# Patient Record
Sex: FEMALE | Race: BLACK OR AFRICAN AMERICAN | NOT HISPANIC OR LATINO | Employment: FULL TIME | URBAN - METROPOLITAN AREA
[De-identification: names, ages, dates, MRNs, and addresses within clinical notes are randomized per-mention and may not be internally consistent; named-entity substitution may affect disease eponyms.]

---

## 2018-01-19 ENCOUNTER — OFFICE VISIT (OUTPATIENT)
Dept: CARDIOLOGY | Facility: CLINIC | Age: 53
End: 2018-01-19
Payer: COMMERCIAL

## 2018-01-19 VITALS
SYSTOLIC BLOOD PRESSURE: 154 MMHG | HEIGHT: 66 IN | BODY MASS INDEX: 23.49 KG/M2 | WEIGHT: 146.19 LBS | DIASTOLIC BLOOD PRESSURE: 80 MMHG

## 2018-01-19 DIAGNOSIS — R00.1 SINUS BRADYCARDIA: ICD-10-CM

## 2018-01-19 DIAGNOSIS — I10 ESSENTIAL HYPERTENSION: Primary | ICD-10-CM

## 2018-01-19 DIAGNOSIS — E03.8 OTHER SPECIFIED HYPOTHYROIDISM: ICD-10-CM

## 2018-01-19 PROCEDURE — 99204 OFFICE O/P NEW MOD 45 MIN: CPT | Mod: S$GLB,,, | Performed by: INTERNAL MEDICINE

## 2018-01-19 PROCEDURE — 93000 ELECTROCARDIOGRAM COMPLETE: CPT | Mod: S$GLB,,, | Performed by: INTERNAL MEDICINE

## 2018-01-19 PROCEDURE — 99999 PR PBB SHADOW E&M-NEW PATIENT-LVL II: CPT | Mod: PBBFAC,,, | Performed by: INTERNAL MEDICINE

## 2018-01-19 RX ORDER — LEVOTHYROXINE SODIUM 100 UG/1
112 TABLET ORAL DAILY
COMMUNITY
End: 2018-07-06

## 2018-01-19 RX ORDER — LIOTHYRONINE SODIUM 5 UG/1
5 TABLET ORAL DAILY
COMMUNITY
End: 2022-01-28

## 2018-01-19 RX ORDER — AMLODIPINE BESYLATE 5 MG/1
5 TABLET ORAL DAILY
Qty: 90 TABLET | Refills: 3 | Status: SHIPPED | OUTPATIENT
Start: 2018-01-19 | End: 2018-01-29 | Stop reason: SDUPTHER

## 2018-01-19 NOTE — PROGRESS NOTES
Subjective:   Patient ID:  Kayleigh Frazier is a 52 y.o. female who presents for cardiac consult of Hypertension and Establish Care      HPI  The patient came in today for cardiac consult of Hypertension and Establish Care    PCP - Dr. Ari Sims      This is a 52 year old female newly diagnosed HTN, thyroidectomy due to thyroid nodules, hypothyroidism presents for initial CV evaluation of HTN.     Pt has been having headaches last week. BP was elevated at [facility] 184/88, 169/93, 148/75. PT went to hospital, slipped on ice head her head on concrete, no syncope, likely concussion but was wearing a hat/cap - went to [facility].. She was given Tylenol, BP was 176/75 initialy then 173/70s, then 152/85. Did not have CT head due to normal neuro exam. 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     Patient feels well, no specific complaints, no chest pain, no sob, no leg swelling, no PND, no palpitation, no dizziness, no syncope, no CNS symptoms.    Patient has fairly good exercise tolerance.    Patient is compliant with medications.    1/19/18 ECG - Sinus bradycadia, V rate 48    FH - Grandmother - Stroke in 60s, mult strokes in family    Past Medical History:   Diagnosis Date    Essential hypertension 1/19/2018    Heart murmur        History reviewed. No pertinent surgical history.    Social History   Substance Use Topics    Smoking status: Never Smoker    Smokeless tobacco: Not on file    Alcohol use Yes      Comment: occassionally       Family History   Problem Relation Age of Onset    Hypertension Mother        Patient's Medications   New Prescriptions    AMLODIPINE (NORVASC) 5 MG TABLET    Take 1 tablet (5 mg total) by mouth once daily.   Previous Medications    ERGOCALCIFEROL, VITAMIN D2, (VITAMIN D ORAL)    Take 10,000 Units by mouth.    LEVOTHYROXINE (SYNTHROID) 100 MCG TABLET    Take 100 mcg by mouth once daily.    LIOTHYRONINE (CYTOMEL) 5 MCG TAB    Take 25 mcg by mouth once daily.   Modified  "Medications    No medications on file   Discontinued Medications    No medications on file       Review of Systems   Constitutional: Negative.    HENT: Negative.    Eyes: Negative.    Respiratory: Negative.    Cardiovascular: Negative.    Gastrointestinal: Negative.    Genitourinary: Negative.    Musculoskeletal: Negative.    Skin: Negative.    Neurological: Negative.    Endo/Heme/Allergies: Negative.    Psychiatric/Behavioral: Negative.    All 12 systems otherwise negative.      Wt Readings from Last 3 Encounters:   01/19/18 66.3 kg (146 lb 2.6 oz)     Temp Readings from Last 3 Encounters:   No data found for Temp     BP Readings from Last 3 Encounters:   01/19/18 (!) 154/80     Pulse Readings from Last 3 Encounters:   No data found for Pulse       BP (!) 154/80   Ht 5' 6" (1.676 m)   Wt 66.3 kg (146 lb 2.6 oz)   BMI 23.59 kg/m²  /80     Objective:   Physical Exam   Constitutional: She is oriented to person, place, and time. She appears well-developed and well-nourished. No distress.   HENT:   Head: Normocephalic and atraumatic.   Nose: Nose normal.   Mouth/Throat: Oropharynx is clear and moist.   Eyes: Conjunctivae and EOM are normal. No scleral icterus.   Neck: Normal range of motion. Neck supple. No JVD present. No thyromegaly present.   Cardiovascular: Normal rate, regular rhythm, S1 normal and S2 normal.  Exam reveals no gallop, no S3, no S4 and no friction rub.    No murmur heard.  Pulmonary/Chest: Effort normal and breath sounds normal. No stridor. No respiratory distress. She has no wheezes. She has no rales. She exhibits no tenderness.   Abdominal: Soft. Bowel sounds are normal. She exhibits no distension and no mass. There is no tenderness. There is no rebound.   Genitourinary:   Genitourinary Comments: Deferred   Musculoskeletal: Normal range of motion. She exhibits no edema, tenderness or deformity.   Lymphadenopathy:     She has no cervical adenopathy.   Neurological: She is alert and oriented " to person, place, and time. She exhibits normal muscle tone. Coordination normal.   Skin: Skin is warm and dry. No rash noted. She is not diaphoretic. No erythema. No pallor.   Psychiatric: She has a normal mood and affect. Her behavior is normal. Judgment and thought content normal.   Nursing note and vitals reviewed.      No results found for: NA, K, CL, CO2, BUN, CREATININE, GLU, HGBA1C, MG, AST, ALT, ALBUMIN, PROT, BILITOT, WBC, HGB, HCT, MCV, PLT, INR, TSH, CHOL, HDL, LDLCALC, TRIG, BNP  Assessment:      1. Essential hypertension    2. Other specified hypothyroidism    3. Sinus bradycardia        Plan:   1. HTN  - will start Norvasc 5 mg, titrate as needed  - discussed low salt diet  - cont diet/exercise  - check 2D echo to evaluate LV function/hypertrophy/valve disease    2. Bradycardia, sinus  - cont to monitor  - asymptomatic     3. Hypothyroidism  - cont meds per PCP  - follow up T4 and T3    Thank you for allowing me to participate in this patient's care. Please do not hesitate to contact me with any questions or concerns. Consult note has been forwarded to the referral physician.

## 2018-01-24 ENCOUNTER — CLINICAL SUPPORT (OUTPATIENT)
Dept: CARDIOLOGY | Facility: CLINIC | Age: 53
End: 2018-01-24
Attending: INTERNAL MEDICINE
Payer: COMMERCIAL

## 2018-01-24 DIAGNOSIS — I10 ESSENTIAL HYPERTENSION: ICD-10-CM

## 2018-01-24 LAB
DIASTOLIC DYSFUNCTION: NO
ESTIMATED PA SYSTOLIC PRESSURE: 19.48
RETIRED EF AND QEF - SEE NOTES: 60 (ref 55–65)

## 2018-01-24 PROCEDURE — 93306 TTE W/DOPPLER COMPLETE: CPT | Mod: S$GLB,,, | Performed by: INTERNAL MEDICINE

## 2018-01-25 ENCOUNTER — PATIENT MESSAGE (OUTPATIENT)
Dept: CARDIOLOGY | Facility: CLINIC | Age: 53
End: 2018-01-25

## 2018-01-27 ENCOUNTER — PATIENT MESSAGE (OUTPATIENT)
Dept: CARDIOLOGY | Facility: CLINIC | Age: 53
End: 2018-01-27

## 2018-01-29 DIAGNOSIS — I10 ESSENTIAL HYPERTENSION: ICD-10-CM

## 2018-01-29 RX ORDER — AMLODIPINE BESYLATE 2.5 MG/1
2.5 TABLET ORAL DAILY
Qty: 90 TABLET | Refills: 3 | Status: SHIPPED | OUTPATIENT
Start: 2018-01-29 | End: 2020-05-06

## 2018-06-22 DIAGNOSIS — M25.552 PAIN OF BOTH HIP JOINTS: Primary | ICD-10-CM

## 2018-06-22 DIAGNOSIS — M25.551 PAIN OF BOTH HIP JOINTS: Primary | ICD-10-CM

## 2018-06-26 ENCOUNTER — HOSPITAL ENCOUNTER (OUTPATIENT)
Dept: RADIOLOGY | Facility: HOSPITAL | Age: 53
Discharge: HOME | End: 2018-06-26
Attending: PHYSICIAN ASSISTANT
Payer: COMMERCIAL

## 2018-06-26 ENCOUNTER — OFFICE VISIT (OUTPATIENT)
Dept: ORTHOPEDICS | Facility: CLINIC | Age: 53
End: 2018-06-26
Payer: COMMERCIAL

## 2018-06-26 VITALS
WEIGHT: 140 LBS | SYSTOLIC BLOOD PRESSURE: 128 MMHG | BODY MASS INDEX: 22.5 KG/M2 | HEART RATE: 48 BPM | DIASTOLIC BLOOD PRESSURE: 77 MMHG | HEIGHT: 66 IN

## 2018-06-26 DIAGNOSIS — M25.552 PAIN OF BOTH HIP JOINTS: ICD-10-CM

## 2018-06-26 DIAGNOSIS — M25.551 PAIN OF BOTH HIP JOINTS: ICD-10-CM

## 2018-06-26 DIAGNOSIS — M54.30 SCIATIC LEG PAIN: Primary | ICD-10-CM

## 2018-06-26 PROCEDURE — 3008F BODY MASS INDEX DOCD: CPT | Mod: CPTII,S$GLB,, | Performed by: PHYSICIAN ASSISTANT

## 2018-06-26 PROCEDURE — 3074F SYST BP LT 130 MM HG: CPT | Mod: CPTII,S$GLB,, | Performed by: PHYSICIAN ASSISTANT

## 2018-06-26 PROCEDURE — 73521 X-RAY EXAM HIPS BI 2 VIEWS: CPT | Mod: TC,FY,PO

## 2018-06-26 PROCEDURE — 99204 OFFICE O/P NEW MOD 45 MIN: CPT | Mod: S$GLB,,, | Performed by: PHYSICIAN ASSISTANT

## 2018-06-26 PROCEDURE — 99999 PR PBB SHADOW E&M-EST. PATIENT-LVL III: CPT | Mod: PBBFAC,,, | Performed by: PHYSICIAN ASSISTANT

## 2018-06-26 PROCEDURE — 73521 X-RAY EXAM HIPS BI 2 VIEWS: CPT | Mod: 26,,, | Performed by: RADIOLOGY

## 2018-06-26 PROCEDURE — 3078F DIAST BP <80 MM HG: CPT | Mod: CPTII,S$GLB,, | Performed by: PHYSICIAN ASSISTANT

## 2018-06-26 RX ORDER — DIAZEPAM 2 MG/1
2 TABLET ORAL
Qty: 2 TABLET | Refills: 0 | Status: SHIPPED | OUTPATIENT
Start: 2018-06-26 | End: 2018-07-06 | Stop reason: ALTCHOICE

## 2018-06-26 RX ORDER — PREDNISONE 20 MG/1
20 TABLET ORAL 2 TIMES DAILY
Qty: 10 TABLET | Refills: 0 | Status: SHIPPED | OUTPATIENT
Start: 2018-06-26 | End: 2018-07-01

## 2018-06-26 NOTE — PATIENT INSTRUCTIONS

## 2018-06-26 NOTE — PROGRESS NOTES
"CC:  53-year-old female  Date of injury:  January 2018 slipped on the ice    HPI:  Under the above the patient suffered a slip and a ice.  Landing on her hip. The pain is waxes and wanes is at time. She has a history of bursitis and also lumbar radiculopathy.  She has attended a exercise class with good results but now the pain has returned.  Today pain level is a 7 on a 10 scale.    PMH:    Past Medical History:   Diagnosis Date    Essential hypertension 1/19/2018    Heart murmur        PSH:  History reviewed. No pertinent surgical history.    Family Hx:    Family History   Problem Relation Age of Onset    Hypertension Mother        Allergy:  Review of patient's allergies indicates:  No Known Allergies    Medication:    Current Outpatient Prescriptions:     amLODIPine (NORVASC) 2.5 MG tablet, Take 1 tablet (2.5 mg total) by mouth once daily., Disp: 90 tablet, Rfl: 3    ERGOCALCIFEROL, VITAMIN D2, (VITAMIN D ORAL), Take 10,000 Units by mouth every 7 days. , Disp: , Rfl:     levothyroxine (SYNTHROID) 100 MCG tablet, Take 112 mcg by mouth once daily. , Disp: , Rfl:     liothyronine (CYTOMEL) 5 MCG Tab, Take 5 mcg by mouth once daily. , Disp: , Rfl:     Social History:    Social History     Social History    Marital status:      Spouse name: N/A    Number of children: N/A    Years of education: N/A     Occupational History    Not on file.     Social History Main Topics    Smoking status: Never Smoker    Smokeless tobacco: Never Used    Alcohol use Yes      Comment: occassionally    Drug use: No    Sexual activity: Not on file     Other Topics Concern    Not on file     Social History Narrative    No narrative on file       Vitals:   /77 (BP Location: Left arm, Patient Position: Sitting, BP Method: Medium (Automatic))   Pulse (!) 48   Ht 5' 6" (1.676 m)   Wt 63.5 kg (140 lb)   BMI 22.60 kg/m²      ROS:  GENERAL: No fever, chills, fatigability or weight loss.  SKIN: No rashes, itching " or changes in color or texture of skin.  HEAD: No headaches or recent head trauma.  EYES: Visual acuity fine. No photophobia, ocular pain or diplopia.  EARS: Denies ear pain, discharge or vertigo.  NOSE: No loss of smell, no epistaxis or postnasal drip.  MOUTH & THROAT: No hoarseness or change in voice. No excessive gum bleeding.  NODES: Denies swollen glands.  CHEST: Denies MCC[place]AND, cyanosis, wheezing, cough and sputum production.  CARDIOVASCULAR: Denies chest pain, PND, orthopnea or reduced exercise tolerance.  ABDOMEN: Appetite fine. No weight loss. Denies diarrhea, abdominal pain, hematemesis or blood in stool.  URINARY: No flank pain, dysuria or hematuria.  PERIPHERAL VASCULAR: No claudication or cyanosis.  NEUROLOGIC: No history of seizures, paralysis, alteration of gait or coordination.  MUSCULOSKELETAL: See HPI    PE:  APPEARANCE: Well nourished, well developed, in no acute distress.   HEAD: Normocephalic, atraumatic.  NEUROLOGIC: Cranial Nerves: II-XII grossly intact, also see MUSCULOSKELETAL  MUSCULOSKELETAL:  Point tenderness of both SI joints.  Point tenderness on the the trochanter. Able to heel and toe without difficulty.  Neurovascular intact.    Assessment:           Diagnosis:              1.  Sciatica               2.  Hip contusion    Diagnostic Studies  MRI-No  X-Ray-Yes agree with the findings of  The bony pelvis is intact. Both femoral heads are well seated within acetabula.  No significant joint space narrowing identified.  No plain film evidence to suggest AVN of either hip.  Multiple phleboliths noted within the pelvis.  There also some phleboliths seen overlying the left psoas margin  EMG/NCV-No    Plan:                                                 1. PT-no   continue Pilates                                              2.OT-no                                          3.NSAID-prednisone taper for 5 days                                  4. Narcotics-no                                     5.  Wound care-N/A                                 6. Rest-no                                           7. Surgery-no                                         8. KATE Hose-no                                    9. Anticoagulation therapy-no               10. Elevation-no                                     11. Crutches-no                                    12. Walker-no             13. Cane no                        14. Referral-no                                     15.Injection-no                            16. Splint   /    Cast   /   Cast Shoe-No              17. RICE            18. Follow up-  as needed

## 2018-06-26 NOTE — LETTER
June 27, 2018      Ari Sims MD  [address]  [address]  [place] [address]           [facility]  [address] [place] [address]  Phone: [phone]  Fax: [phone]          Patient: Kayleigh Frazier   MR Number: [id number]   YOB: 1965   Date of Visit: 6/26/2018       Dear Dr. Ari Sims:    Thank you for referring Kayleigh Frazier to me for evaluation. Attached you will find relevant portions of my assessment and plan of care.    If you have questions, please do not hesitate to call me. I look forward to following Kayleigh Frazier along with you.    Sincerely,    Tommie Rodriguez PA-C    Enclosure  CC:  No Recipients    If you would like to receive this communication electronically, please contact [email or web address] or [phone] to request more information on "[employer] Link access.    For providers and/or their staff who would like to refer a patient to [facility], please contact us through our one-stop-shop provider referral line, [facility] , at [phone].    If you feel you have received this communication in error or would no longer like to receive these types of communications, please e-mail [email or web address]

## 2018-06-28 ENCOUNTER — TELEPHONE (OUTPATIENT)
Dept: RADIOLOGY | Facility: HOSPITAL | Age: 53
End: 2018-06-28

## 2018-06-30 ENCOUNTER — HOSPITAL ENCOUNTER (OUTPATIENT)
Dept: RADIOLOGY | Facility: HOSPITAL | Age: 53
Discharge: HOME | End: 2018-06-30
Attending: PHYSICIAN ASSISTANT
Payer: COMMERCIAL

## 2018-06-30 DIAGNOSIS — M54.30 SCIATIC LEG PAIN: ICD-10-CM

## 2018-06-30 PROCEDURE — 72148 MRI LUMBAR SPINE W/O DYE: CPT | Mod: 26,,, | Performed by: RADIOLOGY

## 2018-06-30 PROCEDURE — 72148 MRI LUMBAR SPINE W/O DYE: CPT | Mod: TC,PO

## 2018-07-02 ENCOUNTER — PATIENT MESSAGE (OUTPATIENT)
Dept: ORTHOPEDICS | Facility: CLINIC | Age: 53
End: 2018-07-02

## 2018-07-05 ENCOUNTER — TELEPHONE (OUTPATIENT)
Dept: ORTHOPEDICS | Facility: CLINIC | Age: 53
End: 2018-07-05

## 2018-07-05 NOTE — TELEPHONE ENCOUNTER
Discuss MRI with patient. Back currently better with exercise. She request copy of MRI be sent to Dr. Mccarty, Urology at [facility].

## 2018-07-06 ENCOUNTER — OFFICE VISIT (OUTPATIENT)
Dept: CARDIOLOGY | Facility: CLINIC | Age: 53
End: 2018-07-06
Payer: COMMERCIAL

## 2018-07-06 VITALS
BODY MASS INDEX: 22.71 KG/M2 | WEIGHT: 141.31 LBS | DIASTOLIC BLOOD PRESSURE: 76 MMHG | SYSTOLIC BLOOD PRESSURE: 126 MMHG | HEART RATE: 56 BPM | HEIGHT: 66 IN

## 2018-07-06 DIAGNOSIS — I10 ESSENTIAL HYPERTENSION: Primary | ICD-10-CM

## 2018-07-06 DIAGNOSIS — R00.1 SINUS BRADYCARDIA: ICD-10-CM

## 2018-07-06 DIAGNOSIS — E03.8 OTHER SPECIFIED HYPOTHYROIDISM: ICD-10-CM

## 2018-07-06 PROCEDURE — 99214 OFFICE O/P EST MOD 30 MIN: CPT | Mod: S$GLB,,, | Performed by: INTERNAL MEDICINE

## 2018-07-06 PROCEDURE — 3078F DIAST BP <80 MM HG: CPT | Mod: CPTII,S$GLB,, | Performed by: INTERNAL MEDICINE

## 2018-07-06 PROCEDURE — 99999 PR PBB SHADOW E&M-EST. PATIENT-LVL III: CPT | Mod: PBBFAC,,, | Performed by: INTERNAL MEDICINE

## 2018-07-06 PROCEDURE — 3074F SYST BP LT 130 MM HG: CPT | Mod: CPTII,S$GLB,, | Performed by: INTERNAL MEDICINE

## 2018-07-06 PROCEDURE — 3008F BODY MASS INDEX DOCD: CPT | Mod: CPTII,S$GLB,, | Performed by: INTERNAL MEDICINE

## 2018-07-06 RX ORDER — LEVOTHYROXINE SODIUM 125 UG/1
100 CAPSULE ORAL DAILY
COMMUNITY
End: 2021-01-27 | Stop reason: SDUPTHER

## 2018-07-06 NOTE — PROGRESS NOTES
Subjective:   Patient ID:  Kayleigh Frazier is a 53 y.o. female who presents for cardiac consult of Hypertension (f/u)      HPI  The patient came in today for cardiac consult of Hypertension (f/u)    PCP - Dr. Ari Sims    This is a 53 year old female newly diagnosed HTN, thyroidectomy due to thyroid nodules, hypothyroidism presents for initial CV evaluation of HTN.     1/19/18  Pt has been having headaches last week. BP was elevated at [facility] 184/88, 169/93, 148/75. PT went to hospital, slipped on ice head her head on concrete, no syncope, likely concussion but was wearing a hat/cap - went to [facility].. She was given Tylenol, BP was 176/75 initialy then 173/70s, then 152/85. Did not have CT head due to normal neuro exam. 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     7/6/18  Pt had 2D ECHO in Jan 2018 which revealed normal BiV function and normal PA pressure 19 mmHg. BP is well controlled. Pt had MRI in Jan, pt has significant allergies and is due to see allergy specialist. PT does have occ headaches which improve with allergy meds.     Patient feels no chest pain, no sob, no leg swelling, no PND, no palpitation, no dizziness, no syncope, no CNS symptoms.    Patient has fairly good exercise tolerance.    Patient is compliant with medications.    1/19/18 ECG - Sinus bradycadia, V rate 48    FH - Grandmother - Stroke in 60s, mult strokes in family    2D ECHO  CONCLUSIONS     1 - No wall motion abnormalities.     2 - Normal left ventricular systolic function (EF 60-65%).     3 - Normal left ventricular diastolic function.     4 - Normal right ventricular systolic function .     5 - The estimated PA systolic pressure is 19 mmHg.     This document has been electronically    SIGNED BY: Beatris Salinas MD On: 01/24/2018 17:35    Past Medical History:   Diagnosis Date    Essential hypertension 1/19/2018    Heart murmur        History reviewed. No pertinent surgical history.    Social History   Substance Use  "Topics    Smoking status: Never Smoker    Smokeless tobacco: Never Used    Alcohol use Yes      Comment: occassionally       Family History   Problem Relation Age of Onset    Hypertension Mother        Patient's Medications   New Prescriptions    No medications on file   Previous Medications    AMLODIPINE (NORVASC) 2.5 MG TABLET    Take 1 tablet (2.5 mg total) by mouth once daily.    ERGOCALCIFEROL, VITAMIN D2, (VITAMIN D ORAL)    Take 10,000 Units by mouth every 7 days.     LEVOTHYROXINE (SYNTHROID) 112 MCG TABLET    Take 112 mcg by mouth once daily.    LIOTHYRONINE (CYTOMEL) 5 MCG TAB    Take 5 mcg by mouth once daily.    Modified Medications    No medications on file   Discontinued Medications    DIAZEPAM (VALIUM) 2 MG TABLET    Take 1 tablet (2 mg total) by mouth as needed for Anxiety.    LEVOTHYROXINE (SYNTHROID) 100 MCG TABLET    Take 112 mcg by mouth once daily.        Review of Systems   Constitutional: Negative.    HENT: Negative.    Eyes: Negative.    Respiratory: Negative.    Cardiovascular: Negative.    Gastrointestinal: Negative.    Genitourinary: Negative.    Musculoskeletal: Negative.    Skin: Negative.    Neurological: Negative.    Endo/Heme/Allergies: Negative.    Psychiatric/Behavioral: Negative.    All 12 systems otherwise negative.      Wt Readings from Last 3 Encounters:   07/06/18 64.1 kg (141 lb 5 oz)   06/26/18 63.5 kg (140 lb)   01/19/18 66.3 kg (146 lb 2.6 oz)     Temp Readings from Last 3 Encounters:   No data found for Temp     BP Readings from Last 3 Encounters:   07/06/18 126/76   06/26/18 128/77   01/19/18 (!) 154/80     Pulse Readings from Last 3 Encounters:   07/06/18 (!) 56   06/26/18 (!) 48       /76 (BP Method: Small (Manual))   Pulse (!) 56   Ht 5' 6" (1.676 m)   Wt 64.1 kg (141 lb 5 oz)   BMI 22.81 kg/m²  /80     Objective:   Physical Exam   Constitutional: She is oriented to person, place, and time. She appears well-developed and well-nourished. No distress. "   HENT:   Head: Normocephalic and atraumatic.   Nose: Nose normal.   Mouth/Throat: Oropharynx is clear and moist.   Eyes: Conjunctivae and EOM are normal. No scleral icterus.   Neck: Normal range of motion. Neck supple. No JVD present. No thyromegaly present.   Cardiovascular: Normal rate, regular rhythm, S1 normal and S2 normal.  Exam reveals no gallop, no S3, no S4 and no friction rub.    No murmur heard.  Pulmonary/Chest: Effort normal and breath sounds normal. No stridor. No respiratory distress. She has no wheezes. She has no rales. She exhibits no tenderness.   Abdominal: Soft. Bowel sounds are normal. She exhibits no distension and no mass. There is no tenderness. There is no rebound.   Genitourinary:   Genitourinary Comments: Deferred   Musculoskeletal: Normal range of motion. She exhibits no edema, tenderness or deformity.   Lymphadenopathy:     She has no cervical adenopathy.   Neurological: She is alert and oriented to person, place, and time. She exhibits normal muscle tone. Coordination normal.   Skin: Skin is warm and dry. No rash noted. She is not diaphoretic. No erythema. No pallor.   Psychiatric: She has a normal mood and affect. Her behavior is normal. Judgment and thought content normal.   Nursing note and vitals reviewed.      No results found for: NA, K, CL, CO2, BUN, CREATININE, GLU, HGBA1C, MG, AST, ALT, ALBUMIN, PROT, BILITOT, WBC, HGB, HCT, MCV, PLT, INR, TSH, CHOL, HDL, LDLCALC, TRIG, BNP  Assessment:      1. Essential hypertension    2. Sinus bradycardia    3. Other specified hypothyroidism        Plan:   1. HTN  - well controlled, cont norvasc 2.5  Mg  - if BP remains controlled off meds can stop  - discussed low salt diet  - cont diet/exercise    2. Bradycardia, sinus  - cont to monitor  - asymptomatic     3. Hypothyroidism  - cont meds per PCP    Thank you for allowing me to participate in this patient's care. Please do not hesitate to contact me with any questions or concerns. Consult  note has been forwarded to the referral physician.

## 2018-10-15 ENCOUNTER — TELEPHONE (OUTPATIENT)
Dept: ORTHOPEDICS | Facility: CLINIC | Age: 53
End: 2018-10-15

## 2018-10-15 NOTE — TELEPHONE ENCOUNTER
Left a message for the patient to give the office a call back.FP      ----- Message from Lilly Morgan sent at 10/15/2018  8:24 AM CDT -----  Contact: self [phone]  States that she would like to speak to nurse regarding referral to physical medicine. Please call back at [phone]//thank you acc

## 2019-03-14 DIAGNOSIS — I10 ESSENTIAL HYPERTENSION: Primary | ICD-10-CM

## 2019-04-25 ENCOUNTER — TELEPHONE (OUTPATIENT)
Dept: PHYSICAL MEDICINE AND REHAB | Facility: CLINIC | Age: 54
End: 2019-04-25

## 2019-04-25 NOTE — TELEPHONE ENCOUNTER
----- Message from Alfredito Guerra sent at 4/25/2019 11:42 AM CDT -----  Contact: Pt  Type:  Same Day Appointment Request    Caller is requesting a same day appointment.  Caller declined first available appointment listed below.    Name of Caller: Pt  When is the first available appointment?04/29/19 @ 12:40p.m.   Symptoms: Eval for lower back pain MRI review  Best Call Back Number: [phone] (home)  Additional Information: n/a

## 2019-04-29 ENCOUNTER — OFFICE VISIT (OUTPATIENT)
Dept: PHYSICAL MEDICINE AND REHAB | Facility: CLINIC | Age: 54
End: 2019-04-29
Payer: COMMERCIAL

## 2019-04-29 VITALS
HEART RATE: 60 BPM | HEIGHT: 66 IN | BODY MASS INDEX: 22.66 KG/M2 | WEIGHT: 141 LBS | SYSTOLIC BLOOD PRESSURE: 163 MMHG | DIASTOLIC BLOOD PRESSURE: 83 MMHG | RESPIRATION RATE: 14 BRPM

## 2019-04-29 DIAGNOSIS — M47.816 LUMBAR FACET ARTHROPATHY: ICD-10-CM

## 2019-04-29 DIAGNOSIS — M70.61 GREATER TROCHANTERIC BURSITIS OF BOTH HIPS: Primary | ICD-10-CM

## 2019-04-29 DIAGNOSIS — M70.62 GREATER TROCHANTERIC BURSITIS OF BOTH HIPS: Primary | ICD-10-CM

## 2019-04-29 DIAGNOSIS — M76.32 IT BAND SYNDROME, LEFT: ICD-10-CM

## 2019-04-29 PROCEDURE — 99999 PR PBB SHADOW E&M-EST. PATIENT-LVL III: CPT | Mod: PBBFAC,,, | Performed by: PHYSICAL MEDICINE & REHABILITATION

## 2019-04-29 PROCEDURE — 3008F BODY MASS INDEX DOCD: CPT | Mod: CPTII,S$GLB,, | Performed by: PHYSICAL MEDICINE & REHABILITATION

## 2019-04-29 PROCEDURE — 99204 PR OFFICE/OUTPT VISIT, NEW, LEVL IV, 45-59 MIN: ICD-10-PCS | Mod: S$GLB,,, | Performed by: PHYSICAL MEDICINE & REHABILITATION

## 2019-04-29 PROCEDURE — 3008F PR BODY MASS INDEX (BMI) DOCUMENTED: ICD-10-PCS | Mod: CPTII,S$GLB,, | Performed by: PHYSICAL MEDICINE & REHABILITATION

## 2019-04-29 PROCEDURE — 3077F PR MOST RECENT SYSTOLIC BLOOD PRESSURE >= 140 MM HG: ICD-10-PCS | Mod: CPTII,S$GLB,, | Performed by: PHYSICAL MEDICINE & REHABILITATION

## 2019-04-29 PROCEDURE — 3079F PR MOST RECENT DIASTOLIC BLOOD PRESSURE 80-89 MM HG: ICD-10-PCS | Mod: CPTII,S$GLB,, | Performed by: PHYSICAL MEDICINE & REHABILITATION

## 2019-04-29 PROCEDURE — 99204 OFFICE O/P NEW MOD 45 MIN: CPT | Mod: S$GLB,,, | Performed by: PHYSICAL MEDICINE & REHABILITATION

## 2019-04-29 PROCEDURE — 99999 PR PBB SHADOW E&M-EST. PATIENT-LVL III: ICD-10-PCS | Mod: PBBFAC,,, | Performed by: PHYSICAL MEDICINE & REHABILITATION

## 2019-04-29 PROCEDURE — 3077F SYST BP >= 140 MM HG: CPT | Mod: CPTII,S$GLB,, | Performed by: PHYSICAL MEDICINE & REHABILITATION

## 2019-04-29 PROCEDURE — 3079F DIAST BP 80-89 MM HG: CPT | Mod: CPTII,S$GLB,, | Performed by: PHYSICAL MEDICINE & REHABILITATION

## 2019-04-29 RX ORDER — KETOROLAC TROMETHAMINE 10 MG/1
10 TABLET, FILM COATED ORAL 2 TIMES DAILY
Qty: 8 TABLET | Refills: 0 | Status: SHIPPED | OUTPATIENT
Start: 2019-04-29 | End: 2019-05-03

## 2019-04-29 NOTE — PROGRESS NOTES
PM&R NEW PATIENT HISTORY & PHYSICAL :    Referring Physician:     Chief Complaint   Patient presents with    Hip Pain     left to the upper leg       HPI: This is a 53 y.o.  female being seen in clinic today for evaluation of chronic left hip achy pain that is worse when lying on her left side or with prolonged standing.  She does pilates for exercise, but is still having discomfort and limited ROM.  She has been using heat for short term relief.     History obtained from patient    Functional History:  Walking: Not limited  Transfers: Independent  Assistive devices: No  Power mobility: No  Falls: None     Needs help with:  Nothing - all ADLS normal    Cooking   Cleaning  Bathing   Dressing   Toileting     Past family, medical, social, and surgical history reviewed in chart    Review of Systems:     General- denies lethargy, weight change, fever, chills  Head/neck- denies swallowing difficulties  ENT- denies hearing changes  Cardiovascular-denies chest pain  Pulmonary- denies shortness of breath  GI- denies constipation or bowel incontinence  - denies bladder incontinence  Skin- denies wounds or rashes  Musculoskeletal- denies weakness, +pain  Neurologic- denies numbness and tingling  Psychiatric- denies depressive or psychotic features, denies anxiety  Lymphatic-denies swelling  Endocrine- denies hypoglycemic symptoms/DM history  All other pertinent systems negative     Physical Examination:  General: Well developed, well nourished female, NAD  HEENT:NCAT EOMI bilaterally   Pulmonary:Normal respirations    Spinal Examination: CERVICAL  Active ROM is within normal limits.  Inspection: No deformity of spinal alignment.    Spinal Examination: LUMBAR or THORACIC  Active ROM is within normal limits.  Inspection: No deformity of spinal alignment.  No palpable olisthesis.  Palpation: No vertebral tenderness to percussion.  Mild ttp at si joints, very ttp at left GT bursa, ttp at right GT bursa, mild ttp and tight along  ITB on left  +facet loading bilaterally  SLR Test (seated ):negative  bilaterally  Able to stand on heels and toes    Bilateral Upper and Lower Extremities:  Pulses are 2+ at radial,  bilaterally.  Shoulder/Elbow/Wrist/Hand ROM   Hip/Knee/Ankle ROM wnl  Bilateral Extremities show normal capillary refill.  No signs of cyanosis, rubor, edema, skin changes, or dysvascular changes of appendages.  Nails appear intact.    Neurological Exam:  Cranial Nerves:  II-XII grossly intact    Manual Muscle Testing: (Motor 5=normal)  RIGHT Lower extremity: Hip flexion 5/5, Hip Abduction 4/5, Hip Adduction 4+/5, Knee extension 5/5, Knee flexion 5/5, Ankle dorsiflexion 5/5, Extensor hallucis longus 5/5, Ankle plantarflexion 5/5  LEFT Lower extremity:  Hip flexion 5/5, Hip Abduction 4/5,Hip Adduction 4+/5, Knee extension 5/5, Knee flexion 5/5, Ankle dorsiflexion 5/5, Extensor hallucis longus 5/5, Ankle plantarflexion 5/5    No focal atrophy is noted of either lower extremity.    Bilateral Reflexes:hypo at patellar  No clonus at knee or ankle.    Sensation: tested to light touch  - intact in legs    Gait: Narrow base and good arm swing.      IMPRESSION/PLAN: This is a 53 y.o.  female with GT bursitis-left worse than right, IT band tightness on left, probable underlying mild lumbar facet arthropathy    1. Handouts on back and hip exercises, etc provided  2. Ice instead of heat  3. Ketorolac x4 days, tylenol prn, topical agents  4. If not improving, will send to formal PT    Rose Marie Bruno M.D.  Physical Medicine and Rehab

## 2019-04-29 NOTE — PATIENT INSTRUCTIONS
Side Lying Hip Abduction (Strength)    1. Lie down on the floor on your side. Rest your head on your arm. Bend your legs at the knees.  2. Keep your feet together and lift your top leg up so that your knees are . Keep your hips steady.     3. Slowly lower your leg back down.  4. Repeat 10 times, or as instructed.  5. Switch sides if instructed.     Challenge yourself  Put an elastic band or tubing around your thighs. Raise and lower your top leg slowly and steadily.      Date Last Reviewed: 3/29/2016  © 8017-1614 [email or web address]. [address] [place] [address]. All rights reserved. This information is not intended as a substitute for professional medical care. Always follow your healthcare professional's instructions.        Understanding Trochanteric Bursitis    A bursa is a thin, slippery, sac-like film. It contains a small amount of fluid. This structure is found between bones and soft tissues in and around joints. A bursa cushions and protects a joint. It keeps parts of a joint from rubbing against each other. If a bursa becomes inflamed and irritated, it is known as bursitis.  The trochanteric bursa is found on the hip joint. It lies on top of the bump at the top of the thighbone called the greater trochanter. Inflammation of this bursa is called trochanteric bursitis.     How to say it  [id number]   Causes of trochanteric bursitis  Causes may include:  · Overuse of the hip during running or other sports, dance, or work  · Falling on or irritation to the side of the hip  This condition may occur along with other problems, such as osteoarthritis of the hip or knee, or low back problems. In rare cases, it may occur after hip surgery.  Symptoms of trochanteric bursitis  · Pain or aching on the side of the hip. The pain may travel down the leg.  · Swelling, tenderness, or warmth on the side of the hip at the bony bump at the top of the thigh  Treatment for trochanteric  bursitis  These may include:  · Resting the hip. This allows the bursa to heal.  · Prescription or over-the-counter pain medicines. These help reduce inflammation, swelling, and pain.  · Cold packs and heat packs. These help reduce pain and swelling.  · Stretching and strengthening exercises. These improve flexibility and strength around the hip.  · Physical therapy. This includes exercises or other treatments.  · Injections of medicine into the bursa. This may help reduce inflammation and relieve symptoms.  Possible complications  If you dont give your hip time to heal, the problem may not go away, may return, or may get worse. Rest and treat your hip as directed.     When to call your healthcare provider  Call your healthcare provider right away if you have any of these:  · Fever of 100.4°F (38°C) or higher, or as directed  · Redness, swelling, or warmth that gets worse  · Symptoms that dont get better with prescribed medicines, or get worse  · New symptoms   Date Last Reviewed: 3/29/2016  © 9216-2562 [employer] [address] [place] [address]. All rights reserved. This information is not intended as a substitute for professional medical care. Always follow your healthcare professional's instructions.        Back Exercises: Hip Lift    To start, lie on your back with your knees bent and feet flat on the floor. Dont press your neck or lower back to the floor. Breathe deeply. You should feel comfortable and relaxed in this position:  · Tighten your abdomen and buttocks.  · Slowly raise your hips upward. Be careful not to arch your back.  · Hold for 5 seconds. Lower your hips to the floor.  · Repeat 10 times.  For your safety, check with your healthcare provider before starting an exercise program.   Date Last Reviewed: 8/16/2015  © 3506-1218 [employer] [address] [place] [address]. All rights reserved. This information is not intended as a substitute for  professional medical care. Always follow your healthcare professional's instructions.        Back Exercises: Hip Rotator Stretch    To start, lie on your back with your knees bent and feet flat on the floor. Dont press your neck or lower back to the floor. Breathe deeply. You should feel comfortable and relaxed in this position.  · Rest your right ankle on your left knee.  · Place a towel behind your left thigh, and use it to pull the knee toward your chest. Feel the stretch in your buttocks.  · Hold for 30 to 60 seconds. Release.  · Repeat 2 times.  · Switch legs.   · If there is any pain other than stretch in the knee or buttock, stop and contact your healthcare provider.  For your safety, check with your healthcare provider before starting an exercise program.   Date Last Reviewed: 8/16/2015  © 9357-3881 [employer]. [address] [place] [address]. All rights reserved. This information is not intended as a substitute for professional medical care. Always follow your healthcare professional's instructions.        Hip Abduction (Strength)    6. Lie on your right side on the floor with your legs straight.  7. Raise your left leg about 6 to 8 inches. Keep your legs and hips straight. Dont roll back onto your hip. Hold for 5 seconds, then lower your leg.  8. Repeat 10 times, or as instructed.  9. Switch legs and repeat.     Challenge yourself  Put an elastic band or tubing around both ankles. Hold the band to the floor with your bottom ankle. Raise and lower your top leg slowly and steadily.   Date Last Reviewed: 3/10/2016  © 7435-5596 [employer]. [address] [place] [address]. All rights reserved. This information is not intended as a substitute for professional medical care. Always follow your healthcare professional's instructions.        Hip Abduction with External Rotation (Strength)    These instructions are for your right knee. Switch sides for your left   knee.  10. Get down on the floor on your hands and knees.  11. Lift your right leg up and out to the side. Keep the knee bent. Raise the leg as high as is comfortable. Hold for 3 seconds.  12. Slowly lower your leg back to the floor.  13. Repeat 5 times, or as instructed.  Date Last Reviewed: 3/10/2016  © 9123-9226 [employer]. [address] [place] [address]. All rights reserved. This information is not intended as a substitute for professional medical care. Always follow your healthcare professional's instructions.        Hip Adduction (Strength)    These instructions are for your right foot. Switch sides for your left foot.  14. Lie on your right side on the floor. Keep your right leg straight. Bend your left leg and put your left foot flat on the floor behind your right knee.  15. Raise your right leg as high as you comfortably can. Hold for 5 seconds, then lower it back down.  16. Repeat 10 times, or as instructed.  17. Switch legs and repeat.  Date Last Reviewed: 3/10/2016  © 2283-2496 [employer]. [address] [place] [address]. All rights reserved. This information is not intended as a substitute for professional medical care. Always follow your healthcare professional's instructions.        Hip Adductor Stretch (Flexibility)    18. Sit on the floor. Put the soles of your feet together so your knees are pointed outward.  19. Pull your heels in toward your groin, as close as is comfortable.  20. Put your hands on your knees, and gently push them closer to the floor.  21. Hold for 30 to 60 seconds.  22. Relax and repeat 2 times, or as instructed.  23. Repeat this exercise 3 times a day, or as instructed.  Date Last Reviewed: 3/10/2016  © 2566-0514 [employer]. [address] [place] [address]. All rights reserved. This information is not intended as a substitute for professional medical care. Always follow your healthcare professional's  instructions.        Hip Extension (Strength)    24. Lie on your back on the floor with your knees bent and feet flat on the floor. Put your arms at your side, palms flat on the floor.  25. Tighten your core muscles and keep them tight through the whole exercise.  26. Push down on your feet and raise your hips and lift your buttocks off the floor.  27. Dont arch your back.  28. Hold for 5 seconds.  29. Lower your buttocks back down to the floor.  30. Repeat 5 to 10 times, or as instructed.  Date Last Reviewed: 3/10/2016  © 1034-3269 [employer]. [address] [place] [address], [place] [address]. All rights reserved. This information is not intended as a substitute for professional medical care. Always follow your healthcare professional's instructions.        Exercises to Strengthen Your Lower Back  Strong lower back and abdominal muscles work together to support your spine. The exercises below will help strengthen the lower back. It is important that you begin exercising slowly and increase levels gradually.  Always begin any exercise program with stretching. If you feel pain while doing any of these exercises, stop and talk to your doctor about a more specific exercise program that better suits your condition.   Low back stretch  The point of stretching is to make you more flexible and increase your range of motion. Stretch only as much as you are able. Stretch slowly. Do not push your stretch to the limit. If at any point you feel pain while stretching, this is your (temporary) limit.  · Lie on your back with your knees bent and both feet on the ground.  · Slowly raise your left knee to your chest as you flatten your lower back against the floor. Hold for 5 seconds.  · Relax and repeat the exercise with your right knee.  · Do 10 of these exercises for each leg.  · Repeat hugging both knees to your chest at the same time.  Building lower back strength  Start your exercise routine with 10 to 30 minutes a  day, 1 to 3 times a day.  Initial exercises  Lying on your back:  1. Ankle pumps: Move your foot up and down, towards your head, and then away. Repeat 10 times with each foot.  2. Heel slides: Slowly bend your knee, drawing the heel of your foot towards you. Then slide your heel/foot from you, straightening your knee. Do not lift your foot off the floor (this is not a leg lift).  3. Abdominal contraction: Bend your knees and put your hands on your stomach. Tighten your stomach muscles. Hold for 5 seconds, then relax. Repeat 10 times.  4. Straight leg raise: Bend one leg at the knee and keep the other leg straight. Tighten your stomach muscles. Slowly lift your straight leg 6 to 12 inches off the floor and hold for up to 5 seconds. Repeat 10 times on each side.  Standin. Wall squats: Stand with your back against the wall. Move your feet about 12 inches away from the wall. Tighten your stomach muscles, and slowly bend your knees until they are at about a 45 degree angle. Do not go down too far. Hold about 5 seconds. Then slowly return to your starting position. Repeat 10 times.  2. Heel raises: Stand facing the wall. Slowly raise the heels of your feet up and down, while keeping your toes on the floor. If you have trouble balancing, you can touch the wall with your hands. Repeat 10 times.  More advanced exercises  When you feel comfortable enough, try these exercises.  1. Kneeling lumbar extension: Begin on your hands and knees. At the same time, raise and straighten your right arm and left leg until they are parallel to the ground. Hold for 2 seconds and come back slowly to a starting position. Repeat with left arm and right leg, alternating 10 times.  2. Prone lumbar extension: Lie face down, arms extended overhead, palms on the floor. At the same time, raise your right arm and left leg as high as comfortably possible. Hold for 10 seconds and slowly return to start. Repeat with left arm and right leg,  alternating 10 times. Gradually build up to 20 times. (Advanced: Repeat this exercise raising both arms and both legs a few inches off the floor at the same time. Hold for 5 seconds and release.)  3. Pelvic tilt: Lie on the floor on your back with your knees bent at 90 degrees. Your feet should be flat on the floor. Inhale, exhale, then slowly contract your abdominal muscles bringing your navel toward your spine. Let your pelvis rock back until your lower back is flat on the floor. Hold for 10 seconds while breathing smoothly.  4. Abdominal crunch: Perform a pelvic tilt (above) flattening your lower back against the floor. Holding the tension in your abdominal muscles, take another breath and raise your shoulder blades off the ground (this is not a full sit-up). Keep your head in line with your body (dont bend your neck forward). Hold for 2 seconds, then slowly lower.  Date Last Reviewed: 6/1/2016  © 6585-8251 [employer]. [address] [place] [address], [place] [address]. All rights reserved. This information is not intended as a substitute for professional medical care. Always follow your healthcare professional's instructions.        Understanding Iliotibial Band Syndrome  Iliotibial band syndrome, or IT band syndrome, is a condition that causes pain on the outside of the knee. It most often occurs in athletes, especially long-distance runners. It can also happen if you cycle, ski, row, or play soccer. It can also occur in people who are just starting to exercise.  What is the IT band?  The iliotibial (IT) band is a strong, thick band of tissue that runs down the outside of your thigh. It goes all the way from your hipbones to the top of your shinbone (tibia), just below your knee joint. The bones of your knee joint include your tibia, your thighbone (femur), and your kneecap (patella).  When you bend and straighten your leg, the IT band moves over the outer lower edge of your femur. Over time, bending and  straightening your leg can cause the IT band to irritate nearby tissues and cause pain.  What causes IT band syndrome?  Researchers are still learning the exact cause of IT band syndrome. The pain may be caused by the IT band rubbing over the lower outer edge of the femur. This may cause inflammation in the bone, tendons, and small fluid-filled sacs (bursa) in the area. The IT band may also compress the tissue under it and cause pain.  If you are a runner, you might be more likely to develop IT band syndrome if:  · You run on uneven or downhill terrain  · You run on worn-out shoes  · You run many miles per day  · Your legs slope a little inward from your knee to your ankle (bowlegs)  What are the symptoms of IT band syndrome?  IT band syndrome causes pain on the outside of the knee or side of the thigh. It might affect one or both of your knees. The pain is an aching, burning feeling that can spread up the thigh to the hip. You may feel this pain only when you exercise, such as while running. The pain may be worst right after you step on your foot. It may only happen near the end of your exercise. As the condition gets worse, pain may start earlier and continue after you have stopped exercising. Going up and down the stairs may make the pain worse.  How is IT band syndrome diagnosed?  Your doctor will ask about your health history and your symptoms. He or she will give you a physical exam. This will include an exam of your knee. The range of motion and strength of your knee will be tested. The doctor will look for areas of pain around your knee, thigh, and hip. The symptoms of IT band syndrome can be like those of osteoarthritis or a meniscal tear. Your doctor will need to make sure IT band syndrome is the cause of your symptoms. If the diagnosis is not clear, you may need imaging tests. These can include an X-ray or MRI scan.  Date Last Reviewed: 4/1/2017  © 6042-5478 [employer]. [address] [place] [address]  Road, [place] [address]. All rights reserved. This information is not intended as a substitute for professional medical care. Always follow your healthcare professional's instructions.        Treatment for Iliotibial Band Syndrome  Iliotibial band syndrome, or IT band syndrome, is a condition that causes pain on the outside of the knee. It most often occurs in athletes, especially long-distance runners. It can happen if you cycle, ski, row, or play soccer. It can also occur in people who are starting to exercise.  Types of treatment  Treatment may include:  · Avoiding any activity that makes your knee pain worse for a while (like running), and returning to this activity slowly over time  · Icing the outside of your knee when it causes you pain  · Taking over-the-counter pain medicines  · Having corticosteroid injections, to reduce inflammation  · Making changes to your activity, like lowering your bicycle seat for cycling or improving your running form  · Practicing special exercises to stretch and strengthen the muscles around your hip and your knee  You may find it helpful to work with a physical therapist.  These treatments help most people with IT band syndrome. Your doctor may advise surgery if you still have severe symptoms after 6 months of other treatment. Your doctor will talk with you about the types of surgery.  Preventing IT band syndrome  You may be able to prevent IT band syndrome if you:  · Run on even surfaces  · Replace your running shoes often  · Ease up on your training  · On a track, make sure you run in both directions  · Have an expert check your stance for running and other sporting activities  · Stretch your outer thigh and hamstrings often  If you are new to exercise, start slowly. Increase your activity over time.  Talk with your doctor or  for more advice.  When to call the healthcare provider  Call your healthcare provider right away if you have any of these:  · Symptoms that get  worse, or dont get better with treatment  · New symptoms   Date Last Reviewed: 8/10/2015  © 7709-7929 [employer]. [address] [place] [address], [place] [address]. All rights reserved. This information is not intended as a substitute for professional medical care. Always follow your healthcare professional's instructions.

## 2020-05-06 ENCOUNTER — OFFICE VISIT (OUTPATIENT)
Dept: CARDIOLOGY | Facility: CLINIC | Age: 55
End: 2020-05-06
Payer: COMMERCIAL

## 2020-05-06 ENCOUNTER — PATIENT MESSAGE (OUTPATIENT)
Dept: CARDIOLOGY | Facility: CLINIC | Age: 55
End: 2020-05-06

## 2020-05-06 DIAGNOSIS — I10 ESSENTIAL HYPERTENSION: Primary | ICD-10-CM

## 2020-05-06 DIAGNOSIS — E03.8 OTHER SPECIFIED HYPOTHYROIDISM: ICD-10-CM

## 2020-05-06 DIAGNOSIS — R00.2 PALPITATIONS: ICD-10-CM

## 2020-05-06 PROBLEM — R00.1 SINUS BRADYCARDIA: Status: RESOLVED | Noted: 2018-01-19 | Resolved: 2020-05-06

## 2020-05-06 PROCEDURE — 99214 OFFICE O/P EST MOD 30 MIN: CPT | Mod: 95,,, | Performed by: INTERNAL MEDICINE

## 2020-05-06 PROCEDURE — 99214 PR OFFICE/OUTPT VISIT, EST, LEVL IV, 30-39 MIN: ICD-10-PCS | Mod: 95,,, | Performed by: INTERNAL MEDICINE

## 2020-05-06 RX ORDER — CARVEDILOL 3.12 MG/1
3.12 TABLET ORAL 2 TIMES DAILY WITH MEALS
Qty: 60 TABLET | Refills: 2 | Status: SHIPPED | OUTPATIENT
Start: 2020-05-06 | End: 2020-06-22

## 2020-05-06 RX ORDER — CARVEDILOL 6.25 MG/1
6.25 TABLET ORAL 2 TIMES DAILY WITH MEALS
Qty: 60 TABLET | Refills: 2 | Status: SHIPPED | OUTPATIENT
Start: 2020-05-06 | End: 2020-05-06 | Stop reason: SDUPTHER

## 2020-05-06 NOTE — PROGRESS NOTES
Subjective:   Patient ID:  Kayleigh Frazier is a 54 y.o. female who presents for cardiac consult of Follow-up    The patient location is: home  The chief complaint leading to consultation is: CV follow up  Visit type: audiovisual  Total time spent with patient: 21 min  Each patient to whom he or she provides medical services by telemedicine is:  (1) informed of the relationship between the physician and patient and the respective role of any other health care provider with respect to management of the patient; and (2) notified that he or she may decline to receive medical services by telemedicine and may withdraw from such care at any time.    Notes: see below      HPI  The patient came in today for cardiac consult of Follow-up    PCP - Dr. Ari Sims    Kayleigh Frazier is a 54 y.o. female  HTN, thyroidectomy due to thyroid nodules, hypothyroidism presents for follow up CV eval.     1/19/18  Pt has been having headaches last week. BP was elevated at [facility] 184/88, 169/93, 148/75. PT went to hospital, slipped on ice head her head on concrete, no syncope, likely concussion but was wearing a hat/cap - went to [facility].. She was given Tylenol, BP was 176/75 initialy then 173/70s, then 152/85. Did not have CT head due to normal neuro exam. 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     7/6/18  Pt had 2D ECHO in Jan 2018 which revealed normal BiV function and normal PA pressure 19 mmHg. BP is well controlled. Pt had MRI in Jan, pt has significant allergies and is due to see allergy specialist. PT does have occ headaches which improve with allergy meds.     5/6/20  She switched jobs working at [employer]. She helps with special projects. For the past two weeks has been having headcahes, she has been caring for her mom as well. She is unsure if anxiety causing issues. Last night 162/91 - 132/86, she just took a hot bath and came over after talking with her mother.     Patient feels no  chest pain, no sob, no leg swelling, no PND,  no dizziness, no syncope, no CNS symptoms.    Patient has fairly good exercise tolerance.    Patient is compliant with medications.    1/19/18 ECG - Sinus bradycadia, V rate 48    FH - Grandmother - Stroke in 60s, mult strokes in family    2D ECHO  CONCLUSIONS     1 - No wall motion abnormalities.     2 - Normal left ventricular systolic function (EF 60-65%).     3 - Normal left ventricular diastolic function.     4 - Normal right ventricular systolic function .     5 - The estimated PA systolic pressure is 19 mmHg.     This document has been electronically    SIGNED BY: Beatris Salinas MD On: 01/24/2018 17:35    Past Medical History:   Diagnosis Date    Essential hypertension 1/19/2018    Heart murmur        History reviewed. No pertinent surgical history.    Social History     Tobacco Use    Smoking status: Never Smoker    Smokeless tobacco: Never Used   Substance Use Topics    Alcohol use: Yes     Comment: occassionally    Drug use: No       Family History   Problem Relation Age of Onset    Hypertension Mother        Patient's Medications   New Prescriptions    CARVEDILOL (COREG) 3.125 MG TABLET    Take 1 tablet (3.125 mg total) by mouth 2 (two) times daily with meals.   Previous Medications    ERGOCALCIFEROL, VITAMIN D2, (VITAMIN D ORAL)    Take 10,000 Units by mouth every 7 days.     LEVOTHYROXINE (SYNTHROID) 112 MCG TABLET    Take 112 mcg by mouth once daily.    LIOTHYRONINE (CYTOMEL) 5 MCG TAB    Take 5 mcg by mouth once daily.    Modified Medications    No medications on file   Discontinued Medications    AMLODIPINE (NORVASC) 2.5 MG TABLET    Take 1 tablet (2.5 mg total) by mouth once daily.       Review of Systems   Constitutional: Negative.    HENT: Negative.    Eyes: Negative.    Respiratory: Negative.    Cardiovascular: Positive for palpitations.   Gastrointestinal: Negative.    Genitourinary: Negative.    Musculoskeletal: Negative.    Skin: Negative.     Neurological: Negative.    Endo/Heme/Allergies: Negative.    Psychiatric/Behavioral: The patient is nervous/anxious.    All 12 systems otherwise negative.      Wt Readings from Last 3 Encounters:   04/29/19 64 kg (141 lb)   07/06/18 64.1 kg (141 lb 5 oz)   06/26/18 63.5 kg (140 lb)     Temp Readings from Last 3 Encounters:   No data found for Temp     BP Readings from Last 3 Encounters:   04/29/19 (!) 163/83   07/06/18 126/76   06/26/18 128/77     Pulse Readings from Last 3 Encounters:   04/29/19 60   07/06/18 (!) 56   06/26/18 (!) 48       There were no vitals taken for this visit.     Objective:   Physical Exam   Constitutional: She is oriented to person, place, and time. She appears well-developed and well-nourished. No distress.   HENT:   Head: Normocephalic.   Mouth/Throat: No oropharyngeal exudate.   Eyes: Conjunctivae are normal. Right eye exhibits no discharge. Left eye exhibits no discharge. No scleral icterus.   Pulmonary/Chest: Effort normal. No respiratory distress.   Neurological: She is alert and oriented to person, place, and time.   Skin: No rash noted. She is not diaphoretic. No erythema. No pallor.   Psychiatric: She has a normal mood and affect. Her behavior is normal. Judgment and thought content normal.       No results found for: NA, K, CL, CO2, BUN, CREATININE, GLU, HGBA1C, MG, AST, ALT, ALBUMIN, PROT, BILITOT, WBC, HGB, HCT, MCV, PLT, INR, TSH, CHOL, HDL, LDLCALC, TRIG, BNP  Assessment:      1. Essential hypertension    2. Palpitations    3. Other specified hypothyroidism        Plan:   1. HTN with palpitations  - start Coreg  - discussed low salt diet  - cont diet/exercise    2. Bradycardia, sinus - resolved  - cont to monitor  - asymptomatic     3. Hypothyroidism  - cont meds per PCP    Thank you for allowing me to participate in this patient's care. Please do not hesitate to contact me with any questions or concerns. Consult note has been forwarded to the referral physician.

## 2020-05-08 ENCOUNTER — PATIENT MESSAGE (OUTPATIENT)
Dept: CARDIOLOGY | Facility: CLINIC | Age: 55
End: 2020-05-08

## 2020-05-08 NOTE — TELEPHONE ENCOUNTER
I called the patient feels tired. She denies any swelling or chest pain, but she is slightly short of breath. She recorded her blood pressure at 129/72 61 and 124/71 60, but these were not taken when she felt tired. I asked her to take her blood pressure again and it was 129/67 63.  Please advise.

## 2020-06-19 DIAGNOSIS — I10 ESSENTIAL HYPERTENSION: Primary | ICD-10-CM

## 2020-06-22 ENCOUNTER — HOSPITAL ENCOUNTER (OUTPATIENT)
Dept: CARDIOLOGY | Facility: HOSPITAL | Age: 55
Discharge: HOME | End: 2020-06-22
Attending: INTERNAL MEDICINE
Payer: COMMERCIAL

## 2020-06-22 ENCOUNTER — OFFICE VISIT (OUTPATIENT)
Dept: CARDIOLOGY | Facility: CLINIC | Age: 55
End: 2020-06-22
Payer: COMMERCIAL

## 2020-06-22 VITALS
BODY MASS INDEX: 24.75 KG/M2 | BODY MASS INDEX: 24.75 KG/M2 | SYSTOLIC BLOOD PRESSURE: 149 MMHG | DIASTOLIC BLOOD PRESSURE: 70 MMHG | WEIGHT: 154 LBS | HEIGHT: 66 IN | SYSTOLIC BLOOD PRESSURE: 149 MMHG | HEIGHT: 66 IN | WEIGHT: 154 LBS | DIASTOLIC BLOOD PRESSURE: 80 MMHG

## 2020-06-22 VITALS
OXYGEN SATURATION: 98 % | BODY MASS INDEX: 24.75 KG/M2 | DIASTOLIC BLOOD PRESSURE: 68 MMHG | HEART RATE: 53 BPM | WEIGHT: 154 LBS | SYSTOLIC BLOOD PRESSURE: 114 MMHG | HEIGHT: 66 IN

## 2020-06-22 DIAGNOSIS — M79.603 PAIN AND SWELLING OF UPPER EXTREMITY, UNSPECIFIED LATERALITY: ICD-10-CM

## 2020-06-22 DIAGNOSIS — I10 ESSENTIAL HYPERTENSION: ICD-10-CM

## 2020-06-22 DIAGNOSIS — I10 ESSENTIAL HYPERTENSION: Primary | ICD-10-CM

## 2020-06-22 DIAGNOSIS — I49.3 PVC'S (PREMATURE VENTRICULAR CONTRACTIONS): ICD-10-CM

## 2020-06-22 DIAGNOSIS — E03.8 OTHER SPECIFIED HYPOTHYROIDISM: ICD-10-CM

## 2020-06-22 DIAGNOSIS — M25.50 ARTHRALGIA, UNSPECIFIED JOINT: ICD-10-CM

## 2020-06-22 DIAGNOSIS — M79.89 PAIN AND SWELLING OF UPPER EXTREMITY, UNSPECIFIED LATERALITY: ICD-10-CM

## 2020-06-22 DIAGNOSIS — G47.39 OTHER SLEEP APNEA: ICD-10-CM

## 2020-06-22 LAB
LEFT WRIST BRACHIAL INDEX: 1.07 WBI
RIGHT WRIST BRACHIAL INDEX: 1.06 WBI
UPPER ARTERIAL LEFT ARM AXILLARY SYS MAX: 68 CM/S
UPPER ARTERIAL LEFT ARM BRACHIAL SYS MAX: 67 CM/S
UPPER ARTERIAL LEFT ARM RADIAL SYS MAX: 51 CM/S
UPPER ARTERIAL LEFT ARM SUBCLAVIAN SYS MAX: 90 CM/S
UPPER ARTERIAL LEFT ARM ULNAR SYS MAX: 36 CM/S
UPPER ARTERIAL RIGHT ARM AXILLARY SYS MAX: 75 CM/S
UPPER ARTERIAL RIGHT ARM BRACHIAL SYS MAX: 85 CM/S
UPPER ARTERIAL RIGHT ARM RADIAL SYS MAX: 60 CM/S
UPPER ARTERIAL RIGHT ARM SUBCLAVIAN SYS MAX: 84 CM/S
UPPER ARTERIAL RIGHT ARM ULNAR SYS MAX: 57 CM/S

## 2020-06-22 PROCEDURE — 93930 UPPER EXTREMITY STUDY: CPT | Mod: 50

## 2020-06-22 PROCEDURE — 3074F PR MOST RECENT SYSTOLIC BLOOD PRESSURE < 130 MM HG: ICD-10-PCS | Mod: CPTII,S$GLB,, | Performed by: INTERNAL MEDICINE

## 2020-06-22 PROCEDURE — 93970 EXTREMITY STUDY: CPT | Mod: 26,,, | Performed by: INTERNAL MEDICINE

## 2020-06-22 PROCEDURE — 99999 PR PBB SHADOW E&M-EST. PATIENT-LVL IV: ICD-10-PCS | Mod: PBBFAC,,, | Performed by: INTERNAL MEDICINE

## 2020-06-22 PROCEDURE — 3008F BODY MASS INDEX DOCD: CPT | Mod: CPTII,S$GLB,, | Performed by: INTERNAL MEDICINE

## 2020-06-22 PROCEDURE — 93005 ELECTROCARDIOGRAM TRACING: CPT

## 2020-06-22 PROCEDURE — 3008F PR BODY MASS INDEX (BMI) DOCUMENTED: ICD-10-PCS | Mod: CPTII,S$GLB,, | Performed by: INTERNAL MEDICINE

## 2020-06-22 PROCEDURE — 3078F DIAST BP <80 MM HG: CPT | Mod: CPTII,S$GLB,, | Performed by: INTERNAL MEDICINE

## 2020-06-22 PROCEDURE — 93930 UPPER EXTREMITY STUDY: CPT | Mod: 26,,, | Performed by: INTERNAL MEDICINE

## 2020-06-22 PROCEDURE — 3078F PR MOST RECENT DIASTOLIC BLOOD PRESSURE < 80 MM HG: ICD-10-PCS | Mod: CPTII,S$GLB,, | Performed by: INTERNAL MEDICINE

## 2020-06-22 PROCEDURE — 99214 OFFICE O/P EST MOD 30 MIN: CPT | Mod: 25,S$GLB,, | Performed by: INTERNAL MEDICINE

## 2020-06-22 PROCEDURE — 93970 EXTREMITY STUDY: CPT | Mod: 50

## 2020-06-22 PROCEDURE — 93010 ELECTROCARDIOGRAM REPORT: CPT | Mod: ,,, | Performed by: INTERNAL MEDICINE

## 2020-06-22 PROCEDURE — 99999 PR PBB SHADOW E&M-EST. PATIENT-LVL IV: CPT | Mod: PBBFAC,,, | Performed by: INTERNAL MEDICINE

## 2020-06-22 PROCEDURE — 93010 EKG 12-LEAD: ICD-10-PCS | Mod: ,,, | Performed by: INTERNAL MEDICINE

## 2020-06-22 PROCEDURE — 99214 PR OFFICE/OUTPT VISIT, EST, LEVL IV, 30-39 MIN: ICD-10-PCS | Mod: 25,S$GLB,, | Performed by: INTERNAL MEDICINE

## 2020-06-22 PROCEDURE — 3074F SYST BP LT 130 MM HG: CPT | Mod: CPTII,S$GLB,, | Performed by: INTERNAL MEDICINE

## 2020-06-22 PROCEDURE — 93970 CV US DOPPLER VENOUS ARMS BILATERAL (CUPID ONLY): ICD-10-PCS | Mod: 26,,, | Performed by: INTERNAL MEDICINE

## 2020-06-22 PROCEDURE — 93930 CV US DOPPLER ARTERIAL ARMS BILATERAL (CUPID ONLY): ICD-10-PCS | Mod: 26,,, | Performed by: INTERNAL MEDICINE

## 2020-06-22 RX ORDER — NEBIVOLOL 5 MG/1
5 TABLET ORAL DAILY
Qty: 60 TABLET | Refills: 3
Start: 2020-06-22 | End: 2020-06-22 | Stop reason: SDUPTHER

## 2020-06-22 RX ORDER — NEBIVOLOL 5 MG/1
5 TABLET ORAL NIGHTLY
Qty: 60 TABLET | Refills: 3 | Status: SHIPPED | OUTPATIENT
Start: 2020-06-22 | End: 2020-07-02

## 2020-06-22 NOTE — PROGRESS NOTES
Subjective:   Patient ID:  Kayleigh Frazier is a 55 y.o. female who presents for cardiac consult of Follow-up      HPI  The patient came in today for cardiac consult of Follow-up    PCP - Dr. Ari Sims    Kayleigh Frazier is a 55 y.o. female  HTN, thyroidectomy due to thyroid nodules, hypothyroidism presents for follow up CV eval.     1/19/18  Pt has been having headaches last week. BP was elevated at [facility] 184/88, 169/93, 148/75. PT went to hospital, slipped on ice head her head on concrete, no syncope, likely concussion but was wearing a hat/cap - went to [facility].. She was given Tylenol, BP was 176/75 initialy then 173/70s, then 152/85. Did not have CT head due to normal neuro exam. 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     7/6/18  Pt had 2D ECHO in Jan 2018 which revealed normal BiV function and normal PA pressure 19 mmHg. BP is well controlled. Pt had MRI in Jan, pt has significant allergies and is due to see allergy specialist. PT does have occ headaches which improve with allergy meds.     5/6/20  She switched jobs working at [employer]. She helps with special projects. For the past two weeks has been having headcahes, she has been caring for her mom as well. She is unsure if anxiety causing issues. Last night 162/91 - 132/86, she just took a hot bath and came over after talking with her mother.     6/22/20  Started Coreg after last visit but could not function with it, HR was low. She followed up with PCP and held BB, thyroid meds changed. SHe was started on Bystolic but has knuckle pain, and arm pain. She had workup for inflammation, was abnormal but no RA or further workup.   ECG - NSR, V rate 66 with PVCs    Patient feels no chest pain, no sob, no leg swelling, no PND,  no dizziness, no syncope, no CNS symptoms.    Patient has fairly good exercise tolerance.    Patient is compliant with medications.    1/19/18 ECG - Sinus bradycadia, V rate 48    FH -  Grandmother - Stroke in 60s, mult strokes in family    2D ECHO  CONCLUSIONS     1 - No wall motion abnormalities.     2 - Normal left ventricular systolic function (EF 60-65%).     3 - Normal left ventricular diastolic function.     4 - Normal right ventricular systolic function .     5 - The estimated PA systolic pressure is 19 mmHg.     This document has been electronically    SIGNED BY: Beatris Salinas MD On: 01/24/2018 17:35    Past Medical History:   Diagnosis Date    Essential hypertension 1/19/2018    Heart murmur        History reviewed. No pertinent surgical history.    Social History     Tobacco Use    Smoking status: Never Smoker    Smokeless tobacco: Never Used   Substance Use Topics    Alcohol use: Yes     Comment: occassionally    Drug use: No       Family History   Problem Relation Age of Onset    Hypertension Mother        Patient's Medications   New Prescriptions    NEBIVOLOL (BYSTOLIC) 5 MG TAB    Take 1 tablet (5 mg total) by mouth once daily.   Previous Medications    ERGOCALCIFEROL, VITAMIN D2, (VITAMIN D ORAL)    Take 10,000 Units by mouth every 7 days.     LEVOTHYROXINE (SYNTHROID) 112 MCG TABLET    Take 125 mcg by mouth once daily.    LIOTHYRONINE (CYTOMEL) 5 MCG TAB    Take 5 mcg by mouth once daily.    Modified Medications    No medications on file   Discontinued Medications    CARVEDILOL (COREG) 3.125 MG TABLET    Take 1 tablet (3.125 mg total) by mouth 2 (two) times daily with meals.       Review of Systems   Constitutional: Negative.    HENT: Negative.    Eyes: Negative.    Respiratory: Negative.    Cardiovascular: Positive for palpitations.   Gastrointestinal: Negative.    Genitourinary: Negative.    Musculoskeletal: Positive for joint pain.   Skin: Negative.    Neurological: Negative.    Endo/Heme/Allergies: Negative.    Psychiatric/Behavioral: The patient is nervous/anxious.    All 12 systems otherwise negative.      Wt Readings from Last 3 Encounters:   06/22/20 69.9 kg (154 lb)  "  04/29/19 64 kg (141 lb)   07/06/18 64.1 kg (141 lb 5 oz)     Temp Readings from Last 3 Encounters:   No data found for Temp     BP Readings from Last 3 Encounters:   06/22/20 114/68   04/29/19 (!) 163/83   07/06/18 126/76     Pulse Readings from Last 3 Encounters:   06/22/20 (!) 53   04/29/19 60   07/06/18 (!) 56       /68 (BP Location: Right arm, Patient Position: Sitting, BP Method: Medium (Manual))   Pulse (!) 53   Ht 5' 6" (1.676 m)   Wt 69.9 kg (154 lb)   SpO2 98%   BMI 24.86 kg/m²      Objective:   Physical Exam   Constitutional: She is oriented to person, place, and time. She appears well-developed and well-nourished. No distress.   HENT:   Head: Normocephalic and atraumatic.   Nose: Nose normal.   Mouth/Throat: Oropharynx is clear and moist. No oropharyngeal exudate.   Eyes: Conjunctivae and EOM are normal. Right eye exhibits no discharge. Left eye exhibits no discharge. No scleral icterus.   Neck: Normal range of motion. Neck supple. No JVD present. No thyromegaly present.   Cardiovascular: Normal rate, regular rhythm, S1 normal and S2 normal. Exam reveals no gallop, no S3, no S4 and no friction rub.   No murmur heard.  Pulmonary/Chest: Effort normal and breath sounds normal. No stridor. No respiratory distress. She has no wheezes. She has no rales. She exhibits no tenderness.   Abdominal: Soft. Bowel sounds are normal. She exhibits no distension and no mass. There is no abdominal tenderness. There is no rebound.   Genitourinary:    Genitourinary Comments: Deferred     Musculoskeletal: Normal range of motion.         General: No tenderness, deformity or edema.   Lymphadenopathy:     She has no cervical adenopathy.   Neurological: She is alert and oriented to person, place, and time. She exhibits normal muscle tone. Coordination normal.   Skin: Skin is warm and dry. No rash noted. She is not diaphoretic. No erythema. No pallor.   Psychiatric: She has a normal mood and affect. Her behavior is " normal. Judgment and thought content normal.   Nursing note and vitals reviewed.      No results found for: NA, K, CL, CO2, BUN, CREATININE, GLU, HGBA1C, MG, AST, ALT, ALBUMIN, PROT, BILITOT, WBC, HGB, HCT, MCV, PLT, INR, TSH, CHOL, HDL, LDLCALC, TRIG, BNP  Assessment:      1. Essential hypertension    2. Other specified hypothyroidism    3. PVC's (premature ventricular contractions)    4. Other sleep apnea    5. Arthralgia, unspecified joint    6. Pain and swelling of upper extremity, unspecified laterality        Plan:   1. HTN with palpitations, PVCS noted  - cont BB, order Holter  - discussed low salt diet  - cont diet/exercise  - r/o JORGE  - on coreg and Norvasc in past    2. Bradycardia, sinus - resolved  - cont to monitor  - asymptomatic     3. Hypothyroidism  - cont meds per PCP - recently increased dose to 125  - will have further labs soon    4. Joint pain with arm swelling  - refer to rheum  - order arm ultrasounds - arterial and venous    Thank you for allowing me to participate in this patient's care. Please do not hesitate to contact me with any questions or concerns. Consult note has been forwarded to the referral physician.

## 2020-06-22 NOTE — PATIENT INSTRUCTIONS
Understanding Premature Ventricular Contractions (PVCs)  Premature ventricular contractions (PVCs) are a type of abnormal heartbeat (arrhythmia). They are very common. They can occur in people of all ages from time to time. They usually cause only mild symptoms.  How PVCs happen    Your heart has 4 chambers: 2 upper atria and 2 lower ventricles. Normally, a special group of cells begins the signal to start your heartbeat. These cells are in the sinoatrial (SA) node in the right atrium. The signal quickly travels down your hearts conducting system. It travels to the left and right ventricle. As it travels, the signal triggers nearby parts of your heart to contract. This allows your heart to squeeze in a coordinated way.  During a premature ventricular contraction, the signal to start your heartbeat instead comes from one of the ventricles. This signal is premature, meaning it happens before the SA node has had a chance to fire. The signal spreads through the rest of your heart, causing a heartbeat. If this happens very soon after the previous heartbeat, your heart will push out very little blood. This causes a feeling of a pause between beats. If it happens a little later, your heart pushes out an almost normal amount of blood. This leads to a feeling of an extra heartbeat. So, the heart has a premature heartbeat in between normal heartbeats.  What causes PVCs?  Certain things can help set off a premature signal in the ventricles. These include:  · Reduced blood flow to your heart  · Scarring after a heart attack (myocardial infarction)  · Electrolyte problems, such as low sodium or potassium levels  · Increased adrenaline, such as with anxiety  · Certain medicines, like digoxin  Many heart conditions raise the risk for PVCs. These include:  · Mitral valve prolapse  · High blood pressure  · Heart attack  · Coronary heart disease  · cardiomyopathy  · Hypertrophic cardiomyopathy  · Congenital heart disease  They  often happen in people without any heart disease. However, PVCs are somewhat more common in people with some kind of heart disease.  Symptoms of PVCs  Most people with occasional PVCs dont have symptoms. You are also more likely to have symptoms if you have PVCs often. When symptoms do happen, they are usually minor. Symptoms may include:  · An awareness of the heart beating  · A fluttering or flip-flop feeling in your chest  · Feeling of a skipped or extra heartbeat  · Dizziness and near-fainting  · A pulsing sensation in the neck  PVCs may cause more severe symptoms if you have another heart problem, such as heart failure.  Diagnosing PVCs  Your healthcare provider will ask about your health history and give you a physical exam. An electrocardiogram (ECG) is the main test for diagnosis. This test allows your provider to look at the signal of your heartbeat for a brief time. Any PVCs that occur during this time will show up on the ECG. In some cases, your healthcare provider might advise ECG monitoring over a day or more, up to 30 days. This can help to catch PVCs that dont happen often. This is done with a monitor you wear night and day for the test period.  These may be the only tests your healthcare provider will need. You may need more testing if you have PVCs often, or many in a row. Your provider may look at other causes, including possible heart problems. These tests might include:  · Echocardiography, to look at your hearts structure and function  · Cardiac stress testing, to see how your heart responds to exercise and to evaluate blood flow through your heart  · Blood tests, to check potassium and thyroid levels  Date Last Reviewed: 2/17/2015  © 7109-4672 [employer]. [address] [place] [address], [place] [address]. All rights reserved. This information is not intended as a substitute for professional medical care. Always follow your healthcare professional's instructions.

## 2020-06-23 ENCOUNTER — PATIENT MESSAGE (OUTPATIENT)
Dept: RHEUMATOLOGY | Facility: CLINIC | Age: 55
End: 2020-06-23

## 2020-06-23 ENCOUNTER — OFFICE VISIT (OUTPATIENT)
Dept: RHEUMATOLOGY | Facility: CLINIC | Age: 55
End: 2020-06-23
Payer: COMMERCIAL

## 2020-06-23 ENCOUNTER — HOSPITAL ENCOUNTER (OUTPATIENT)
Dept: RADIOLOGY | Facility: HOSPITAL | Age: 55
Discharge: HOME | End: 2020-06-23
Attending: INTERNAL MEDICINE
Payer: COMMERCIAL

## 2020-06-23 VITALS — BODY MASS INDEX: 25.12 KG/M2 | WEIGHT: 155.63 LBS

## 2020-06-23 DIAGNOSIS — M25.50 ARTHRALGIA, UNSPECIFIED JOINT: ICD-10-CM

## 2020-06-23 DIAGNOSIS — M25.50 ARTHRALGIA, UNSPECIFIED JOINT: Primary | ICD-10-CM

## 2020-06-23 DIAGNOSIS — Z71.89 COUNSELING ON HEALTH PROMOTION AND DISEASE PREVENTION: ICD-10-CM

## 2020-06-23 PROCEDURE — 99244 PR OFFICE CONSULTATION,LEVEL IV: ICD-10-PCS | Mod: S$GLB,,, | Performed by: INTERNAL MEDICINE

## 2020-06-23 PROCEDURE — 99244 OFF/OP CNSLTJ NEW/EST MOD 40: CPT | Mod: S$GLB,,, | Performed by: INTERNAL MEDICINE

## 2020-06-23 PROCEDURE — 73130 X-RAY EXAM OF HAND: CPT | Mod: 26,,, | Performed by: RADIOLOGY

## 2020-06-23 PROCEDURE — 73130 X-RAY EXAM OF HAND: CPT | Mod: TC,50

## 2020-06-23 PROCEDURE — 73130 XR HAND COMPLETE 3 VIEWS BILATERAL: ICD-10-PCS | Mod: 26,,, | Performed by: RADIOLOGY

## 2020-06-23 PROCEDURE — 99999 PR PBB SHADOW E&M-EST. PATIENT-LVL III: CPT | Mod: PBBFAC,,, | Performed by: INTERNAL MEDICINE

## 2020-06-23 PROCEDURE — 99999 PR PBB SHADOW E&M-EST. PATIENT-LVL III: ICD-10-PCS | Mod: PBBFAC,,, | Performed by: INTERNAL MEDICINE

## 2020-06-23 RX ORDER — GABAPENTIN 100 MG/1
100 CAPSULE ORAL NIGHTLY
Qty: 30 CAPSULE | Refills: 0 | Status: SHIPPED | OUTPATIENT
Start: 2020-06-23 | End: 2020-06-23

## 2020-06-23 RX ORDER — MONTELUKAST SODIUM 10 MG/1
TABLET ORAL
COMMUNITY
Start: 2020-05-18 | End: 2021-01-27

## 2020-06-23 RX ORDER — NABUMETONE 500 MG/1
500 TABLET, FILM COATED ORAL 2 TIMES DAILY
Qty: 60 TABLET | Refills: 0 | Status: SHIPPED | OUTPATIENT
Start: 2020-06-23 | End: 2020-07-07

## 2020-06-23 RX ORDER — DEXBROMPHENIRAMINE MALEATE, DEXTROMETHORPHAN HBR, PHENYLEPHRINE HCL 2; 20; 10 G/1; G/1; G/1
TABLET ORAL
COMMUNITY
Start: 2020-05-18 | End: 2021-07-30

## 2020-06-23 NOTE — PATIENT INSTRUCTIONS
Understanding Lateral Epicondylitis    Tendons are strong bands of tissue that connect muscles to bones. Lateral epicondylitis affects the tendons that connect muscles in the forearm to the lateral epicondyle. This is the bony knob on the outer side of the elbow. The condition occurs if the extensor tendons of the wrist become red and swollen (irritated). This can cause pain in the elbow, forearm, and wrist. Because the condition is sometimes caused by playing tennis, it is also known as tennis elbow.  How to say it  LA-tuhr-cash [id number]   What causes lateral epicondylitis?  The condition most often occurs because of overuse. This can be from any activity that repeatedly puts stress on the forearm extensor muscles or tendons and wrist. For instance, playing tennis, lifting weights, cutting meat, painting, and typing can all cause the condition. Wear and tear of the tendons from aging or an injury to the tendons can also cause the condition.  Symptoms of lateral epicondylitis  The most common symptom is pain. You may feel it on the outer side of the elbow and down the back of the forearm. It may be worse when moving or using the elbow, forearm, or wrist. You may also feel pain when gripping or lifting things.  Treatment for lateral epicondylitis  Treatments may include:  · Resting the elbow, forearm, and wrist. Youll need to avoid movements that can make your symptoms worse. You also may need to avoid certain sports and types of work for a time. This helps relieve symptoms and prevent further damage to the tendons.  · Changing the action that caused the problem. For instance, if the tendons were damaged from playing tennis, it may help to change your playing technique or use different equipment. This helps prevent further damage to the tendons.  · Using cold packs. Putting an ice pack on the injured area can help reduce pain and swelling.  · Taking pain medicines. Taking prescription or  over-the-counter pain medicines may help reduce pain and swelling.    · Wearing a brace. This helps reduce strain on the muscles and tendons in the forearm, which may relieve symptoms. It is very important to wear the brace properly.  · Doing exercises and physical therapy. These help improve strength and range of motion in the elbow, forearm, and wrist.  · Getting shots of medicine into the injured area. These may help relieve symptoms for a time.  · Having surgery. This may be an option if other treatments fail to relieve symptoms. In many cases, the surgeon removes the damaged tissue.  Possible complications of lateral epicondylitis  If the tendons involved dont heal properly, symptoms may return or get worse. To help prevent this, follow your treatment plan as directed.  When to call your healthcare provider  Call your healthcare provider right away if you have any of these:  · Fever of 100.4°F (38°C) or higher, or as directed  · Redness, swelling, or warmth in the elbow or forearm that gets worse  · Symptoms that dont get better with treatment, or get worse  · New symptoms   Date Last Reviewed: 3/10/2016  © 6764-8397 [employer]. [address] [place] [address]. All rights reserved. This information is not intended as a substitute for professional medical care. Always follow your healthcare professional's instructions.        Understanding Medial Epicondylitis    Several muscles attach to the arm at the elbow joint. The tough bands of tissue that attach muscle to bones are called tendons. The bone in the upper arm has knobs on the farthest end called epicondyles. Tendons attach some arm muscles to these knobs. The tissues in this area can become irritated.  Epicondylitis is the medical term for a painful elbow over the epicondyle. Medial refers to the inner side of the elbow. Medial epicondylitis is sometimes called golfers elbow.     How to say it  [place]-falguni-cash  [id number]   Causes of medial epicondylitis  A painful inner elbow may be caused by:  · Using an elbow or hand the same way over and over  · Using poor form or too much force in a sport such as golf, tennis, or baseball  · Lifting too heavy a weight  · Other injuries to the arm or elbow  Symptoms of medial epicondylitis  · Pain or tenderness on the inside of the elbow that may travel down the forearm  · Pain when moving the wrist  · Pain or weakness when gripping something  · A crackling sound or grating feeling when moving the elbow  Treatment for medial epicondylitis  Treatments may include:  · Avoiding or changing the action that caused the problem. This helps prevent irritating the tissues more.  · Prescription or over-the-counter pain medicines. These help reduce inflammation, swelling, and pain.  · Cold or heat packs. These help reduce pain and swelling.  · Stretching and other exercises. These improve flexibility and strength.  · Physical therapy. This may include exercises or other treatments.  · Injections of medicine. This may relieve symptoms.  If other treatments do not relieve symptoms, you may need surgery.  Possible complications  If you dont give your elbow time to heal, symptoms may return or get worse. Follow your healthcare providers instructions on resting and treating your elbow.     When to call your healthcare provider  Call your healthcare provider right away if you have any of these:  · Fever of 100.4°F (38°C) or higher, or as directed  · Redness, swelling, or warmth that gets worse  · Symptoms that dont get better with prescribed medicines, or get worse  · New symptoms   Date Last Reviewed: 3/10/2016  © 4359-2314 [employer]. [address] [place] [id number]. All rights reserved. This information is not intended as a substitute for professional medical care. Always follow your healthcare professional's instructions.        Toe Extension  (Flexibility)    These instructions are for your right foot. Switch sides for your left foot.  1. Sit in a chair. Rest your right ankle on your left knee.  2. Hold your toes with your right hand. Gently bend the toes backward. Feel a stretch in the undersides of the toes and ball of the foot. Hold for 30 to 60 seconds.  3. Then gently bend the toes in the other direction. Gently press on them until your foot is pointed. Hold for 30 to 60 seconds.  4. Repeat 5 times, or as instructed.  Date Last Reviewed: 5/1/2016  © 3107-0313 [employer]. [address] [place], [place] [id number]. All rights reserved. This information is not intended as a substitute for professional medical care. Always follow your healthcare professional's instructions.        Plantar Fasciitis  Plantar fasciitis is a painful swelling of the plantar fascia. The plantar fascia is a thick, fibrous layer of tissue. It covers the bones on the bottom of your foot. And it supports the foot bones in an arched position.  This can happen gradually or suddenly. It usually affects one foot at a time. Heel pain can be sharp, like a knife sticking into the bottom of your foot. You may feel pain after exercising, long-distance jogging, stair climbing, long periods of standing, or after standing up.  Risk factors include: non-active lifestyle, arthritis, diabetes, obesity or recent weight gain, flat foot, high arch. Wearing high heels, loose shoes, or shoes with poor arch support for long periods of time adds to the risk. This problem is commonly found in runners and dancers. It also found in people who stand on hard surfaces for long periods of time.  Foot pain from this condition is usually worse in the morning. But it improves with walking. By the end of the day there may be a dull aching. Treatment requires short-term rest and controlling swelling. It may take up to 9 months before all symptoms go away. Rarely, a steroid injection into the foot,  or surgery, may be needed.  Home care  · If you are overweight, lose weight to help healing.  · Choose supportive shoes with good arch support and shock absorbency. Replace athletic shoes when they become worn out. Dont walk or run barefoot.  · Premade or custom-fitted shoe inserts may be helpful. Inserts made of silicone seem to be the most effective. Custom-made inserts can be provided by a podiatrist or foot specialist, physical therapist, or orthopedist.  · Premade or custom-made night splints keep the heel stretched out while you sleep. They may prevent morning pain.  · Avoid activities that stress the feet: jogging, prolonged standing or walking, contact sports, etc.  · First thing in the morning and before sports, stretch the bottom of your feet. Gently flex your ankle so the toes move toward your knee.  · Icing may help control heel pain. Apply an ice pack to the heel for 10-20 minutes as a preventive. Or ice your heel after a severe flare-up of symptoms. You may repeat this every 1-2 hours as needed.  · You may use over-the-counter pain medicine to control pain, unless another medicine was prescribed. Anti-inflammatory pain medicines, such as ibuprofen or naproxen, may work better than acetaminophen. If you have chronic liver or kidney disease or ever had a stomach ulcer or GI bleeding, talk with your healthcare provider before using these medicines.  Follow-up care  Follow up with your healthcare provider, physical therapist, or podiatrist or foot specialist as advised.  Call for an appointment if pain worsens or there is no relief after a few weeks of home treatment. Shoe inserts, a night splint, or a special boot may be required.  If X-rays were taken, you will be told of any new findings that may affect your care.  When to seek medical advice  Call your healthcare provider right away if any of these occur:  · Foot swelling  · Redness with increasing pain  Date Last Reviewed: 11/21/2015  © 2311-4156 The  [employer]. [address] [place] [address], [place] [address]. All rights reserved. This information is not intended as a substitute for professional medical care. Always follow your healthcare professional's instructions.        Treating Plantar Fasciitis  First, your healthcare provider tries to determine the cause of your problem in order to suggest ways to relieve pain. If your pain is due to poor foot mechanics, custom-made shoe inserts (orthoses) may help.    Reduce symptoms  · To relieve mild symptoms, try aspirin, ibuprofen, or other medicines as directed. Rubbing ice on the affected area may also help.  · To reduce severe pain and swelling, your healthcare provider may prescribe pills or injections or a walking cast in some instances. Physical therapy, such as ultrasound or a daily stretching program, may also be recommended. Surgery is rarely required.  · To reduce symptoms caused by poor foot mechanics, your foot may be taped. This supports the arch and temporarily controls movement. Night splints may also help by stretching the fascia.  Control movement  If taping helps, your healthcare provider may prescribe orthoses. Built from plaster casts of your feet, these inserts control the way your foot moves. As a result, your symptoms should go away.  Reduce overuse  Every time your foot strikes the ground, the plantar fascia is stretched. You can reduce the strain on the plantar fascia and the possibility of overuse by following these suggestions:  · Lose any excess weight.  · Avoid running on hard or uneven ground.  · Use orthoses at all times in your shoes and house slippers.  If surgery is needed  Your healthcare provider may consider surgery if other types of treatment don't control your pain. During surgery, the plantar fascia is partially cut to release tension. As you heal, fibrous tissue fills the space between the heel bone and the plantar fascia.   Date Last Reviewed: 10/14/2015  © 2599-4855 The  [employer]. [address] [place] [address], [place] [address]. All rights reserved. This information is not intended as a substitute for professional medical care. Always follow your healthcare professional's instructions.        Nabumetone tablets  What is this medicine?  NABUMETONE (na BYOO me tone) is a non-steroidal anti-inflammatory drug (NSAID). It is used to reduce swelling and to treat pain. It is used for osteoarthritis or rheumatoid arthritis.  How should I use this medicine?  Take this medicine by mouth with a full glass of water. Follow the directions on the prescription label. You can take it with or without food. If it upsets your stomach, take it with food. Try to not lie down for at least 10 minutes after you take this medicine. Take your medicine at regular intervals. Do not take your medicine more often than directed. Long term, continuous use may increase the risk of heart attack or stroke.  A special MedGuide will be given to you by the pharmacist with each prescription and refill. Be sure to read this information carefully each time.  Talk to your pediatrician regarding the use of this medicine in children. Special care may be needed.  What side effects may I notice from receiving this medicine?  Side effects that you should report to your doctor or health care professional as soon as possible:  · black or bloody stools, blood in the urine or vomit  · blurred vision  · chest pain  · difficulty breathing or wheezing  · nausea or vomiting  · skin rash, skin redness, blistering or peeling skin, hives, or itching  · slurred speech or weakness on one side of the body  · severe stomach pain  · swelling of eyelids, throat, lips  · unexplained weight gain or swelling  · unusually weak or tired  · yellowing of eyes or skin  Side effects that usually do not require medical attention (report to your doctor or health care professional if they continue or are bothersome):  · constipation or  diarrhea  · gas or heartburn  What may interact with this medicine?  · alcohol  · aspirin  · cidofovir  · diuretics  · lithium  · medicines for high blood pressure  · methotrexate  · other drugs for inflammation like ketorolac, ibuprofen, and prednisone  · pemetrexed  · warfarin  What if I miss a dose?  If you miss a dose, take it as soon as you can. If it is almost time for your next dose, take only that dose. Do not take double or extra doses.  Where should I keep my medicine?  Keep out of the reach of children.  Store at room temperature between 15 and 30 degrees C (59 and 86 degrees F). Keep container tightly closed. Throw away any unused medicine after the expiration date.  What should I tell my health care provider before I take this medicine?  They need to know if you have any of these conditions:  · asthma  · cigarette smoker  · drink more than 3 alcohol containing drinks a day  · heart disease or circulation problems such as heart failure or leg edema (fluid retention)  · hemophilia or bleeding problems  · high blood pressure  · kidney disease  · liver disease  · stomach bleeding or ulcers  · an unusual or allergic reaction to nabumetone, aspirin, other NSAIDs, other medicines, foods, dyes, or preservatives  · pregnant or trying to get pregnant  · breast-feeding  What should I watch for while using this medicine?  Tell your doctor or health care professional if your pain does not get better. Talk to your doctor before taking another medicine for pain. Do not treat yourself.  This medicine does not prevent heart attack or stroke. In fact, this medicine may increase the chance of a heart attack or stroke. The chance may increase with longer use of this medicine and in people who have heart disease. If you take aspirin to prevent heart attack or stroke, talk with your doctor or health care professional.  Do not take medicines such as ibuprofen and naproxen with this medicine. Side effects such as stomach  upset, nausea, or ulcers may be more likely to occur. Many medicines available without a prescription should not be taken with this medicine.  This medicine can cause ulcers and bleeding in the stomach and intestines at any time during treatment. Do not smoke cigarettes or drink alcohol. These increase irritation to your stomach and can make it more susceptible to damage from this medicine. Ulcers and bleeding can happen without warning symptoms and can cause death.  You may get drowsy or dizzy. Do not drive, use machinery, or do anything that needs mental alertness until you know how this medicine affects you. Do not stand or sit up quickly, especially if you are an older patient. This reduces the risk of dizzy or fainting spells.  This medicine can cause you to bleed more easily. Try to avoid damage to your teeth and gums when you brush or floss your teeth.  NOTE:This sheet is a summary. It may not cover all possible information. If you have questions about this medicine, talk to your doctor, pharmacist, or health care provider. Copyright© 2017 [employer]        Gabapentin capsules or tablets  What is this medicine?  GABAPENTIN (GA ba pen tin) is used to control partial seizures in adults with epilepsy. It is also used to treat certain types of nerve pain.  How should I use this medicine?  Take this medicine by mouth with a glass of water. Follow the directions on the prescription label. You can take it with or without food. If it upsets your stomach, take it with food.Take your medicine at regular intervals. Do not take it more often than directed. Do not stop taking except on your doctor's advice.  If you are directed to break the 600 or 800 mg tablets in half as part of your dose, the extra half tablet should be used for the next dose. If you have not used the extra half tablet within 28 days, it should be thrown away.  A special MedGuide will be given to you by the pharmacist with each prescription and  refill. Be sure to read this information carefully each time.  Talk to your pediatrician regarding the use of this medicine in children. Special care may be needed.  What side effects may I notice from receiving this medicine?  Side effects that you should report to your doctor or health care professional as soon as possible:  · allergic reactions like skin rash, itching or hives, swelling of the face, lips, or tongue  · worsening of mood, thoughts or actions of suicide or dying  Side effects that usually do not require medical attention (report to your doctor or health care professional if they continue or are bothersome):  · constipation  · difficulty walking or controlling muscle movements  · dizziness  · nausea  · slurred speech  · tiredness  · tremors  · weight gain  What may interact with this medicine?  Do not take this medicine with any of the following medications:  · other gabapentin products  This medicine may also interact with the following medications:  · alcohol  · antacids  · antihistamines for allergy, cough and cold  · certain medicines for anxiety or sleep  · certain medicines for depression or psychotic disturbances  · homatropine; hydrocodone  · naproxen  · narcotic medicines (opiates) for pain  · phenothiazines like chlorpromazine, mesoridazine, prochlorperazine, thioridazine  What if I miss a dose?  If you miss a dose, take it as soon as you can. If it is almost time for your next dose, take only that dose. Do not take double or extra doses.  Where should I keep my medicine?  Keep out of reach of children.  This medicine may cause accidental overdose and death if it taken by other adults, children, or pets. Mix any unused medicine with a substance like cat litter or coffee grounds. Then throw the medicine away in a sealed container like a sealed bag or a coffee can with a lid. Do not use the medicine after the expiration date.  Store at room temperature between 15 and 30 degrees C (59 and 86  degrees F).  What should I tell my health care provider before I take this medicine?  They need to know if you have any of these conditions:  · kidney disease  · suicidal thoughts, plans, or attempt; a previous suicide attempt by you or a family member  · an unusual or allergic reaction to gabapentin, other medicines, foods, dyes, or preservatives  · pregnant or trying to get pregnant  · breast-feeding  What should I watch for while using this medicine?  Visit your doctor or health care professional for regular checks on your progress. You may want to keep a record at home of how you feel your condition is responding to treatment. You may want to share this information with your doctor or health care professional at each visit. You should contact your doctor or health care professional if your seizures get worse or if you have any new types of seizures. Do not stop taking this medicine or any of your seizure medicines unless instructed by your doctor or health care professional. Stopping your medicine suddenly can increase your seizures or their severity.  Wear a medical identification bracelet or chain if you are taking this medicine for seizures, and carry a card that lists all your medications.  You may get drowsy, dizzy, or have blurred vision. Do not drive, use machinery, or do anything that needs mental alertness until you know how this medicine affects you. To reduce dizzy or fainting spells, do not sit or stand up quickly, especially if you are an older patient. Alcohol can increase drowsiness and dizziness. Avoid alcoholic drinks.  Your mouth may get dry. Chewing sugarless gum or sucking hard candy, and drinking plenty of water will help.  The use of this medicine may increase the chance of suicidal thoughts or actions. Pay special attention to how you are responding while on this medicine. Any worsening of mood, or thoughts of suicide or dying should be reported to your health care professional right  away.  Women who become pregnant while using this medicine may enroll in the [place] Antiepileptic Drug Pregnancy Registry by calling 1-701.918.2872. This registry collects information about the safety of antiepileptic drug use during pregnancy.  NOTE:This sheet is a summary. It may not cover all possible information. If you have questions about this medicine, talk to your doctor, pharmacist, or health care provider. Copyright© 2017 [employer]

## 2020-06-23 NOTE — PROGRESS NOTES
RHEUMATOLOGY OUTPATIENT CLINIC NOTE    6/23/2020    Attending Rheumatologist: Leighton Junior  Primary Care Provider: Ari Sims MD   Physician Requesting Consultation: Jose Antonio Mcdowell MD  [address] [facility]  [place] [address]  Chief Complaint/Reason For Consultation:  Joint Pain (nostly elbows & hands)    Subjective:       HPI  Kayleigh Frazier is a 55 y.o. Black or  female with generalized arthralgias referred for rheumatic evaluation.  Main complaint is bilateral hand joint pain.  Onset several years ago, worsening over the past 2 months without any particular precipitating event.  Worst in the evening, aggravated by range of motion/activity.  Relieved somewhat by rest and OTC pain medication.  Association with daytime fatigue and non restorative sleep.  Refers subjective joint swelling, not present today.  Denies any active rash or photosensitivity.  Does not have tri-color discoloration of fingertips upon cold exposure or ulcers.  Denies persisent paresthesias, fever, or hematuria.    Review of Systems   Constitutional: Positive for malaise/fatigue. Negative for chills and fever.   Eyes: Negative for pain and redness.   Respiratory: Negative for cough, hemoptysis and shortness of breath.         + snoring   Cardiovascular: Negative for chest pain and leg swelling.   Gastrointestinal: Negative for abdominal pain, blood in stool and melena.        No history IBD.   Genitourinary: Negative for dysuria and hematuria.        History of nephrolithiasis.   Musculoskeletal: Positive for joint pain (Generalized, worse on hands and elbows. Mixed pattern with prominent DJD and neuropathic features.) and neck pain (Chronic, mechanical features.  No alarm signs or symptoms.). Negative for falls.   Skin: Negative for rash.        No personal or family history of psoriasis.  No Raynaud phenomena or ulcers.   Neurological: Negative for tingling and focal weakness.   Psychiatric/Behavioral:  "Negative for memory loss (Mental fogginess). The patient does not have insomnia.      Chronic comorbid conditions affecting medical decision making today:  Past Medical History:   Diagnosis Date    Essential hypertension 1/19/2018    Heart murmur    · Thyroid disease  · Allergic rhinitis  · JORGE  · Fibromyalgia    No past surgical history on file.  Family History   Problem Relation Age of Onset    Hypertension Mother      Social History     Substance and Sexual Activity   Alcohol Use Yes    Comment: occassionally     Social History     Tobacco Use   Smoking Status Never Smoker   Smokeless Tobacco Never Used     Social History     Substance and Sexual Activity   Drug Use No       Current Outpatient Medications:     ALAHIST CF 2-10-20 mg Tab, TK 1 T PO BID PRN, Disp: , Rfl:     ERGOCALCIFEROL, VITAMIN D2, (VITAMIN D ORAL), Take 10,000 Units by mouth every 7 days. , Disp: , Rfl:     levothyroxine (SYNTHROID) 112 MCG tablet, Take 125 mcg by mouth once daily., Disp: , Rfl:     liothyronine (CYTOMEL) 5 MCG Tab, Take 5 mcg by mouth once daily. , Disp: , Rfl:     montelukast (SINGULAIR) 10 mg tablet, TK 1 T PO QD, Disp: , Rfl:     nebivoloL (BYSTOLIC) 5 MG Tab, Take 1 tablet (5 mg total) by mouth every evening., Disp: 60 tablet, Rfl: 3    nabumetone (RELAFEN) 500 MG tablet, Take 1 tablet (500 mg total) by mouth 2 (two) times daily. Every 12 hours for 2 weeks, then as needed for another 2 weeks. for 14 days, Disp: 60 tablet, Rfl: 0     Objective:         There were no vitals filed for this visit.  Physical Exam   Constitutional: She is oriented to person, place, and time. No distress.   Estimated body mass index is 25.12 kg/m² as calculated from the following:    Height as of 6/22/20: 5' 6" (1.676 m).    Weight as of this encounter: 70.6 kg (155 lb 10.3 oz).    Wt Readings from Last 1 Encounters:  06/23/20 0721 : 70.6 kg (155 lb 10.3 oz)     HENT:   Head: Normocephalic and atraumatic.   Eyes: Conjunctivae are " normal. Pupils are equal, round, and reactive to light.   Neck: Normal range of motion.   Cardiovascular: Normal rate and intact distal pulses.    Pulmonary/Chest: Effort normal. No respiratory distress.   Abdominal: Soft. She exhibits no distension.   Neurological: She is alert and oriented to person, place, and time.   Skin: No rash noted. No erythema.     Musculoskeletal: Normal range of motion. Tenderness (Allodynia, PIPs, lateral and medial epicondyle areas, anserine bursa, plantar aphasia, posterior tibialis tendons) present.      Comments: : strong  No synovitis or significant squeeze tenderness    AROM: intact  PROM: intact    Devices used by patient: none       Reviewed old and all outside pertinent medical records available.    All lab results personally reviewed and interpreted by me.  No results found for: WBC, HGB, HCT, MCV, MCH, MCHC, RDW, PLT, MPV, NEUTROABS, LYMPHOABS, MONOABS, EOSINOABS, BASOSABS, NEUTROPCT, LYMPHOPCT, MONOPCT, EOSINOPCT, BASOPCT, DIFFTYPE, RBCMORPHOLOG, PLTEST    No results found for: NA, K, CL, CO2, GLU, BUN, LABCREA, CALCIUM, PROT, ALBUMIN, BILITOT, AST, ALKPHOS, ALT, GFRAA, GFRNONAA    No results found for: COLORU, APPEARANCEUA, SPECGRAV, PHUR, PROTEINUA, GLUCOSEU, KETONESU, BLOODU, LEUKOCYTESUR, NITRITE, UROBILINOGEN    No results found for: CRP    No results found for: SEDRATE, ERYTHROCYTES    No results found for: BRINA, RF, SEDRATE    No components found for: 25OHVITDTOT, 66XMXFDR9, 08AWXNRN9, METHODNOTE    No results found for: URICACID    No components found for: TSPOTTB    Rheum Labs:   N/A    Infectious Labs:   n/a     Imaging:  All imaging reviewed and independently  interpreted by me.    X-ray hips June 2018   Both femoral heads are well seated within acetabula.  No significant joint space narrowing identified.  No plain film evidence to suggest AVN of either hip.  Multiple phleboliths noted within the pelvis.  There also some phleboliths seen overlying the left  "psoas margin.    MRI lumbar spine June 2018  L4-L5 and L5-S1 bilateral neural foraminal encroachment.  Findings are most pronounced at L5-S1 on the left.  Degenerative changes, left renal cysts.  The sacroiliac joints are intact and symmetric in appearance.     ASSESSMENT / PLAN:     Kayleigh Frazier is a 55 y.o. Black or  female with:    1. Arthralgia, unspecified joint  - generalized arthralgias with mixed pattern of pain.  Historical diagnosis of fibromyalgia.  - refers recent rheumatic workup unrevealing with the exception of "increased inflammation"  - features of epicondylitis, answering bursitis, posterior tibialis tendinosis  - replace current NSAIDs with Relafen.  Patient deferred gabapentin at this time  - topical therapy, activity modification.  - workup as below.  Recommend faxing recent workup to clinic for review  - Ambulatory referral/consult to Rheumatology  - CBC auto differential; Standing  - Comprehensive metabolic panel; Standing  - C-Reactive Protein; Standing  - Sedimentation rate; Standing  - Rheumatoid factor; Future  - Cyclic Citrullinated Peptide Antibody, IgG; Future  - BRINA Screen w/Reflex; Future  - Hepatitis Panel, Acute; Future  - TSH; Future  - Parvovirus B19 Antibody, IgG and IgM; Future  - Protein electrophoresis, serum; Standing  - Immunofixation electrophoresis; Standing  - X-Ray Hand Complete Bilateral; Future  - Urinalysis; Standing  - Protein / creatinine ratio, urine; Standing  - nabumetone (RELAFEN) 500 MG tablet; Take 1 tablet (500 mg total) by mouth 2 (two) times daily. Every 12 hours for 2 weeks, then as needed for another 2 weeks. for 14 days  Dispense: 60 tablet; Refill: 0    2. Other specified counseling  - over 10 minutes spent regarding below topics:  - Immunization counseling done.  - Nutrition and exercise counseling.  - Limitation of alcohol consumption.  - Regular exercise:  Aerobic and resistance.  - Medication counseling provided.    Follow up " in about 1 month (around 7/23/2020).    Method of contact with patient concerns: [facility]    Disclaimer:  This note is prepared using voice recognition software and as such is likely to have errors and has not been proof read. Please contact me for questions.     Time spent: 60 minutes in face to face discussion concerning diagnosis, prognosis, review of lab and test results, benefits of treatment as well as management of disease, counseling of patient and coordination of care between various health care providers.  Greater than half the time spent was used for coordination of care and counseling of patient.    Leighton Junior M.D.  Rheumatology Department   [facility]

## 2020-06-23 NOTE — LETTER
June 23, 2020      Jose Antonio Mcdowell MD  [address]  [place] [address]           [facility]  [address]  [place] [address]  Phone: [phone]  Fax: [phone]          Patient: Kayleigh Frazier   MR Number: [id number]   YOB: 1965   Date of Visit: 6/23/2020       Dear Dr. Jose Antonio Mcdowell:    Thank you for referring Kayleigh Frazier to me for evaluation. Attached you will find relevant portions of my assessment and plan of care.    If you have questions, please do not hesitate to call me. I look forward to following Kayleigh Frazier along with you.    Sincerely,    Leighton Junior MD    Enclosure  CC:  No Recipients    If you would like to receive this communication electronically, please contact [email or web address] or [phone] to request more information on [facility] Link access.    For providers and/or their staff who would like to refer a patient to [facility], please contact us through our one-stop-shop provider referral line, [facility], at [phone].    If you feel you have received this communication in error or would no longer like to receive these types of communications, please e-mail [email or web address]

## 2020-06-24 NOTE — TELEPHONE ENCOUNTER
Good Morning,       I've reviewed my test results.  I am not certain what each test are for.    My questions is will you are Dr. Junior explain.       Thanks!

## 2020-06-29 ENCOUNTER — HOSPITAL ENCOUNTER (OUTPATIENT)
Dept: CARDIOLOGY | Facility: HOSPITAL | Age: 55
Discharge: HOME | End: 2020-06-29
Attending: INTERNAL MEDICINE
Payer: COMMERCIAL

## 2020-06-29 DIAGNOSIS — I10 ESSENTIAL HYPERTENSION: ICD-10-CM

## 2020-06-29 DIAGNOSIS — I49.3 PVC'S (PREMATURE VENTRICULAR CONTRACTIONS): ICD-10-CM

## 2020-06-29 PROCEDURE — 93227 XTRNL ECG REC<48 HR R&I: CPT | Mod: ,,, | Performed by: INTERNAL MEDICINE

## 2020-06-29 PROCEDURE — 93227 HOLTER MONITOR - 48 HOUR (CUPID ONLY): ICD-10-PCS | Mod: ,,, | Performed by: INTERNAL MEDICINE

## 2020-06-29 PROCEDURE — 93226 XTRNL ECG REC<48 HR SCAN A/R: CPT

## 2020-07-01 ENCOUNTER — TELEPHONE (OUTPATIENT)
Dept: RHEUMATOLOGY | Facility: CLINIC | Age: 55
End: 2020-07-01

## 2020-07-01 LAB
OHS CV EVENT MONITOR DAY: 0
OHS CV HOLTER LENGTH DECIMAL HOURS: 48
OHS CV HOLTER LENGTH HOURS: 48
OHS CV HOLTER LENGTH MINUTES: 0

## 2020-07-01 NOTE — TELEPHONE ENCOUNTER
----- Message from Rosy Sanchez sent at 7/1/2020 12:37 PM CDT -----  Contact: Juana from [facility]  Is needing to have medical records sent to them / an authorization was received and is no good due to it reading as if the records need to go back to [facility]. Dr Sims's office needs the records and cam be reached at [phone] fax [phone]//thanks/dbw

## 2020-07-02 ENCOUNTER — TELEPHONE (OUTPATIENT)
Dept: CARDIOLOGY | Facility: CLINIC | Age: 55
End: 2020-07-02

## 2020-07-02 RX ORDER — NEBIVOLOL 10 MG/1
10 TABLET ORAL NIGHTLY
Qty: 60 TABLET | Refills: 3 | Status: SHIPPED | OUTPATIENT
Start: 2020-07-02 | End: 2020-08-12 | Stop reason: SDUPTHER

## 2020-07-02 NOTE — TELEPHONE ENCOUNTER
Spoke with patient gave results of holter patient states understanding.     ----- Message from Jose Antonio Mcdowell MD sent at 7/2/2020  2:29 PM CDT -----  Please call the patient regarding her result. Frequent extra beats - PVCS noted, double Bystolic to 10mg and monitor BP and heart rate and follow up with me soon.

## 2020-08-05 ENCOUNTER — TELEPHONE (OUTPATIENT)
Dept: PULMONOLOGY | Facility: CLINIC | Age: 55
End: 2020-08-05

## 2020-08-05 ENCOUNTER — OFFICE VISIT (OUTPATIENT)
Dept: PULMONOLOGY | Facility: CLINIC | Age: 55
End: 2020-08-05
Payer: COMMERCIAL

## 2020-08-05 VITALS
OXYGEN SATURATION: 98 % | WEIGHT: 154.63 LBS | HEIGHT: 66 IN | BODY MASS INDEX: 24.85 KG/M2 | RESPIRATION RATE: 16 BRPM | DIASTOLIC BLOOD PRESSURE: 64 MMHG | HEART RATE: 51 BPM | SYSTOLIC BLOOD PRESSURE: 110 MMHG

## 2020-08-05 DIAGNOSIS — G47.33 OBSTRUCTIVE SLEEP APNEA SYNDROME: Primary | ICD-10-CM

## 2020-08-05 PROCEDURE — 99999 PR PBB SHADOW E&M-EST. PATIENT-LVL IV: ICD-10-PCS | Mod: PBBFAC,,, | Performed by: INTERNAL MEDICINE

## 2020-08-05 PROCEDURE — 3074F SYST BP LT 130 MM HG: CPT | Mod: CPTII,S$GLB,, | Performed by: INTERNAL MEDICINE

## 2020-08-05 PROCEDURE — 3008F BODY MASS INDEX DOCD: CPT | Mod: CPTII,S$GLB,, | Performed by: INTERNAL MEDICINE

## 2020-08-05 PROCEDURE — 3078F PR MOST RECENT DIASTOLIC BLOOD PRESSURE < 80 MM HG: ICD-10-PCS | Mod: CPTII,S$GLB,, | Performed by: INTERNAL MEDICINE

## 2020-08-05 PROCEDURE — 3008F PR BODY MASS INDEX (BMI) DOCUMENTED: ICD-10-PCS | Mod: CPTII,S$GLB,, | Performed by: INTERNAL MEDICINE

## 2020-08-05 PROCEDURE — 99203 OFFICE O/P NEW LOW 30 MIN: CPT | Mod: S$GLB,,, | Performed by: INTERNAL MEDICINE

## 2020-08-05 PROCEDURE — 99999 PR PBB SHADOW E&M-EST. PATIENT-LVL IV: CPT | Mod: PBBFAC,,, | Performed by: INTERNAL MEDICINE

## 2020-08-05 PROCEDURE — 3078F DIAST BP <80 MM HG: CPT | Mod: CPTII,S$GLB,, | Performed by: INTERNAL MEDICINE

## 2020-08-05 PROCEDURE — 3074F PR MOST RECENT SYSTOLIC BLOOD PRESSURE < 130 MM HG: ICD-10-PCS | Mod: CPTII,S$GLB,, | Performed by: INTERNAL MEDICINE

## 2020-08-05 PROCEDURE — 99203 PR OFFICE/OUTPT VISIT, NEW, LEVL III, 30-44 MIN: ICD-10-PCS | Mod: S$GLB,,, | Performed by: INTERNAL MEDICINE

## 2020-08-05 RX ORDER — NABUMETONE 500 MG/1
500 TABLET, FILM COATED ORAL DAILY PRN
COMMUNITY
Start: 2020-07-09 | End: 2021-01-27

## 2020-08-05 NOTE — PROGRESS NOTES
Subjective:      Patient ID: Kayleigh Frazier is a 55 y.o. female.    Patient Active Problem List   Diagnosis    Essential hypertension    Other specified hypothyroidism    PVC's (premature ventricular contractions)    Obstructive sleep apnea syndrome       Problem list has been reviewed.    Chief Complaint: Sleep Apnea        she has been referred by Jose Antonio Mcdowell MD for evaluation for possible obstructive sleep apnea    HPI:   Sleep Apnea:    She presents for a sleep evaluation.  Patient has observed  frequent awakening, snoring.   Patient reports snoring, decreased memory, decreased concentration, feels sleepy during the day and non restful' sleep. she denies falling asleep while driving    Cardiovascular risk factors: hypertension.     Bed time is 2200. Wake time is 0600;Sleep onset is within  1 Hour;Sleep maintenance difficulties related to frequent night time awakening;  Wake after sleep onset occurs three times a night;Nocturia occurs one time a night; Uses sleep aids : No  Wakes up with a dry mouth : No.    Sleep walking: No;Sleep talking : No;Sleep eating:No;Vivid Dreams : No;Cataplexy : No,   Hypnogogic hallucinations:  No      COMORBIDITIES:  BP Readings from Last 3 Encounters:   08/05/20 110/64   06/22/20 (!) 149/80   06/22/20 (!) 149/70       CARDIAC RISK FACTORS:  hypertension      [place] Questionnaire (validated JORGE screening questionnaire)  Positive -- Snoring/apnea   Positive -- Fatigue    Body mass index is 24.96 kg/m².  (>25 is overweight, >30 is obese)  Blood Pressure = Hypertension    (PreHTN 120-139/80-89, Stg1 140-159/90-99, Stg2 >160/>100)    [place] = three of three JORGE categories are positive (high risk is 2-3 positive categories)     [place] Sleepiness Scale   EPWORTH SLEEPINESS SCALE 8/5/2020   Sitting and reading 1   Watching TV 1   Sitting, inactive in a public place (e.g. a theatre or a meeting) 0   As a passenger in a car for an hour without a break 0   Lying down to rest in  the afternoon when circumstances permit 1   Sitting and talking to someone 0   Sitting quietly after a lunch without alcohol 1   In a car, while stopped for a few minutes in traffic 0   Total score 4       Reference: Rashad TREVINO. A new method for measuring daytime sleepiness: The [place]  Sleepiness Scale. Sleep 1991; 14(6):540-5.    STOP-Bang Questionnaire (validated JORGE screening questionnaire)  Negative unless checked off.  [x] Snoring    [x]  Tired/Fatigued/Sleepy  [] Obstruction (apneas/choking)  [x] Pressure (HTN)  [] BMI >35  [x] Age >50  [] Neck >40 cm  [] Gender male   STOP-Bang = 4 (low risk 0-2,high risk 3-8)    References:   STOP Questionnaire   A Tool to Screen Patients for Obstructive Sleep Apnea: CHANDNI Galvin.C.P.C., JIMENEZ French.B.B.S., Shoshana Calzada M.D.,Chary Kuhn, Ph.D., JIMENEZ Mcwilliams.B.B.S.,_ JIMENEZ Soni.Sc.,_ Joel Ball M.D., Gustavo Ross F.R.C.P.C.; Anesthesiology 2008; 108:812-21 Copyright © 2008, the American Society of Anesthesiologists, Inc. [employer]      Neck circumference 31 cm [?JORGE risk if >43cm (17in) male or >41cm (15.5 in) female]    Sleep position Supine = occasional    Non-supine = occasional  Mouth breathing during sleep - possibly       Occupational History:   for [employer]  Denies occupational exposure to asbestos, silica and petrochemicals.    Avocational Exposures:  none    Pet Exposures:  none    Previous Report Reviewed: lab reports, office notes and radiology reports     The following portions of the patient's history were reviewed and updated as appropriate: She  has a past medical history of Essential hypertension (1/19/2018), Heart murmur, and Obstructive sleep apnea syndrome (8/5/2020).  She  has no past surgical history on file.  Her family history includes Hypertension in her mother.  She  reports that she has never smoked. She has never used smokeless tobacco. She reports current alcohol use.  "She reports that she does not use drugs.  She has a current medication list which includes the following prescription(s): ergocalciferol (vitamin d2), levothyroxine, liothyronine, nabumetone, nebivolol, alahist cf, and montelukast.  She has No Known Allergies..    Review of Systems   Constitutional: Positive for fatigue. Negative for fever, chills and night sweats.   HENT: Positive for rhinorrhea and congestion.    Eyes: Positive for itching.   Respiratory: Positive for snoring. Negative for cough, wheezing and dyspnea on extertion.    Cardiovascular: Negative for chest pain, palpitations and leg swelling.   Genitourinary: Negative for difficulty urinating.   Endocrine: Negative for polydipsia, cold intolerance and heat intolerance.    Musculoskeletal: Positive for arthralgias and back pain.   Gastrointestinal: Positive for acid reflux. Negative for nausea, vomiting and abdominal pain.   Neurological: Negative for dizziness, light-headedness and headaches.   Hematological: Excessive bruising.   Psychiatric/Behavioral: The patient is nervous/anxious.    All other systems reviewed and are negative.     Objective:     Vitals:    08/05/20 1039   BP: 110/64   Pulse: (!) 51   Resp: 16   SpO2: 98%   Weight: 70.1 kg (154 lb 10.4 oz)   Height: 5' 6" (1.676 m)     Body mass index is 24.96 kg/m².    Physical Exam  Vitals signs and nursing note reviewed.   HENT:      Head: Normocephalic and atraumatic.      Nose: Nose normal.      Mouth/Throat:      Mouth: Mucous membranes are dry.   Eyes:      Extraocular Movements: Extraocular movements intact.      Pupils: Pupils are equal, round, and reactive to light.   Neck:      Musculoskeletal: Normal range of motion and neck supple.   Cardiovascular:      Rate and Rhythm: Normal rate and regular rhythm.   Pulmonary:      Effort: Pulmonary effort is normal.      Breath sounds: Normal breath sounds.   Abdominal:      General: Abdomen is flat.      Palpations: Abdomen is soft.   Skin:     " General: Skin is warm and dry.      Capillary Refill: Capillary refill takes less than 2 seconds.   Neurological:      General: No focal deficit present.      Mental Status: She is alert and oriented to person, place, and time.   Psychiatric:         Mood and Affect: Mood normal.         Behavior: Behavior normal.         Personal Diagnostic Review  none pertinent          Chemistry        Component Value Date/Time     06/23/2020 0836    K 3.7 06/23/2020 0836     06/23/2020 0836    CO2 31 (H) 06/23/2020 0836    BUN 20 06/23/2020 0836    CREATININE 0.8 06/23/2020 0836     06/23/2020 0836        Component Value Date/Time    CALCIUM 10.0 06/23/2020 0836    ALKPHOS 105 06/23/2020 0836    AST 21 06/23/2020 0836    ALT 17 06/23/2020 0836    BILITOT 0.3 06/23/2020 0836    ESTGFRAFRICA >60 06/23/2020 0836    EGFRNONAA >60 06/23/2020 0836          Lab Results   Component Value Date    TSH 0.119 (L) 06/23/2020       Lab Results   Component Value Date    WBC 5.58 06/23/2020    HGB 13.0 06/23/2020    HCT 41.0 06/23/2020    MCV 93 06/23/2020     06/23/2020     Assessment /plan       Discussed diagnosis, its evaluation, treatment and usual course. All questions answered.    Problem List Items Addressed This Visit        Other    Obstructive sleep apnea syndrome - Primary    Current Assessment & Plan     My recommendation at this point would be to set up a [facility].  We have discussed weight loss and how this may improve her situation.  We have discussed behavioral modifications, as well.  After her study, she  could certainly try a CPAP.          Relevant Orders    Home Sleep Studies          TIME SPENT WITH PATIENT: Time spent: 30 minutes in face to face  discussion concerning diagnosis, prognosis, review of lab and test results, benefits of treatment as well as management of disease, counseling of patient and coordination of care between various health  care  providers . Greater than half the time spent was used for coordination of care and counseling of patient.     Follow up in about 6 weeks (around 9/16/2020) for JORGE.

## 2020-08-05 NOTE — PATIENT INSTRUCTIONS
Obstructive Sleep Apnea  Obstructive sleep apnea is a condition that causes your air passages to become narrowed or blocked during sleep. As a result, breathing stops for short periods. Your body wakes up enough for breathing to begin again, though you don't remember it. The cycle of stopped breathing and brief awakenings can repeat dozens of times a night. This prevents the body from getting to the deeper stages of sleep that are needed for good rest and may cause your body's oxygen level to fall.  Signs of sleep apnea include loud snoring, noisy breathing, and gasping sounds during sleep. Daytime symptoms include waking up tired after a full night's sleep, waking up with headaches, feeling very sleepy or falling asleep during the day, and having problems with memory or concentration.  Risk factors for sleep apnea include:  · Being overweight  · Being a man, or a woman in menopause  · Smoking  · Using alcohol or sedating medicines  · Having enlarged structures in the nose or throat  Home care  Lifestyle changes that can help treat snoring and sleep apnea include the following:  · If you are overweight, lose weight. Talk to your healthcare provider about a weight-loss plan for you.  · Avoid alcohol for 3 to 4 hours before bedtime. Avoid sedating medications. Ask your healthcare provider about the medicines you take.  · If you smoke, talk to your healthcare provider about ways to quit.  · Sleep on your side. This can help prevent gravity from pulling relaxed throat tissues into your breathing passages.  · If you have allergies or sinus problems that block your nose, ask your healthcare provider for help.  Follow-up care  Follow up with your healthcare provider, or as advised. A diagnosis of sleep apnea is made with a sleep study. Your healthcare provider can tell you more about this test.  When to seek medical advice  Sleep apnea can make you more likely to have certain health problems. These include high blood  pressure, heart attack, stroke, and sexual dysfunction. If you have sleep apnea, talk to your healthcare provider about the best treatments for you.  Date Last Reviewed: 4/1/2017  © 6723-9868 [employer]. [address] [place] [address], [place] [address]. All rights reserved. This information is not intended as a substitute for professional medical care. Always follow your healthcare professional's instructions.

## 2020-08-05 NOTE — LETTER
August 5, 2020      Jose Antonio Mcdowell MD  [address]  [place] [address]           [facility]  [address] [place] [address]  Phone: [phone]  Fax: [phone]          Patient: Kayleigh Frazier   MR Number: [id number]   YOB: 1965   Date of Visit: 8/5/2020       Dear Dr. Jose Antonio Mcdowell:    Thank you for referring Kayleigh Frazier to me for evaluation. Attached you will find relevant portions of my assessment and plan of care.    If you have questions, please do not hesitate to call me. I look forward to following Kayleigh Frazier along with you.    Sincerely,    Joseph Bright MD    Enclosure  CC:  No Recipients    If you would like to receive this communication electronically, please contact [email or web address] or [phone] to request more information on [facility] Link access.    For providers and/or their staff who would like to refer a patient to [facility], please contact us through our one-stop-shop provider referral line, [facility], at [phone].    If you feel you have received this communication in error or would no longer like to receive these types of communications, please e-mail [email or web address]

## 2020-08-06 ENCOUNTER — TELEPHONE (OUTPATIENT)
Dept: ENDOCRINOLOGY | Facility: CLINIC | Age: 55
End: 2020-08-06

## 2020-08-06 NOTE — TELEPHONE ENCOUNTER
Returned phone call to patient notified patient that  no longer has New Patient slot available because she will be leaving [facility] in October. I suggested the pt see  instead, pt declined and stated she will consult with her PCP again.

## 2020-08-12 ENCOUNTER — OFFICE VISIT (OUTPATIENT)
Dept: CARDIOLOGY | Facility: CLINIC | Age: 55
End: 2020-08-12
Payer: COMMERCIAL

## 2020-08-12 VITALS
BODY MASS INDEX: 24.84 KG/M2 | HEIGHT: 66 IN | OXYGEN SATURATION: 99 % | HEART RATE: 57 BPM | WEIGHT: 154.56 LBS | DIASTOLIC BLOOD PRESSURE: 78 MMHG | SYSTOLIC BLOOD PRESSURE: 120 MMHG

## 2020-08-12 DIAGNOSIS — I10 ESSENTIAL HYPERTENSION: ICD-10-CM

## 2020-08-12 DIAGNOSIS — E03.8 OTHER SPECIFIED HYPOTHYROIDISM: ICD-10-CM

## 2020-08-12 DIAGNOSIS — G47.33 OBSTRUCTIVE SLEEP APNEA SYNDROME: ICD-10-CM

## 2020-08-12 DIAGNOSIS — I49.3 PVC'S (PREMATURE VENTRICULAR CONTRACTIONS): Primary | ICD-10-CM

## 2020-08-12 PROCEDURE — 99214 OFFICE O/P EST MOD 30 MIN: CPT | Mod: S$GLB,,, | Performed by: INTERNAL MEDICINE

## 2020-08-12 PROCEDURE — 99999 PR PBB SHADOW E&M-EST. PATIENT-LVL III: CPT | Mod: PBBFAC,,, | Performed by: INTERNAL MEDICINE

## 2020-08-12 PROCEDURE — 3074F SYST BP LT 130 MM HG: CPT | Mod: CPTII,S$GLB,, | Performed by: INTERNAL MEDICINE

## 2020-08-12 PROCEDURE — 3074F PR MOST RECENT SYSTOLIC BLOOD PRESSURE < 130 MM HG: ICD-10-PCS | Mod: CPTII,S$GLB,, | Performed by: INTERNAL MEDICINE

## 2020-08-12 PROCEDURE — 3008F BODY MASS INDEX DOCD: CPT | Mod: CPTII,S$GLB,, | Performed by: INTERNAL MEDICINE

## 2020-08-12 PROCEDURE — 99214 PR OFFICE/OUTPT VISIT, EST, LEVL IV, 30-39 MIN: ICD-10-PCS | Mod: S$GLB,,, | Performed by: INTERNAL MEDICINE

## 2020-08-12 PROCEDURE — 3008F PR BODY MASS INDEX (BMI) DOCUMENTED: ICD-10-PCS | Mod: CPTII,S$GLB,, | Performed by: INTERNAL MEDICINE

## 2020-08-12 PROCEDURE — 3078F DIAST BP <80 MM HG: CPT | Mod: CPTII,S$GLB,, | Performed by: INTERNAL MEDICINE

## 2020-08-12 PROCEDURE — 99999 PR PBB SHADOW E&M-EST. PATIENT-LVL III: ICD-10-PCS | Mod: PBBFAC,,, | Performed by: INTERNAL MEDICINE

## 2020-08-12 PROCEDURE — 3078F PR MOST RECENT DIASTOLIC BLOOD PRESSURE < 80 MM HG: ICD-10-PCS | Mod: CPTII,S$GLB,, | Performed by: INTERNAL MEDICINE

## 2020-08-12 RX ORDER — NEBIVOLOL 5 MG/1
5 TABLET ORAL NIGHTLY
Qty: 30 TABLET | Refills: 3 | Status: SHIPPED | OUTPATIENT
Start: 2020-08-12 | End: 2020-09-04

## 2020-08-12 NOTE — PROGRESS NOTES
Subjective:   Patient ID:  Kayleigh Frazier is a 55 y.o. female who presents for cardiac consult of Essential hypertension      HPI  The patient came in today for cardiac consult of Essential hypertension    PCP - Dr. Ari Sims    Kayleigh Frazier is a 55 y.o. female  HTN, thyroidectomy due to thyroid nodules, hypothyroidism presents for follow up CV eval.     1/19/18  Pt has been having headaches last week. BP was elevated at [facility] 184/88, 169/93, 148/75. PT went to hospital, slipped on ice head her head on concrete, no syncope, likely concussion but was wearing a hat/cap - went to [facility].. She was given Tylenol, BP was 176/75 initialy then 173/70s, then 152/85. Did not have CT head due to normal neuro exam. Pt did cook Yakaz recently with different seasoning.     7/6/18  Pt had 2D ECHO in Jan 2018 which revealed normal BiV function and normal PA pressure 19 mmHg. BP is well controlled. Pt had MRI in Jan, pt has significant allergies and is due to see allergy specialist. PT does have occ headaches which improve with allergy meds.     5/6/20  She switched jobs working at [employer]. She helps with special projects. For the past two weeks has been having headcahes, she has been caring for her mom as well. She is unsure if anxiety causing issues. Last night 162/91 - 132/86, she just took a hot bath and came over after talking with her mother.     6/22/20  Started Coreg after last visit but could not function with it, HR was low. She followed up with PCP and held BB, thyroid meds changed. SHe was started on Bystolic but has knuckle pain, and arm pain. She had workup for inflammation, was abnormal but no RA or further workup.   ECG - NSR, V rate 66 with PVCs    8/12/20  Holter with frequent PVCs - Frequent extra beats - PVCS noted, double Bystolic to 10mg and monitor BP and heart rate and follow up with me soon. Her son had a child. She feels well overall, could not double bystolic due to  low BP. She did not take meds for a few days and felt palpitations. She will have thyroid meds adjusted.     Patient feels no chest pain, no sob, no leg swelling, no PND,  no dizziness, no syncope, no CNS symptoms.    Patient has fairly good exercise tolerance.    Patient is compliant with medications.    1/19/18 ECG - Sinus bradycadia, V rate 48    FH - Grandmother - Stroke in 60s, mult strokes in family    2D ECHO  CONCLUSIONS     1 - No wall motion abnormalities.     2 - Normal left ventricular systolic function (EF 60-65%).     3 - Normal left ventricular diastolic function.     4 - Normal right ventricular systolic function .     5 - The estimated PA systolic pressure is 19 mmHg.     This document has been electronically    SIGNED BY: Beatris Salinas MD On: 01/24/2018 17:35    Past Medical History:   Diagnosis Date    Essential hypertension 1/19/2018    Heart murmur     Obstructive sleep apnea syndrome 8/5/2020       History reviewed. No pertinent surgical history.    Social History     Tobacco Use    Smoking status: Never Smoker    Smokeless tobacco: Never Used   Substance Use Topics    Alcohol use: Yes     Comment: occassionally    Drug use: No       Family History   Problem Relation Age of Onset    Hypertension Mother        Patient's Medications   New Prescriptions    No medications on file   Previous Medications    ALAHIST CF 2-10-20 MG TAB    TK 1 T PO BID PRN    ERGOCALCIFEROL, VITAMIN D2, (VITAMIN D ORAL)    Take 10,000 Units by mouth every 7 days.     LEVOTHYROXINE 125 MCG CAP    Take 125 mcg by mouth once daily.     LIOTHYRONINE (CYTOMEL) 5 MCG TAB    Take 5 mcg by mouth once daily.     MONTELUKAST (SINGULAIR) 10 MG TABLET    TK 1 T PO QD    NABUMETONE (RELAFEN) 500 MG TABLET    TK 1 T PO Q 12 H FOR 2 WEEKS THEN PRF ANOTHER 2 WEEKS   Modified Medications    Modified Medication Previous Medication    NEBIVOLOL (BYSTOLIC) 5 MG TAB nebivoloL (BYSTOLIC) 10 MG Tab       Take 1 tablet (5 mg total) by  "mouth every evening.    Take 1 tablet (10 mg total) by mouth every evening.   Discontinued Medications    No medications on file       Review of Systems   Constitutional: Negative.    HENT: Negative.    Eyes: Negative.    Respiratory: Negative.    Cardiovascular: Positive for palpitations.   Gastrointestinal: Negative.    Genitourinary: Negative.    Musculoskeletal: Positive for joint pain.   Skin: Negative.    Neurological: Negative.    Endo/Heme/Allergies: Negative.    Psychiatric/Behavioral: The patient is nervous/anxious.    All 12 systems otherwise negative.      Wt Readings from Last 3 Encounters:   08/12/20 70.1 kg (154 lb 8.7 oz)   08/05/20 70.1 kg (154 lb 10.4 oz)   06/23/20 70.6 kg (155 lb 10.3 oz)     Temp Readings from Last 3 Encounters:   No data found for Temp     BP Readings from Last 3 Encounters:   08/12/20 120/78   08/05/20 110/64   06/22/20 (!) 149/80     Pulse Readings from Last 3 Encounters:   08/12/20 (!) 57   08/05/20 (!) 51   06/22/20 (!) 53       /78 (BP Location: Right arm, Patient Position: Sitting, BP Method: Medium (Manual))   Pulse (!) 57   Ht 5' 6" (1.676 m)   Wt 70.1 kg (154 lb 8.7 oz)   SpO2 99%   BMI 24.94 kg/m²      Objective:   Physical Exam   Constitutional: She is oriented to person, place, and time. She appears well-developed and well-nourished. No distress.   HENT:   Head: Normocephalic and atraumatic.   Nose: Nose normal.   Mouth/Throat: Oropharynx is clear and moist. No oropharyngeal exudate.   Eyes: Conjunctivae and EOM are normal. Right eye exhibits no discharge. Left eye exhibits no discharge. No scleral icterus.   Neck: Normal range of motion. Neck supple. No JVD present. No thyromegaly present.   Cardiovascular: Normal rate, regular rhythm, S1 normal and S2 normal. Exam reveals no gallop, no S3, no S4 and no friction rub.   No murmur heard.  Pulmonary/Chest: Effort normal and breath sounds normal. No stridor. No respiratory distress. She has no wheezes. She " has no rales. She exhibits no tenderness.   Abdominal: Soft. Bowel sounds are normal. She exhibits no distension and no mass. There is no abdominal tenderness. There is no rebound.   Genitourinary:    Genitourinary Comments: Deferred     Musculoskeletal: Normal range of motion.         General: No tenderness, deformity or edema.   Lymphadenopathy:     She has no cervical adenopathy.   Neurological: She is alert and oriented to person, place, and time. She exhibits normal muscle tone. Coordination normal.   Skin: Skin is warm and dry. No rash noted. She is not diaphoretic. No erythema. No pallor.   Psychiatric: She has a normal mood and affect. Her behavior is normal. Judgment and thought content normal.   Nursing note and vitals reviewed.      Lab Results   Component Value Date     06/23/2020    K 3.7 06/23/2020     06/23/2020    CO2 31 (H) 06/23/2020    BUN 20 06/23/2020    CREATININE 0.8 06/23/2020     06/23/2020    AST 21 06/23/2020    ALT 17 06/23/2020    ALBUMIN 4.4 06/23/2020    PROT 8.7 (H) 06/23/2020    BILITOT 0.3 06/23/2020    WBC 5.58 06/23/2020    HGB 13.0 06/23/2020    HCT 41.0 06/23/2020    MCV 93 06/23/2020     06/23/2020    TSH 0.119 (L) 06/23/2020     Assessment:      1. PVC's (premature ventricular contractions)    2. Essential hypertension    3. Other specified hypothyroidism    4. Obstructive sleep apnea syndrome        Plan:   1. HTN with palpitations, PVCS noted  - cont BB  - discussed low salt diet  - cont diet/exercise  - r/o JORGE  - on coreg and Norvasc in past    2. Bradycardia, sinus   - cont to monitor  - asymptomatic     3. Hypothyroidism  - cont meds per PCP - recently increased dose to 125  - will have further labs soon - will f/u Endo    4. Joint pain with arm swelling  - refer to rheum  - order arm ultrasounds - arterial and venous    Thank you for allowing me to participate in this patient's care. Please do not hesitate to contact me with any questions or  concerns. Consult note has been forwarded to the referral physician.

## 2020-08-20 PROCEDURE — 95800 PR SLEEP STUDY, UNATTENDED, RECORD HEART RATE/O2 SAT/RESP ANAL/SLEEP TIME: ICD-10-PCS | Mod: 26,,, | Performed by: INTERNAL MEDICINE

## 2020-08-20 PROCEDURE — 95800 SLP STDY UNATTENDED: CPT | Mod: 26,,, | Performed by: INTERNAL MEDICINE

## 2020-08-24 ENCOUNTER — TELEPHONE (OUTPATIENT)
Dept: RHEUMATOLOGY | Facility: CLINIC | Age: 55
End: 2020-08-24

## 2020-08-26 ENCOUNTER — PROCEDURE VISIT (OUTPATIENT)
Dept: SLEEP MEDICINE | Facility: CLINIC | Age: 55
End: 2020-08-26
Payer: COMMERCIAL

## 2020-08-26 DIAGNOSIS — G47.33 OBSTRUCTIVE SLEEP APNEA SYNDROME: ICD-10-CM

## 2020-08-26 NOTE — Clinical Note
PHYSICIAN INTERPRETATION AND COMMENTS: Findings are consistent with VERY MILD, positional obstructive sleep  apnea (JORGE) only by RDI criteria. Overal AHI was 3.0/hr . There is insufficient non-supine study time to assess the severity of  non-positional obstructive sleep apnea (JORGE). Consider repeat testing if suspicion high for obstructive sleep apnea.  CLINICAL HISTORY: 55 year old female presented with: 13.5 inch neck, BMI of 24, an [place] sleepiness score of 2, history  of hypertension, depression and symptoms of nocturnal snoring and waking up choking. Based on the clinical history, the patient has  a high pre-test probability of having mild JORGE. STOP BANG score 4.

## 2020-08-28 ENCOUNTER — TELEPHONE (OUTPATIENT)
Dept: PULMONOLOGY | Facility: CLINIC | Age: 55
End: 2020-08-28

## 2020-08-28 DIAGNOSIS — G47.33 OBSTRUCTIVE SLEEP APNEA SYNDROME: Primary | ICD-10-CM

## 2020-08-28 NOTE — PROCEDURES
Home Sleep Studies    Date/Time: 8/26/2020 8:00 AM  Performed by: Rosalio Garcia MD  Authorized by: Joseph Bright MD       PHYSICIAN INTERPRETATION AND COMMENTS: Findings are consistent with VERY MILD, positional obstructive sleep  apnea (JORGE) only by RDI criteria. Overal AHI was 3.0/hr . There is insufficient non-supine study time to assess the severity of  non-positional obstructive sleep apnea (JORGE). Consider repeat testing if suspicion high for obstructive sleep apnea.  CLINICAL HISTORY: 55 year old female presented with: 13.5 inch neck, BMI of 24, an [place] sleepiness score of 2, history  of hypertension, depression and symptoms of nocturnal snoring and waking up choking. Based on the clinical history, the patient has  a high pre-test probability of having mild JORGE. STOP BANG score 4.

## 2020-08-28 NOTE — TELEPHONE ENCOUNTER
Home sleep study Results reviewed:       Findings are consistent with VERY MILD, positional obstructive sleep  apnea (JORGE) only by RDI criteria. Overal AHI was 3.0/hr . There is insufficient non-supine study time to assess the severity of non-positional obstructive sleep apnea (JORGE). Consider repeat testing if suspicion high for obstructive sleep apnea    Plan: Repeat sleep study using 3 night protocol.    Ordered Please inform pateint

## 2020-08-31 NOTE — TELEPHONE ENCOUNTER
Patient informed of home sleep study results and the need for 3 night protocol   All questions answered and understanding verbalized

## 2020-09-04 ENCOUNTER — OFFICE VISIT (OUTPATIENT)
Dept: RHEUMATOLOGY | Facility: CLINIC | Age: 55
End: 2020-09-04
Payer: COMMERCIAL

## 2020-09-04 VITALS
WEIGHT: 157.63 LBS | SYSTOLIC BLOOD PRESSURE: 157 MMHG | HEIGHT: 66 IN | BODY MASS INDEX: 25.33 KG/M2 | HEART RATE: 59 BPM | DIASTOLIC BLOOD PRESSURE: 88 MMHG

## 2020-09-04 DIAGNOSIS — R76.8 POSITIVE ANA (ANTINUCLEAR ANTIBODY): Primary | ICD-10-CM

## 2020-09-04 DIAGNOSIS — Z71.89 COUNSELING ON HEALTH PROMOTION AND DISEASE PREVENTION: ICD-10-CM

## 2020-09-04 DIAGNOSIS — M67.90 TENDINOPATHY: ICD-10-CM

## 2020-09-04 PROCEDURE — 3077F SYST BP >= 140 MM HG: CPT | Mod: CPTII,S$GLB,, | Performed by: INTERNAL MEDICINE

## 2020-09-04 PROCEDURE — 99999 PR PBB SHADOW E&M-EST. PATIENT-LVL III: CPT | Mod: PBBFAC,,, | Performed by: INTERNAL MEDICINE

## 2020-09-04 PROCEDURE — 99214 OFFICE O/P EST MOD 30 MIN: CPT | Mod: S$GLB,,, | Performed by: INTERNAL MEDICINE

## 2020-09-04 PROCEDURE — 3077F PR MOST RECENT SYSTOLIC BLOOD PRESSURE >= 140 MM HG: ICD-10-PCS | Mod: CPTII,S$GLB,, | Performed by: INTERNAL MEDICINE

## 2020-09-04 PROCEDURE — 99214 PR OFFICE/OUTPT VISIT, EST, LEVL IV, 30-39 MIN: ICD-10-PCS | Mod: S$GLB,,, | Performed by: INTERNAL MEDICINE

## 2020-09-04 PROCEDURE — 99999 PR PBB SHADOW E&M-EST. PATIENT-LVL III: ICD-10-PCS | Mod: PBBFAC,,, | Performed by: INTERNAL MEDICINE

## 2020-09-04 PROCEDURE — 3008F PR BODY MASS INDEX (BMI) DOCUMENTED: ICD-10-PCS | Mod: CPTII,S$GLB,, | Performed by: INTERNAL MEDICINE

## 2020-09-04 PROCEDURE — 3008F BODY MASS INDEX DOCD: CPT | Mod: CPTII,S$GLB,, | Performed by: INTERNAL MEDICINE

## 2020-09-04 PROCEDURE — 3079F DIAST BP 80-89 MM HG: CPT | Mod: CPTII,S$GLB,, | Performed by: INTERNAL MEDICINE

## 2020-09-04 PROCEDURE — 3079F PR MOST RECENT DIASTOLIC BLOOD PRESSURE 80-89 MM HG: ICD-10-PCS | Mod: CPTII,S$GLB,, | Performed by: INTERNAL MEDICINE

## 2020-09-04 NOTE — PATIENT INSTRUCTIONS
Nabumetone tablets  What is this medicine?  NABUMETONE (na BYOO me tone) is a non-steroidal anti-inflammatory drug (NSAID). It is used to reduce swelling and to treat pain. It is used for osteoarthritis or rheumatoid arthritis.  How should I use this medicine?  Take this medicine by mouth with a full glass of water. Follow the directions on the prescription label. You can take it with or without food. If it upsets your stomach, take it with food. Try to not lie down for at least 10 minutes after you take this medicine. Take your medicine at regular intervals. Do not take your medicine more often than directed. Long term, continuous use may increase the risk of heart attack or stroke.  A special MedGuide will be given to you by the pharmacist with each prescription and refill. Be sure to read this information carefully each time.  Talk to your pediatrician regarding the use of this medicine in children. Special care may be needed.  What side effects may I notice from receiving this medicine?  Side effects that you should report to your doctor or health care professional as soon as possible:  · black or bloody stools, blood in the urine or vomit  · blurred vision  · chest pain  · difficulty breathing or wheezing  · nausea or vomiting  · skin rash, skin redness, blistering or peeling skin, hives, or itching  · slurred speech or weakness on one side of the body  · severe stomach pain  · swelling of eyelids, throat, lips  · unexplained weight gain or swelling  · unusually weak or tired  · yellowing of eyes or skin  Side effects that usually do not require medical attention (report to your doctor or health care professional if they continue or are bothersome):  · constipation or diarrhea  · gas or heartburn  What may interact with this medicine?  · alcohol  · aspirin  · cidofovir  · diuretics  · lithium  · medicines for high blood pressure  · methotrexate  · other drugs for inflammation like ketorolac, ibuprofen, and  prednisone  · pemetrexed  · warfarin  What if I miss a dose?  If you miss a dose, take it as soon as you can. If it is almost time for your next dose, take only that dose. Do not take double or extra doses.  Where should I keep my medicine?  Keep out of the reach of children.  Store at room temperature between 15 and 30 degrees C (59 and 86 degrees F). Keep container tightly closed. Throw away any unused medicine after the expiration date.  What should I tell my health care provider before I take this medicine?  They need to know if you have any of these conditions:  · asthma  · cigarette smoker  · drink more than 3 alcohol containing drinks a day  · heart disease or circulation problems such as heart failure or leg edema (fluid retention)  · hemophilia or bleeding problems  · high blood pressure  · kidney disease  · liver disease  · stomach bleeding or ulcers  · an unusual or allergic reaction to nabumetone, aspirin, other NSAIDs, other medicines, foods, dyes, or preservatives  · pregnant or trying to get pregnant  · breast-feeding  What should I watch for while using this medicine?  Tell your doctor or health care professional if your pain does not get better. Talk to your doctor before taking another medicine for pain. Do not treat yourself.  This medicine does not prevent heart attack or stroke. In fact, this medicine may increase the chance of a heart attack or stroke. The chance may increase with longer use of this medicine and in people who have heart disease. If you take aspirin to prevent heart attack or stroke, talk with your doctor or health care professional.  Do not take medicines such as ibuprofen and naproxen with this medicine. Side effects such as stomach upset, nausea, or ulcers may be more likely to occur. Many medicines available without a prescription should not be taken with this medicine.  This medicine can cause ulcers and bleeding in the stomach and intestines at any time during treatment.  Do not smoke cigarettes or drink alcohol. These increase irritation to your stomach and can make it more susceptible to damage from this medicine. Ulcers and bleeding can happen without warning symptoms and can cause death.  You may get drowsy or dizzy. Do not drive, use machinery, or do anything that needs mental alertness until you know how this medicine affects you. Do not stand or sit up quickly, especially if you are an older patient. This reduces the risk of dizzy or fainting spells.  This medicine can cause you to bleed more easily. Try to avoid damage to your teeth and gums when you brush or floss your teeth.  NOTE:This sheet is a summary. It may not cover all possible information. If you have questions about this medicine, talk to your doctor, pharmacist, or health care provider. Copyright© 2017 [employer]        Diclofenac skin gel  What is this medicine?  DICLOFENAC (dye KLOE fen ak) is a non-steroidal anti-inflammatory drug (NSAID). The 1% skin gel is used to treat osteoarthritis of the hands or knees. The 3% skin gel is used to treat actinic keratosis.  How should I use this medicine?  This medicine is for external use only. Follow the directions on the prescription label. Wash hands before and after use. Do not get this medicine in your eyes. If you do, rinse out with plenty of cool tap water. Use your doses at regular intervals. Do not use your medicine more often than directed.  A special MedGuide will be given to you by the pharmacist with each prescription and refill of the 1% gel. Be sure to read this information carefully each time.  Talk to your pediatrician regarding the use of this medicine in children. Special care may be needed. The 3% gel is not approved for use in children.  What side effects may I notice from receiving this medicine?  Side effects that you should report to your doctor or health care professional as soon as possible:  · allergic reactions like skin rash, itching or  hives, swelling of the face, lips, or tongue  · black or bloody stools, blood in the urine or vomit  · blurred vision  · chest pain  · difficulty breathing or wheezing  · nausea or vomiting  · redness, blistering, peeling or loosening of the skin, including inside the mouth  · slurred speech or weakness on one side of the body  · trouble passing urine or change in the amount of urine  · unexplained weight gain or swelling  · unusually weak or tired  · yellowing of eyes or skin  Side effects that usually do not require medical attention (report to your doctor or health care professional if they continue or are bothersome):  · dizziness  · dry skin  · headache  · heartburn  · increased sensitivity to the sun  · stomach pain  · tingling at the application site  What may interact with this medicine?  · aspirin  · NSAIDs, medicines for pain and inflammation, like ibuprofen or naproxen  Do not use any other skin products without telling your doctor or health care professional.  What if I miss a dose?  If you miss a dose, use it as soon as you can. If it is almost time for your next dose, use only that dose. Do not use double or extra doses.  Where should I keep my medicine?  Keep out of the reach of children.  Store the 1% gel at room temperature between 15 and 30 degrees C (59 and 86 degrees F). Store the 3% gel at room temperature between 20 and 25 degrees C (68 and 77 degrees F). Protect from light. Throw away any unused medicine after the expiration date.  What should I tell my health care provider before I take this medicine?  They need to know if you have any of these conditions:  · asthma  · bleeding problems  · coronary artery bypass graft (CABG) surgery within the past 2 weeks  · heart disease  · high blood pressure  · if you frequently drink alcohol containing drinks  · kidney disease  · liver disease  · open or infected skin  · stomach problems  · an unusual or allergic reaction to diclofenac, aspirin, other  NSAIDs, other medicines, benzyl alcohol (3% gel only), foods, dyes, or preservatives  · pregnant or trying to get pregnant  · breast-feeding  What should I watch for while using this medicine?  Tell your doctor or healthcare professional if your symptoms do not start to get better or if they get worse. You will need to follow up with your health care provider to monitor your progress. You may need to be treated for up to 3 months if you are using the 3% gel, but the full effect may not occur until 1 month after stopping treatment. If you develop a severe skin reaction, contact your doctor or health care professional immediately.  This medicine can make you more sensitive to the sun. Keep out of the sun. If you cannot avoid being in the sun, wear protective clothing and use sunscreen. Do not use sun lamps or tanning beds/booths.  Do not take medicines such as ibuprofen and naproxen with this medicine. Side effects such as stomach upset, nausea, or ulcers may be more likely to occur. Many medicines available without a prescription should not be taken with this medicine.  This medicine does not prevent heart attack or stroke. In fact, this medicine may increase the chance of a heart attack or stroke. The chance may increase with longer use of this medicine and in people who have heart disease. If you take aspirin to prevent heart attack or stroke, talk with your doctor or health care professional.  This medicine can cause ulcers and bleeding in the stomach and intestines at any time during treatment. Do not smoke cigarettes or drink alcohol. These increase irritation to your stomach and can make it more susceptible to damage from this medicine. Ulcers and bleeding can happen without warning symptoms and can cause death.  You may get drowsy or dizzy. Do not drive, use machinery, or do anything that needs mental alertness until you know how this medicine affects you. Do not stand or sit up quickly, especially if you are  an older patient. This reduces the risk of dizzy or fainting spells.  This medicine can cause you to bleed more easily. Try to avoid damage to your teeth and gums when you brush or floss your teeth.  NOTE:This sheet is a summary. It may not cover all possible information. If you have questions about this medicine, talk to your doctor, pharmacist, or health care provider. Copyright© 2017 [employer]

## 2020-09-04 NOTE — PROGRESS NOTES
RHEUMATOLOGY OUTPATIENT CLINIC NOTE    9/4/2020    Attending Rheumatologist: Leighton Junior  Primary Care Provider: Ari Sims MD   Physician Requesting Consultation: No referring provider defined for this encounter.  Chief Complaint/Reason For Consultation:  Arthalgia    Subjective:       HPI  Kayleigh Frazier is a 55 y.o. Black or  female with generalized arthralgias and elevated BRINA comes for follow-up.    Today  Last seen on June.  Recommended rheumatic workup and provided recommendations for mechanical pain.  No acute complaints.  Refers improvement of hand arthralgias since last visit.  Denies any active rash or photosensitivity.  Does not have tri-color discoloration of fingertips upon cold exposure or ulcers.  Denies persisent paresthesias, fever, or hematuria.    Review of Systems   Constitutional: Negative for chills, fever and malaise/fatigue.   Eyes: Negative for pain and redness.   Respiratory: Negative for cough, hemoptysis and shortness of breath.    Cardiovascular: Negative for chest pain and leg swelling.   Gastrointestinal: Negative for abdominal pain, blood in stool and melena.   Genitourinary: Negative for dysuria and hematuria.   Musculoskeletal: Negative for falls and joint pain (Episodic hand arthralgias with mixed pattern and neuropathic features.).   Skin: Negative for rash.   Neurological: Negative for tingling and focal weakness.   Psychiatric/Behavioral: Negative for memory loss. The patient does not have insomnia.      Chronic comorbid conditions affecting medical decision making today:  Past Medical History:   Diagnosis Date    Essential hypertension 1/19/2018    Heart murmur     Obstructive sleep apnea syndrome 8/5/2020   · Thyroid disease  · Allergic rhinitis  · JORGE  · Fibromyalgia    History reviewed. No pertinent surgical history.  Family History   Problem Relation Age of Onset    Hypertension Mother      Social History     Substance and Sexual  "Activity   Alcohol Use Yes    Comment: occassionally     Social History     Tobacco Use   Smoking Status Never Smoker   Smokeless Tobacco Never Used     Social History     Substance and Sexual Activity   Drug Use No       Current Outpatient Medications:     ERGOCALCIFEROL, VITAMIN D2, (VITAMIN D ORAL), Take 10,000 Units by mouth every 7 days. , Disp: , Rfl:     levothyroxine 125 mcg Cap, Take 100 mcg by mouth once daily. , Disp: , Rfl:     liothyronine (CYTOMEL) 5 MCG Tab, Take 5 mcg by mouth once daily. , Disp: , Rfl:     montelukast (SINGULAIR) 10 mg tablet, TK 1 T PO QD, Disp: , Rfl:     nabumetone (RELAFEN) 500 MG tablet, Take 500 mg by mouth daily as needed. , Disp: , Rfl:     ALAHIST CF 2-10-20 mg Tab, TK 1 T PO BID PRN, Disp: , Rfl:     nebivoloL (BYSTOLIC) 5 MG Tab, Take 1 tablet (5 mg total) by mouth every evening., Disp: 30 tablet, Rfl: 3     Objective:         Vitals:    09/04/20 1348   BP: (!) 157/88   Pulse: (!) 59     Physical Exam   Constitutional: No distress.   Estimated body mass index is 25.44 kg/m² as calculated from the following:    Height as of this encounter: 5' 6" (1.676 m).    Weight as of this encounter: 71.5 kg (157 lb 10.1 oz).    Wt Readings from Last 1 Encounters:  09/04/20 1348 : 71.5 kg (157 lb 10.1 oz)     HENT:   Head: Normocephalic and atraumatic.   Eyes: Conjunctivae are normal. Pupils are equal, round, and reactive to light.   Neck: Normal range of motion.   Cardiovascular: Normal rate and intact distal pulses.    Pulmonary/Chest: Effort normal. No respiratory distress.   Abdominal: Soft. She exhibits no distension.   Neurological: She is alert. Gait normal.   Skin: No rash noted. No erythema.     Musculoskeletal: Normal range of motion.      Comments: : strong  No synovitis or significant squeeze tenderness    AROM: intact  PROM: intact    Devices used by patient: none       Reviewed old and all outside pertinent medical records available.    All lab results " personally reviewed and interpreted by me.  Lab Results   Component Value Date    WBC 5.58 06/23/2020    HGB 13.0 06/23/2020    HCT 41.0 06/23/2020    MCV 93 06/23/2020    MCH 29.6 06/23/2020    MCHC 31.7 (L) 06/23/2020    RDW 13.4 06/23/2020     06/23/2020    MPV 10.4 06/23/2020       Lab Results   Component Value Date     06/23/2020    K 3.7 06/23/2020     06/23/2020    CO2 31 (H) 06/23/2020     06/23/2020    BUN 20 06/23/2020    CALCIUM 10.0 06/23/2020    PROT 8.7 (H) 06/23/2020    ALBUMIN 4.4 06/23/2020    BILITOT 0.3 06/23/2020    AST 21 06/23/2020    ALKPHOS 105 06/23/2020    ALT 17 06/23/2020       Lab Results   Component Value Date    COLORU Yellow 06/23/2020    APPEARANCEUA Clear 06/23/2020    SPECGRAV 1.020 06/23/2020    PHUR 6.0 06/23/2020    PROTEINUA Negative 06/23/2020    KETONESU Negative 06/23/2020    LEUKOCYTESUR Negative 06/23/2020    NITRITE Negative 06/23/2020       Lab Results   Component Value Date    CRP 2.9 06/23/2020       Lab Results   Component Value Date    SEDRATE 24 06/23/2020       Lab Results   Component Value Date    RF <10.0 06/23/2020    SEDRATE 24 06/23/2020       No components found for: 25OHVITDTOT, 16DCEMES5, 89PCFRGX1, METHODNOTE    No results found for: URICACID    No components found for: TSPOTTB    Rheum Labs:   BRINA 1 in 80 homogeneous   GINNA negative   Rheumatoid factor negative.  CCP negative.    Infectious Labs:   Hepatitis profile nonreactive June 2000     Imaging:  All imaging reviewed and independently  interpreted by me.    X-ray hips June 2018   Both femoral heads are well seated within acetabula.  No significant joint space narrowing identified.  No plain film evidence to suggest AVN of either hip.  Multiple phleboliths noted within the pelvis.  There also some phleboliths seen overlying the left psoas margin.    MRI lumbar spine June 2018  L4-L5 and L5-S1 bilateral neural foraminal encroachment.  Findings are most pronounced at L5-S1  on the left.  Degenerative changes, left renal cysts.  The sacroiliac joints are intact and symmetric in appearance.    X-ray hand June 2020  No acute or healing fracture.  Bone mineralization within normal limits.  No significant soft tissue swelling, calcification or foreign body.  No discrete erosion or periosteal reaction.     ASSESSMENT / PLAN:     Kayleigh Frazier is a 55 y.o. Black or  female with:    1. Positive BRINA  - chronic episodic hand arthralgias with mechanical pattern and neuropathic features.    - Rest of rheumatic workup unrevealing.  History of thyroid disorder, supplementation recently adjusted  -recommended to discontinue NSAID use for DJD and limit use to only p.r.n. no more than 2 weeks of the month.  - recommended to reconsider gabapentin trial for recurrent/refractory arthralgias.    2. Other specified counseling  - Immunization counseling done.  - Nutrition and exercise counseling.  - Limitation of alcohol consumption.  - Regular exercise:  Aerobic and resistance.  - Medication counseling provided.    Follow up in about 6 months (around 3/4/2021).    Method of contact with patient concerns: [facility]    Disclaimer:  This note is prepared using voice recognition software and as such is likely to have errors and has not been proof read. Please contact me for questions.     Time spent: 25 minutes in face to face discussion concerning diagnosis, prognosis, review of lab and test results, benefits of treatment as well as management of disease, counseling of patient and coordination of care between various health care providers.  Greater than half the time spent was used for coordination of care and counseling of patient.    Leighton Junior M.D.  Rheumatology Department   [facility] [place]

## 2020-09-08 ENCOUNTER — PROCEDURE VISIT (OUTPATIENT)
Dept: SLEEP MEDICINE | Facility: CLINIC | Age: 55
End: 2020-09-08
Payer: COMMERCIAL

## 2020-09-08 DIAGNOSIS — G47.33 OBSTRUCTIVE SLEEP APNEA SYNDROME: Primary | ICD-10-CM

## 2020-09-08 PROCEDURE — 95800 SLP STDY UNATTENDED: CPT

## 2020-09-08 PROCEDURE — 95800 PR SLEEP STUDY, UNATTENDED, RECORD HEART RATE/O2 SAT/RESP ANAL/SLEEP TIME: ICD-10-PCS | Mod: 26,,, | Performed by: INTERNAL MEDICINE

## 2020-09-08 PROCEDURE — 95800 SLP STDY UNATTENDED: CPT | Mod: 26,,, | Performed by: INTERNAL MEDICINE

## 2020-09-08 NOTE — Clinical Note
PHYSICIAN INTERPRETATION AND COMMENTS: Findings are consistent with mild, non-positional obstructive sleep  apnea (JORGE) only by RDI criteria. Best study was night #3: AHI 5.0/hr with 4.2 hours sleep. Mild snoring. SpO2 ellis was 87.2%.  Interventions for snoring may be considered along ENT evaluation.  CLINICAL HISTORY: 55 year old female presented with: 14 inch neck, BMI of 25, an [place] sleepiness score of 16, history of  hypertension, depression and symptoms of nocturnal snoring. Based on the clinical history, the patient has a high pre-test probability  of having mild JORGE. STOP BANG score 4.

## 2020-09-08 NOTE — PROCEDURES
Home Sleep Studies    Date/Time: 9/8/2020 8:00 AM  Performed by: Rosalio Garcia MD  Authorized by: Joseph Bright MD       PHYSICIAN INTERPRETATION AND COMMENTS: Findings are consistent with mild, non-positional obstructive sleep  apnea (JORGE) only by RDI criteria. Best study was night #3: AHI 5.0/hr with 4.2 hours sleep. Mild snoring. SpO2 ellis was 87.2%.  Interventions for snoring may be considered along ENT evaluation.  CLINICAL HISTORY: 55 year old female presented with: 14 inch neck, BMI of 25, an [place] sleepiness score of 16, history of  hypertension, depression and symptoms of nocturnal snoring. Based on the clinical history, the patient has a high pre-test probability  of having mild JORGE. STOP BANG score 4.

## 2020-09-09 ENCOUNTER — TELEPHONE (OUTPATIENT)
Dept: PULMONOLOGY | Facility: CLINIC | Age: 55
End: 2020-09-09

## 2020-09-09 DIAGNOSIS — G47.33 OBSTRUCTIVE SLEEP APNEA SYNDROME: Primary | ICD-10-CM

## 2020-09-09 NOTE — TELEPHONE ENCOUNTER
Conveyed results of 3 night home sleep study appointment scheduled for 1st download on 11/5/2020. All questions answered. Patient verbalized understanding

## 2020-09-09 NOTE — TELEPHONE ENCOUNTER
Home sleep study Results reviewed:     Findings are consistent with mild, non-positional obstructive sleep  apnea (JORGE) only by RDI criteria. Best study was night #3: AHI 5.0/hr with 4.2 hours sleep. Mild snoring. SpO2 ellis was 87.2%.    Assessment:   MILD JORGE    Plan:  Start  AUTOPAP of  4 - 20  CMWP.  Schedule  6 weeks follow up for complaince evaluation.     Ordered Please inform pateint

## 2020-10-19 ENCOUNTER — TELEPHONE (OUTPATIENT)
Dept: PULMONOLOGY | Facility: CLINIC | Age: 55
End: 2020-10-19

## 2020-10-19 DIAGNOSIS — G47.33 OBSTRUCTIVE SLEEP APNEA SYNDROME: Primary | ICD-10-CM

## 2020-10-19 NOTE — TELEPHONE ENCOUNTER
----- Message from Dariela Fernando sent at 10/19/2020 10:18 AM CDT -----  Contact: pt  Pt requesting a call back regarding her cpap. She states that she is needing a prescription/order for just the nose portion of the mask. The nose/mouth one she has is not working for her. Please call pt back at [phone]

## 2020-11-13 ENCOUNTER — OFFICE VISIT (OUTPATIENT)
Dept: CARDIOLOGY | Facility: CLINIC | Age: 55
End: 2020-11-13
Payer: COMMERCIAL

## 2020-11-13 VITALS
WEIGHT: 161.81 LBS | DIASTOLIC BLOOD PRESSURE: 78 MMHG | OXYGEN SATURATION: 99 % | RESPIRATION RATE: 16 BRPM | BODY MASS INDEX: 26.01 KG/M2 | HEART RATE: 57 BPM | SYSTOLIC BLOOD PRESSURE: 124 MMHG | HEIGHT: 66 IN

## 2020-11-13 DIAGNOSIS — E03.8 OTHER SPECIFIED HYPOTHYROIDISM: ICD-10-CM

## 2020-11-13 DIAGNOSIS — G47.33 OBSTRUCTIVE SLEEP APNEA SYNDROME: ICD-10-CM

## 2020-11-13 DIAGNOSIS — I10 ESSENTIAL HYPERTENSION: ICD-10-CM

## 2020-11-13 DIAGNOSIS — I49.3 PVC'S (PREMATURE VENTRICULAR CONTRACTIONS): Primary | ICD-10-CM

## 2020-11-13 PROCEDURE — 99214 OFFICE O/P EST MOD 30 MIN: CPT | Mod: S$GLB,,, | Performed by: INTERNAL MEDICINE

## 2020-11-13 PROCEDURE — 3008F PR BODY MASS INDEX (BMI) DOCUMENTED: ICD-10-PCS | Mod: CPTII,S$GLB,, | Performed by: INTERNAL MEDICINE

## 2020-11-13 PROCEDURE — 99999 PR PBB SHADOW E&M-EST. PATIENT-LVL III: ICD-10-PCS | Mod: PBBFAC,,, | Performed by: INTERNAL MEDICINE

## 2020-11-13 PROCEDURE — 3074F PR MOST RECENT SYSTOLIC BLOOD PRESSURE < 130 MM HG: ICD-10-PCS | Mod: CPTII,S$GLB,, | Performed by: INTERNAL MEDICINE

## 2020-11-13 PROCEDURE — 3078F DIAST BP <80 MM HG: CPT | Mod: CPTII,S$GLB,, | Performed by: INTERNAL MEDICINE

## 2020-11-13 PROCEDURE — 99999 PR PBB SHADOW E&M-EST. PATIENT-LVL III: CPT | Mod: PBBFAC,,, | Performed by: INTERNAL MEDICINE

## 2020-11-13 PROCEDURE — 99214 PR OFFICE/OUTPT VISIT, EST, LEVL IV, 30-39 MIN: ICD-10-PCS | Mod: S$GLB,,, | Performed by: INTERNAL MEDICINE

## 2020-11-13 PROCEDURE — 3008F BODY MASS INDEX DOCD: CPT | Mod: CPTII,S$GLB,, | Performed by: INTERNAL MEDICINE

## 2020-11-13 PROCEDURE — 3074F SYST BP LT 130 MM HG: CPT | Mod: CPTII,S$GLB,, | Performed by: INTERNAL MEDICINE

## 2020-11-13 PROCEDURE — 3078F PR MOST RECENT DIASTOLIC BLOOD PRESSURE < 80 MM HG: ICD-10-PCS | Mod: CPTII,S$GLB,, | Performed by: INTERNAL MEDICINE

## 2020-11-13 RX ORDER — HYDROCHLOROTHIAZIDE 12.5 MG/1
12.5 TABLET ORAL DAILY PRN
Qty: 30 TABLET | Refills: 3 | Status: SHIPPED | OUTPATIENT
Start: 2020-11-13 | End: 2021-12-01 | Stop reason: SDUPTHER

## 2020-11-13 RX ORDER — LINACLOTIDE 145 UG/1
CAPSULE, GELATIN COATED ORAL
COMMUNITY
Start: 2020-10-03

## 2020-11-13 RX ORDER — CHOLECALCIFEROL (VITAMIN D3) 25 MCG
1000 TABLET ORAL DAILY
COMMUNITY
End: 2021-01-27 | Stop reason: SDUPTHER

## 2020-11-13 NOTE — PROGRESS NOTES
Subjective:   Patient ID:  Kayleigh Frazier is a 55 y.o. female who presents for cardiac consult of Follow-up      HPI  The patient came in today for cardiac consult of Follow-up    PCP - Dr. Ari Sims    Kayleigh Frazier is a 55 y.o. female  HTN, JORGE, thyroidectomy due to thyroid nodules, hypothyroidism presents for follow up CV eval.     1/19/18  Pt has been having headaches last week. BP was elevated at [facility] 184/88, 169/93, 148/75. PT went to hospital, slipped on ice head her head on concrete, no syncope, likely concussion but was wearing a hat/cap - went to [facility].. She was given Tylenol, BP was 176/75 initialy then 173/70s, then 152/85. Did not have CT head due to normal neuro exam. 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     7/6/18  Pt had 2D ECHO in Jan 2018 which revealed normal BiV function and normal PA pressure 19 mmHg. BP is well controlled. Pt had MRI in Jan, pt has significant allergies and is due to see allergy specialist. PT does have occ headaches which improve with allergy meds.     5/6/20  She switched jobs working at [employer]. She helps with special projects. For the past two weeks has been having headcahes, she has been caring for her mom as well. She is unsure if anxiety causing issues. Last night 162/91 - 132/86, she just took a hot bath and came over after talking with her mother.     6/22/20  Started Coreg after last visit but could not function with it, HR was low. She followed up with PCP and held BB, thyroid meds changed. SHe was started on Bystolic but has knuckle pain, and arm pain. She had workup for inflammation, was abnormal but no RA or further workup.   ECG - NSR, V rate 66 with PVCs    8/12/20  Holter with frequent PVCs - Frequent extra beats - PVCS noted, double Bystolic to 10mg and monitor BP and heart rate and follow up with me soon. Her son had a child. She feels well overall, could not double bystolic due to low BP. She did not  take meds for a few days and felt palpitations. She will have thyroid meds adjusted.     11/13/20  She had sleep study - mild, non-positional obstructive sleep apnea (JORGE) only by RDI criteria. Had visit with Dr. Junior for joint pain, told to take Ralefen (NSAID) prn. She cannot tolerate CPAP mask has nose mask but improper tube. She does feel better though when she uses it. She can think more clearly. She changed Synthroid to 100 mcg.     Patient feels no chest pain, no sob, no PND,  no dizziness, no syncope, no CNS symptoms.    Patient has fairly good exercise tolerance.    Patient is compliant with medications.    1/19/18 ECG - Sinus bradycadia, V rate 48    FH - Grandmother - Stroke in 60s, mult strokes in family    2D ECHO  CONCLUSIONS     1 - No wall motion abnormalities.     2 - Normal left ventricular systolic function (EF 60-65%).     3 - Normal left ventricular diastolic function.     4 - Normal right ventricular systolic function .     5 - The estimated PA systolic pressure is 19 mmHg.     This document has been electronically    SIGNED BY: Beatris Salinas MD On: 01/24/2018 17:35    Past Medical History:   Diagnosis Date    Essential hypertension 1/19/2018    Heart murmur     Obstructive sleep apnea syndrome 8/5/2020       History reviewed. No pertinent surgical history.    Social History     Tobacco Use    Smoking status: Never Smoker    Smokeless tobacco: Never Used   Substance Use Topics    Alcohol use: Yes     Comment: occassionally    Drug use: No       Family History   Problem Relation Age of Onset    Hypertension Mother        Patient's Medications   New Prescriptions    No medications on file   Previous Medications    ALAHIST CF 2-10-20 MG TAB    TK 1 T PO BID PRN    ERGOCALCIFEROL, VITAMIN D2, (VITAMIN D ORAL)    Take 10,000 Units by mouth every 7 days.     LEVOTHYROXINE 125 MCG CAP    Take 100 mcg by mouth once daily.     LINZESS 145 MCG CAP CAPSULE    TK 1 C PO D    LIOTHYRONINE (CYTOMEL)  "5 MCG TAB    Take 5 mcg by mouth once daily.     MONTELUKAST (SINGULAIR) 10 MG TABLET    TK 1 T PO QD    NABUMETONE (RELAFEN) 500 MG TABLET    Take 500 mg by mouth daily as needed.     VITAMIN D (VITAMIN D3) 1000 UNITS TAB    Take 1,000 Units by mouth once daily.   Modified Medications    No medications on file   Discontinued Medications    No medications on file       Review of Systems   Constitutional: Negative.    HENT: Negative.    Eyes: Negative.    Respiratory: Negative.    Cardiovascular: Positive for palpitations.   Gastrointestinal: Negative.    Genitourinary: Negative.    Musculoskeletal: Positive for joint pain.   Skin: Negative.    Neurological: Negative.    Endo/Heme/Allergies: Negative.    Psychiatric/Behavioral: The patient is nervous/anxious.    All 12 systems otherwise negative.      Wt Readings from Last 3 Encounters:   11/13/20 73.4 kg (161 lb 13.1 oz)   09/04/20 71.5 kg (157 lb 10.1 oz)   08/12/20 70.1 kg (154 lb 8.7 oz)     Temp Readings from Last 3 Encounters:   No data found for Temp     BP Readings from Last 3 Encounters:   11/13/20 124/78   09/04/20 (!) 157/88   08/12/20 120/78     Pulse Readings from Last 3 Encounters:   11/13/20 (!) 57   09/04/20 (!) 59   08/12/20 (!) 57       /78 (BP Location: Left arm, Patient Position: Sitting, BP Method: Medium (Manual))   Pulse (!) 57   Resp 16   Ht 5' 6" (1.676 m)   Wt 73.4 kg (161 lb 13.1 oz)   SpO2 99%   BMI 26.12 kg/m²      Objective:   Physical Exam   Constitutional: She is oriented to person, place, and time. She appears well-developed and well-nourished. No distress.   HENT:   Head: Normocephalic and atraumatic.   Nose: Nose normal.   Mouth/Throat: Oropharynx is clear and moist. No oropharyngeal exudate.   Eyes: Conjunctivae and EOM are normal. Right eye exhibits no discharge. Left eye exhibits no discharge. No scleral icterus.   Neck: Normal range of motion. Neck supple. No JVD present. No thyromegaly present.   Cardiovascular: " Normal rate, regular rhythm, S1 normal and S2 normal. Exam reveals no gallop, no S3, no S4 and no friction rub.   No murmur heard.  Pulmonary/Chest: Effort normal and breath sounds normal. No stridor. No respiratory distress. She has no wheezes. She has no rales. She exhibits no tenderness.   Abdominal: Soft. Bowel sounds are normal. She exhibits no distension and no mass. There is no abdominal tenderness. There is no rebound.   Genitourinary:    Genitourinary Comments: Deferred     Musculoskeletal: Normal range of motion.         General: No tenderness, deformity or edema.   Lymphadenopathy:     She has no cervical adenopathy.   Neurological: She is alert and oriented to person, place, and time. She exhibits normal muscle tone. Coordination normal.   Skin: Skin is warm and dry. No rash noted. She is not diaphoretic. No erythema. No pallor.   Psychiatric: She has a normal mood and affect. Her behavior is normal. Judgment and thought content normal.   Nursing note and vitals reviewed.      Lab Results   Component Value Date     06/23/2020    K 3.7 06/23/2020     06/23/2020    CO2 31 (H) 06/23/2020    BUN 20 06/23/2020    CREATININE 0.8 06/23/2020     06/23/2020    AST 21 06/23/2020    ALT 17 06/23/2020    ALBUMIN 4.4 06/23/2020    PROT 8.7 (H) 06/23/2020    BILITOT 0.3 06/23/2020    WBC 5.58 06/23/2020    HGB 13.0 06/23/2020    HCT 41.0 06/23/2020    MCV 93 06/23/2020     06/23/2020    TSH 0.119 (L) 06/23/2020     Assessment:      1. PVC's (premature ventricular contractions)    2. Essential hypertension    3. Other specified hypothyroidism    4. Obstructive sleep apnea syndrome        Plan:   1. HTN with palpitations, PVCS noted  - stopped BB  - discussed low salt diet  - cont diet/exercise  - on coreg and Norvasc in past  - HCTZ PRN for bloating     2. Bradycardia, sinus   - cont to monitor  - asymptomatic     3. Hypothyroidism  - cont meds per PCP/Endo - changed dose     4. Joint pain  with arm swelling  - f/u rheum   - arm ultrasounds - arterial and venous - negative    5. JORGE  - started CPAP, will f/u pulm    Thank you for allowing me to participate in this patient's care. Please do not hesitate to contact me with any questions or concerns. Consult note has been forwarded to the referral physician.

## 2020-11-29 ENCOUNTER — PATIENT MESSAGE (OUTPATIENT)
Dept: PULMONOLOGY | Facility: CLINIC | Age: 55
End: 2020-11-29

## 2020-11-30 ENCOUNTER — TELEPHONE (OUTPATIENT)
Dept: PULMONOLOGY | Facility: CLINIC | Age: 55
End: 2020-11-30

## 2020-11-30 NOTE — TELEPHONE ENCOUNTER
Called patient and informed her of Kaila wanting her to come in for her 6 week compliance. Patient has appt for Friday. She is keeping the appt. Stated understanding and had no further questions

## 2020-12-04 ENCOUNTER — OFFICE VISIT (OUTPATIENT)
Dept: PULMONOLOGY | Facility: CLINIC | Age: 55
End: 2020-12-04
Payer: COMMERCIAL

## 2020-12-04 VITALS
RESPIRATION RATE: 16 BRPM | HEIGHT: 66 IN | SYSTOLIC BLOOD PRESSURE: 118 MMHG | BODY MASS INDEX: 25.72 KG/M2 | HEART RATE: 65 BPM | DIASTOLIC BLOOD PRESSURE: 72 MMHG | WEIGHT: 160.06 LBS | OXYGEN SATURATION: 98 %

## 2020-12-04 DIAGNOSIS — G47.33 OBSTRUCTIVE SLEEP APNEA SYNDROME: Primary | Chronic | ICD-10-CM

## 2020-12-04 PROCEDURE — 3078F PR MOST RECENT DIASTOLIC BLOOD PRESSURE < 80 MM HG: ICD-10-PCS | Mod: CPTII,S$GLB,, | Performed by: INTERNAL MEDICINE

## 2020-12-04 PROCEDURE — 99213 PR OFFICE/OUTPT VISIT, EST, LEVL III, 20-29 MIN: ICD-10-PCS | Mod: S$GLB,,, | Performed by: INTERNAL MEDICINE

## 2020-12-04 PROCEDURE — 3008F PR BODY MASS INDEX (BMI) DOCUMENTED: ICD-10-PCS | Mod: CPTII,S$GLB,, | Performed by: INTERNAL MEDICINE

## 2020-12-04 PROCEDURE — 99213 OFFICE O/P EST LOW 20 MIN: CPT | Mod: S$GLB,,, | Performed by: INTERNAL MEDICINE

## 2020-12-04 PROCEDURE — 3078F DIAST BP <80 MM HG: CPT | Mod: CPTII,S$GLB,, | Performed by: INTERNAL MEDICINE

## 2020-12-04 PROCEDURE — 3074F SYST BP LT 130 MM HG: CPT | Mod: CPTII,S$GLB,, | Performed by: INTERNAL MEDICINE

## 2020-12-04 PROCEDURE — 3008F BODY MASS INDEX DOCD: CPT | Mod: CPTII,S$GLB,, | Performed by: INTERNAL MEDICINE

## 2020-12-04 PROCEDURE — 99999 PR PBB SHADOW E&M-EST. PATIENT-LVL IV: ICD-10-PCS | Mod: PBBFAC,,, | Performed by: INTERNAL MEDICINE

## 2020-12-04 PROCEDURE — 3074F PR MOST RECENT SYSTOLIC BLOOD PRESSURE < 130 MM HG: ICD-10-PCS | Mod: CPTII,S$GLB,, | Performed by: INTERNAL MEDICINE

## 2020-12-04 PROCEDURE — 99999 PR PBB SHADOW E&M-EST. PATIENT-LVL IV: CPT | Mod: PBBFAC,,, | Performed by: INTERNAL MEDICINE

## 2020-12-04 RX ORDER — LEVOTHYROXINE SODIUM 100 UG/1
TABLET ORAL
COMMUNITY

## 2020-12-04 RX ORDER — DEXBROMPHENIRAMINE MALEATE, DEXTROMETHORPHAN HBR, PHENYLEPHRINE HCL 2; 20; 10 G/1; G/1; G/1
TABLET ORAL
COMMUNITY
Start: 2020-05-18 | End: 2021-07-30

## 2020-12-04 RX ORDER — CEPHRADINE 500 MG
CAPSULE ORAL
COMMUNITY

## 2020-12-04 RX ORDER — MONTELUKAST SODIUM 10 MG/1
TABLET ORAL
COMMUNITY
Start: 2020-05-18 | End: 2021-01-27

## 2020-12-04 NOTE — PROGRESS NOTES
Subjective:      Patient ID: Kayleigh Frazier is a 55 y.o. female.    Patient Active Problem List   Diagnosis    Essential hypertension    Other specified hypothyroidism    PVC's (premature ventricular contractions)    Obstructive sleep apnea syndrome     Problem list has been reviewed.      Chief Complaint: Sleep Apnea         HPI: Follow up for JORGE and CPAP complaince assessment.   she  is on APAP  of 4 - 20 CMWP .     She definitely thinks PAP is beneficial to her health and she wants to continue with PAP therapy.    Patient denies snoring, headaches, excessive daytime sleepiness      Compliance Summary  9/30/2020 - 10/29/2020 (30 days)  Days with Device Usage 14 days  Days without Device Usage 16 days  Percent Days with Device Usage 46.7%  Cumulative Usage 2 days 18 hrs. 49 mins. 21 secs.  Maximum Usage (1 Day) 9 hrs. 6 mins. 34 secs.  Average Usage (All Days) 2 hrs. 13 mins. 38 secs.  Average Usage (Days Used) 4 hrs. 46 mins. 22 secs.  Minimum Usage (1 Day) 2 mins. 50 secs.  Percent of Days with Usage >= 4 Hours 26.7%  Percent of Days with Usage < 4 Hours 73.3%  Total Blower Time 3 days 3 hrs. 11 mins. 26 secs.  Average AHI 2.9  Auto-CPAP Summary  Auto-CPAP Mean Pressure 6.0 cmH2O  Auto-CPAP Peak Average Pressure 9.4 cmH2O  Average Device Pressure <= 90% of Time 8.3 cmH2O  Average Time in Large Leak Per Day 9 mins. 13 secs.      [place] Sleepiness Scale       EPWORTH SLEEPINESS SCALE 12/4/2020 8/5/2020   Sitting and reading 0 1   Watching TV 0 1   Sitting, inactive in a public place (e.g. a theatre or a meeting) 0 0   As a passenger in a car for an hour without a break 1 0   Lying down to rest in the afternoon when circumstances permit 0 1   Sitting and talking to someone 0 0   Sitting quietly after a lunch without alcohol 0 1   In a car, while stopped for a few minutes in traffic 0 0   Total score 1 4       Previous Report Reviewed: lab reports, office notes and radiology reports     The following  "portions of the patient's history were reviewed and updated as appropriate: allergies, current medications, past family history, past medical history, past social history, past surgical history and problem list.    Review of Systems   Constitutional: Positive for fatigue. Negative for fever, chills and night sweats.   HENT: Positive for rhinorrhea and congestion.    Eyes: Positive for itching.   Respiratory: Positive for snoring. Negative for cough, wheezing and dyspnea on extertion.    Cardiovascular: Negative for chest pain, palpitations and leg swelling.   Genitourinary: Negative for difficulty urinating.   Endocrine: Negative for polydipsia, cold intolerance and heat intolerance.    Musculoskeletal: Positive for arthralgias and back pain.   Gastrointestinal: Positive for acid reflux. Negative for nausea, vomiting and abdominal pain.   Neurological: Negative for dizziness, light-headedness and headaches.   Hematological: Excessive bruising.   Psychiatric/Behavioral: The patient is nervous/anxious.    All other systems reviewed and are negative.       Objective:     Vitals:    12/04/20 0937   BP: 118/72   Pulse: 65   Resp: 16   SpO2: 98%   Weight: 72.6 kg (160 lb 0.9 oz)   Height: 5' 6" (1.676 m)     body mass index is 25.83 kg/m².    Physical Exam  Vitals signs and nursing note reviewed.   HENT:      Head: Normocephalic and atraumatic.      Nose: Nose normal.      Mouth/Throat:      Mouth: Mucous membranes are dry.   Eyes:      Extraocular Movements: Extraocular movements intact.      Pupils: Pupils are equal, round, and reactive to light.   Neck:      Musculoskeletal: Normal range of motion and neck supple.   Cardiovascular:      Rate and Rhythm: Normal rate and regular rhythm.   Pulmonary:      Effort: Pulmonary effort is normal.      Breath sounds: Normal breath sounds.   Abdominal:      General: Abdomen is flat.      Palpations: Abdomen is soft.   Skin:     General: Skin is warm and dry.      Capillary " Refill: Capillary refill takes less than 2 seconds.   Neurological:      General: No focal deficit present.      Mental Status: She is alert and oriented to person, place, and time.   Psychiatric:         Mood and Affect: Mood normal.         Behavior: Behavior normal.         Personal Diagnostic Review  CPAP complaince download.     Assessment / plan     Discussed diagnosis, its evaluation, treatment and usual course. All questions answered.    Problem List Items Addressed This Visit        Other    Obstructive sleep apnea syndrome - Primary    Current Assessment & Plan     [place]AP of 4 - 20. [place] [place] [facility]     Discussed therapeutic goals for positive airway pressure therapy(CPAP or BiPAP): Ideal is usage 100% of nights for 6 - 8 hours per night. Minimum usage is 70% of night for at least 4 hours per night used.     Driving: patient is advised for extreme caution when driving or operating machinery if there is a feeling of drowsiness, especially while driving it is preferable to stop driving and take a brief nap.    Sleep hygiene:Avoid supine sleep, sleep on sides. Avoid sleep deprivation. Explained sleep hygiene. Advice to avoid Alcohol and sedative    Pateint expressed understanding. All Questions answered.    CPAP compliance in 6 weeks.                     TIME SPENT WITH PATIENT: Time spent: 25 minutes in face to face  discussion concerning diagnosis, prognosis, review of lab and test results, benefits of treatment as well as management of disease, counseling of patient and coordination of care between various health  care providers . Greater than half the time spent was used for coordination of care and counseling of patient.     Follow up in about 6 weeks (around 1/15/2021) for JORGE.

## 2020-12-04 NOTE — ASSESSMENT & PLAN NOTE
Continue APAP of 4 - 20. ([facility] [place] [facility])     Discussed therapeutic goals for positive airway pressure therapy(CPAP or BiPAP): Ideal is usage 100% of nights for 6 - 8 hours per night. Minimum usage is 70% of night for at least 4 hours per night used.     Driving: patient is advised for extreme caution when driving or operating machinery if there is a feeling of drowsiness, especially while driving it is preferable to stop driving and take a brief nap.    Sleep hygiene:Avoid supine sleep, sleep on sides. Avoid sleep deprivation. Explained sleep hygiene. Advice to avoid Alcohol and sedative    Pateint expressed understanding. All Questions answered.    CPAP compliance in 6 weeks.

## 2020-12-04 NOTE — PATIENT INSTRUCTIONS
Obstructive Sleep Apnea  Obstructive sleep apnea is a condition that causes your air passages to become narrowed or blocked during sleep. As a result, breathing stops for short periods. Your body wakes up enough for breathing to begin again, though you don't remember it. The cycle of stopped breathing and brief awakenings can repeat dozens of times a night. This prevents the body from getting to the deeper stages of sleep that are needed for good rest and may cause your body's oxygen level to fall.  Signs of sleep apnea include loud snoring, noisy breathing, and gasping sounds during sleep. Daytime symptoms include waking up tired after a full night's sleep, waking up with headaches, feeling very sleepy or falling asleep during the day, and having problems with memory or concentration.  Risk factors for sleep apnea include:  · Being overweight  · Being a man, or a woman in menopause  · Smoking  · Using alcohol or sedating medicines  · Having enlarged structures in the nose or throat  Home care  Lifestyle changes that can help treat snoring and sleep apnea include the following:  · If you are overweight, lose weight. Talk to your healthcare provider about a weight-loss plan for you.  · Avoid alcohol for 3 to 4 hours before bedtime. Avoid sedating medications. Ask your healthcare provider about the medicines you take.  · If you smoke, talk to your healthcare provider about ways to quit.  · Sleep on your side. This can help prevent gravity from pulling relaxed throat tissues into your breathing passages.  · If you have allergies or sinus problems that block your nose, ask your healthcare provider for help.  Follow-up care  Follow up with your healthcare provider, or as advised. A diagnosis of sleep apnea is made with a sleep study. Your healthcare provider can tell you more about this test.  When to seek medical advice  Sleep apnea can make you more likely to have certain health problems. These include high blood  pressure, heart attack, stroke, and sexual dysfunction. If you have sleep apnea, talk to your healthcare provider about the best treatments for you.  Date Last Reviewed: 4/1/2017  © 7512-0569 [employer]. [address] [place] [address], [place] [address]. All rights reserved. This information is not intended as a substitute for professional medical care. Always follow your healthcare professional's instructions.

## 2021-01-27 ENCOUNTER — OFFICE VISIT (OUTPATIENT)
Dept: SLEEP MEDICINE | Facility: CLINIC | Age: 56
End: 2021-01-27
Payer: COMMERCIAL

## 2021-01-27 VITALS
BODY MASS INDEX: 26.19 KG/M2 | HEART RATE: 97 BPM | RESPIRATION RATE: 18 BRPM | HEIGHT: 66 IN | OXYGEN SATURATION: 97 % | WEIGHT: 162.94 LBS | SYSTOLIC BLOOD PRESSURE: 130 MMHG | DIASTOLIC BLOOD PRESSURE: 70 MMHG

## 2021-01-27 DIAGNOSIS — G47.33 OBSTRUCTIVE SLEEP APNEA SYNDROME: Primary | ICD-10-CM

## 2021-01-27 DIAGNOSIS — I49.3 PVC'S (PREMATURE VENTRICULAR CONTRACTIONS): ICD-10-CM

## 2021-01-27 DIAGNOSIS — I10 ESSENTIAL HYPERTENSION: ICD-10-CM

## 2021-01-27 PROCEDURE — 3075F SYST BP GE 130 - 139MM HG: CPT | Mod: CPTII,S$GLB,, | Performed by: NURSE PRACTITIONER

## 2021-01-27 PROCEDURE — 3078F PR MOST RECENT DIASTOLIC BLOOD PRESSURE < 80 MM HG: ICD-10-PCS | Mod: CPTII,S$GLB,, | Performed by: NURSE PRACTITIONER

## 2021-01-27 PROCEDURE — 3008F PR BODY MASS INDEX (BMI) DOCUMENTED: ICD-10-PCS | Mod: CPTII,S$GLB,, | Performed by: NURSE PRACTITIONER

## 2021-01-27 PROCEDURE — 99999 PR PBB SHADOW E&M-EST. PATIENT-LVL IV: ICD-10-PCS | Mod: PBBFAC,,, | Performed by: NURSE PRACTITIONER

## 2021-01-27 PROCEDURE — 3008F BODY MASS INDEX DOCD: CPT | Mod: CPTII,S$GLB,, | Performed by: NURSE PRACTITIONER

## 2021-01-27 PROCEDURE — 3075F PR MOST RECENT SYSTOLIC BLOOD PRESS GE 130-139MM HG: ICD-10-PCS | Mod: CPTII,S$GLB,, | Performed by: NURSE PRACTITIONER

## 2021-01-27 PROCEDURE — 99214 OFFICE O/P EST MOD 30 MIN: CPT | Mod: S$GLB,,, | Performed by: NURSE PRACTITIONER

## 2021-01-27 PROCEDURE — 99999 PR PBB SHADOW E&M-EST. PATIENT-LVL IV: CPT | Mod: PBBFAC,,, | Performed by: NURSE PRACTITIONER

## 2021-01-27 PROCEDURE — 99214 PR OFFICE/OUTPT VISIT, EST, LEVL IV, 30-39 MIN: ICD-10-PCS | Mod: S$GLB,,, | Performed by: NURSE PRACTITIONER

## 2021-01-27 PROCEDURE — 3078F DIAST BP <80 MM HG: CPT | Mod: CPTII,S$GLB,, | Performed by: NURSE PRACTITIONER

## 2021-01-27 RX ORDER — ESTRADIOL 0.5 MG/.5G
GEL TOPICAL
COMMUNITY
End: 2021-07-30

## 2021-02-04 ENCOUNTER — TELEPHONE (OUTPATIENT)
Dept: RHEUMATOLOGY | Facility: CLINIC | Age: 56
End: 2021-02-04

## 2021-02-05 ENCOUNTER — TELEPHONE (OUTPATIENT)
Dept: RHEUMATOLOGY | Facility: CLINIC | Age: 56
End: 2021-02-05

## 2021-02-12 ENCOUNTER — PATIENT MESSAGE (OUTPATIENT)
Dept: RHEUMATOLOGY | Facility: CLINIC | Age: 56
End: 2021-02-12

## 2021-02-25 ENCOUNTER — PATIENT MESSAGE (OUTPATIENT)
Dept: CARDIOLOGY | Facility: CLINIC | Age: 56
End: 2021-02-25

## 2021-02-25 ENCOUNTER — PATIENT MESSAGE (OUTPATIENT)
Dept: PULMONOLOGY | Facility: CLINIC | Age: 56
End: 2021-02-25

## 2021-03-31 ENCOUNTER — PATIENT MESSAGE (OUTPATIENT)
Dept: RHEUMATOLOGY | Facility: CLINIC | Age: 56
End: 2021-03-31

## 2021-03-31 ENCOUNTER — TELEPHONE (OUTPATIENT)
Dept: RHEUMATOLOGY | Facility: CLINIC | Age: 56
End: 2021-03-31

## 2021-04-01 ENCOUNTER — OFFICE VISIT (OUTPATIENT)
Dept: RHEUMATOLOGY | Facility: CLINIC | Age: 56
End: 2021-04-01
Payer: COMMERCIAL

## 2021-04-01 VITALS
BODY MASS INDEX: 26.09 KG/M2 | HEART RATE: 52 BPM | WEIGHT: 162.38 LBS | HEIGHT: 66 IN | SYSTOLIC BLOOD PRESSURE: 135 MMHG | DIASTOLIC BLOOD PRESSURE: 78 MMHG

## 2021-04-01 DIAGNOSIS — R76.8 POSITIVE ANA (ANTINUCLEAR ANTIBODY): Primary | ICD-10-CM

## 2021-04-01 DIAGNOSIS — Z71.89 COUNSELING ON HEALTH PROMOTION AND DISEASE PREVENTION: ICD-10-CM

## 2021-04-01 DIAGNOSIS — M25.50 ARTHRALGIA, UNSPECIFIED JOINT: ICD-10-CM

## 2021-04-01 PROCEDURE — 3008F BODY MASS INDEX DOCD: CPT | Mod: CPTII,S$GLB,, | Performed by: INTERNAL MEDICINE

## 2021-04-01 PROCEDURE — 3075F PR MOST RECENT SYSTOLIC BLOOD PRESS GE 130-139MM HG: ICD-10-PCS | Mod: CPTII,S$GLB,, | Performed by: INTERNAL MEDICINE

## 2021-04-01 PROCEDURE — 99999 PR PBB SHADOW E&M-EST. PATIENT-LVL IV: ICD-10-PCS | Mod: PBBFAC,,, | Performed by: INTERNAL MEDICINE

## 2021-04-01 PROCEDURE — 3075F SYST BP GE 130 - 139MM HG: CPT | Mod: CPTII,S$GLB,, | Performed by: INTERNAL MEDICINE

## 2021-04-01 PROCEDURE — 1125F AMNT PAIN NOTED PAIN PRSNT: CPT | Mod: S$GLB,,, | Performed by: INTERNAL MEDICINE

## 2021-04-01 PROCEDURE — 99214 OFFICE O/P EST MOD 30 MIN: CPT | Mod: S$GLB,,, | Performed by: INTERNAL MEDICINE

## 2021-04-01 PROCEDURE — 99999 PR PBB SHADOW E&M-EST. PATIENT-LVL IV: CPT | Mod: PBBFAC,,, | Performed by: INTERNAL MEDICINE

## 2021-04-01 PROCEDURE — 3078F PR MOST RECENT DIASTOLIC BLOOD PRESSURE < 80 MM HG: ICD-10-PCS | Mod: CPTII,S$GLB,, | Performed by: INTERNAL MEDICINE

## 2021-04-01 PROCEDURE — 3078F DIAST BP <80 MM HG: CPT | Mod: CPTII,S$GLB,, | Performed by: INTERNAL MEDICINE

## 2021-04-01 PROCEDURE — 99214 PR OFFICE/OUTPT VISIT, EST, LEVL IV, 30-39 MIN: ICD-10-PCS | Mod: S$GLB,,, | Performed by: INTERNAL MEDICINE

## 2021-04-01 PROCEDURE — 3008F PR BODY MASS INDEX (BMI) DOCUMENTED: ICD-10-PCS | Mod: CPTII,S$GLB,, | Performed by: INTERNAL MEDICINE

## 2021-04-01 PROCEDURE — 1125F PR PAIN SEVERITY QUANTIFIED, PAIN PRESENT: ICD-10-PCS | Mod: S$GLB,,, | Performed by: INTERNAL MEDICINE

## 2021-04-01 RX ORDER — ESTRADIOL 1 MG/G
GEL TOPICAL
COMMUNITY
End: 2021-07-30

## 2021-04-01 RX ORDER — TRIAMCINOLONE ACETONIDE 0.25 MG/G
CREAM TOPICAL
COMMUNITY
Start: 2021-02-11

## 2021-04-01 RX ORDER — VALACYCLOVIR HYDROCHLORIDE 500 MG/1
500 TABLET, FILM COATED ORAL 2 TIMES DAILY
COMMUNITY
Start: 2021-02-11

## 2021-04-01 RX ORDER — MAGNESIUM SULFATE 100 %
CRYSTALS MISCELLANEOUS
COMMUNITY

## 2021-05-24 DIAGNOSIS — G47.33 OBSTRUCTIVE SLEEP APNEA SYNDROME: ICD-10-CM

## 2021-05-24 DIAGNOSIS — I10 ESSENTIAL HYPERTENSION: Primary | ICD-10-CM

## 2021-05-24 DIAGNOSIS — I49.3 PVC'S (PREMATURE VENTRICULAR CONTRACTIONS): ICD-10-CM

## 2021-05-31 ENCOUNTER — OFFICE VISIT (OUTPATIENT)
Dept: CARDIOLOGY | Facility: CLINIC | Age: 56
End: 2021-05-31
Payer: COMMERCIAL

## 2021-05-31 ENCOUNTER — HOSPITAL ENCOUNTER (OUTPATIENT)
Dept: CARDIOLOGY | Facility: HOSPITAL | Age: 56
Discharge: HOME | End: 2021-05-31
Attending: INTERNAL MEDICINE
Payer: COMMERCIAL

## 2021-05-31 VITALS
HEIGHT: 66 IN | OXYGEN SATURATION: 98 % | HEART RATE: 72 BPM | WEIGHT: 158.5 LBS | BODY MASS INDEX: 25.47 KG/M2 | RESPIRATION RATE: 16 BRPM | SYSTOLIC BLOOD PRESSURE: 120 MMHG | DIASTOLIC BLOOD PRESSURE: 70 MMHG

## 2021-05-31 DIAGNOSIS — G47.33 OBSTRUCTIVE SLEEP APNEA SYNDROME: ICD-10-CM

## 2021-05-31 DIAGNOSIS — I10 ESSENTIAL HYPERTENSION: ICD-10-CM

## 2021-05-31 DIAGNOSIS — E03.8 OTHER SPECIFIED HYPOTHYROIDISM: ICD-10-CM

## 2021-05-31 DIAGNOSIS — I49.3 PVC'S (PREMATURE VENTRICULAR CONTRACTIONS): ICD-10-CM

## 2021-05-31 DIAGNOSIS — I49.3 PVC'S (PREMATURE VENTRICULAR CONTRACTIONS): Primary | ICD-10-CM

## 2021-05-31 DIAGNOSIS — R00.2 PALPITATIONS: ICD-10-CM

## 2021-05-31 PROCEDURE — 99214 PR OFFICE/OUTPT VISIT, EST, LEVL IV, 30-39 MIN: ICD-10-PCS | Mod: S$GLB,,, | Performed by: INTERNAL MEDICINE

## 2021-05-31 PROCEDURE — 93010 ELECTROCARDIOGRAM REPORT: CPT | Mod: ,,, | Performed by: INTERNAL MEDICINE

## 2021-05-31 PROCEDURE — 99214 OFFICE O/P EST MOD 30 MIN: CPT | Mod: S$GLB,,, | Performed by: INTERNAL MEDICINE

## 2021-05-31 PROCEDURE — 93010 EKG 12-LEAD: ICD-10-PCS | Mod: ,,, | Performed by: INTERNAL MEDICINE

## 2021-05-31 PROCEDURE — 3008F BODY MASS INDEX DOCD: CPT | Mod: CPTII,S$GLB,, | Performed by: INTERNAL MEDICINE

## 2021-05-31 PROCEDURE — 99999 PR PBB SHADOW E&M-EST. PATIENT-LVL III: ICD-10-PCS | Mod: PBBFAC,,, | Performed by: INTERNAL MEDICINE

## 2021-05-31 PROCEDURE — 99999 PR PBB SHADOW E&M-EST. PATIENT-LVL III: CPT | Mod: PBBFAC,,, | Performed by: INTERNAL MEDICINE

## 2021-05-31 PROCEDURE — 3008F PR BODY MASS INDEX (BMI) DOCUMENTED: ICD-10-PCS | Mod: CPTII,S$GLB,, | Performed by: INTERNAL MEDICINE

## 2021-05-31 PROCEDURE — 93005 ELECTROCARDIOGRAM TRACING: CPT | Mod: PO

## 2021-07-30 ENCOUNTER — OFFICE VISIT (OUTPATIENT)
Dept: PULMONOLOGY | Facility: CLINIC | Age: 56
End: 2021-07-30
Payer: COMMERCIAL

## 2021-07-30 VITALS
HEART RATE: 66 BPM | SYSTOLIC BLOOD PRESSURE: 120 MMHG | RESPIRATION RATE: 16 BRPM | DIASTOLIC BLOOD PRESSURE: 60 MMHG | HEIGHT: 66 IN | OXYGEN SATURATION: 98 % | BODY MASS INDEX: 25.15 KG/M2 | WEIGHT: 156.5 LBS

## 2021-07-30 DIAGNOSIS — G47.33 OSA ON CPAP: Primary | ICD-10-CM

## 2021-07-30 DIAGNOSIS — E03.8 OTHER SPECIFIED HYPOTHYROIDISM: ICD-10-CM

## 2021-07-30 DIAGNOSIS — I10 ESSENTIAL HYPERTENSION: ICD-10-CM

## 2021-07-30 PROBLEM — F32.9 MAJOR DEPRESSION, SINGLE EPISODE: Status: ACTIVE | Noted: 2021-07-30

## 2021-07-30 PROCEDURE — 3074F PR MOST RECENT SYSTOLIC BLOOD PRESSURE < 130 MM HG: ICD-10-PCS | Mod: CPTII,S$GLB,, | Performed by: INTERNAL MEDICINE

## 2021-07-30 PROCEDURE — 99214 PR OFFICE/OUTPT VISIT, EST, LEVL IV, 30-39 MIN: ICD-10-PCS | Mod: S$GLB,,, | Performed by: INTERNAL MEDICINE

## 2021-07-30 PROCEDURE — 3074F SYST BP LT 130 MM HG: CPT | Mod: CPTII,S$GLB,, | Performed by: INTERNAL MEDICINE

## 2021-07-30 PROCEDURE — 3008F PR BODY MASS INDEX (BMI) DOCUMENTED: ICD-10-PCS | Mod: CPTII,S$GLB,, | Performed by: INTERNAL MEDICINE

## 2021-07-30 PROCEDURE — 1160F RVW MEDS BY RX/DR IN RCRD: CPT | Mod: CPTII,S$GLB,, | Performed by: INTERNAL MEDICINE

## 2021-07-30 PROCEDURE — 1160F PR REVIEW ALL MEDS BY PRESCRIBER/CLIN PHARMACIST DOCUMENTED: ICD-10-PCS | Mod: CPTII,S$GLB,, | Performed by: INTERNAL MEDICINE

## 2021-07-30 PROCEDURE — 99999 PR PBB SHADOW E&M-EST. PATIENT-LVL IV: ICD-10-PCS | Mod: PBBFAC,,, | Performed by: INTERNAL MEDICINE

## 2021-07-30 PROCEDURE — 3078F PR MOST RECENT DIASTOLIC BLOOD PRESSURE < 80 MM HG: ICD-10-PCS | Mod: CPTII,S$GLB,, | Performed by: INTERNAL MEDICINE

## 2021-07-30 PROCEDURE — 99999 PR PBB SHADOW E&M-EST. PATIENT-LVL IV: CPT | Mod: PBBFAC,,, | Performed by: INTERNAL MEDICINE

## 2021-07-30 PROCEDURE — 1159F MED LIST DOCD IN RCRD: CPT | Mod: CPTII,S$GLB,, | Performed by: INTERNAL MEDICINE

## 2021-07-30 PROCEDURE — 3008F BODY MASS INDEX DOCD: CPT | Mod: CPTII,S$GLB,, | Performed by: INTERNAL MEDICINE

## 2021-07-30 PROCEDURE — 3078F DIAST BP <80 MM HG: CPT | Mod: CPTII,S$GLB,, | Performed by: INTERNAL MEDICINE

## 2021-07-30 PROCEDURE — 1159F PR MEDICATION LIST DOCUMENTED IN MEDICAL RECORD: ICD-10-PCS | Mod: CPTII,S$GLB,, | Performed by: INTERNAL MEDICINE

## 2021-07-30 PROCEDURE — 99214 OFFICE O/P EST MOD 30 MIN: CPT | Mod: S$GLB,,, | Performed by: INTERNAL MEDICINE

## 2021-08-03 ENCOUNTER — TELEPHONE (OUTPATIENT)
Dept: PULMONOLOGY | Facility: CLINIC | Age: 56
End: 2021-08-03

## 2021-09-06 ENCOUNTER — PATIENT MESSAGE (OUTPATIENT)
Dept: CARDIOLOGY | Facility: CLINIC | Age: 56
End: 2021-09-06

## 2021-09-08 ENCOUNTER — HOSPITAL ENCOUNTER (OUTPATIENT)
Dept: CARDIOLOGY | Facility: HOSPITAL | Age: 56
Discharge: HOME | End: 2021-09-08
Attending: INTERNAL MEDICINE
Payer: COMMERCIAL

## 2021-09-08 ENCOUNTER — OFFICE VISIT (OUTPATIENT)
Dept: CARDIOLOGY | Facility: CLINIC | Age: 56
End: 2021-09-08
Payer: COMMERCIAL

## 2021-09-08 VITALS
BODY MASS INDEX: 25.18 KG/M2 | SYSTOLIC BLOOD PRESSURE: 134 MMHG | WEIGHT: 156 LBS | HEART RATE: 62 BPM | OXYGEN SATURATION: 99 % | DIASTOLIC BLOOD PRESSURE: 80 MMHG

## 2021-09-08 DIAGNOSIS — E03.8 OTHER SPECIFIED HYPOTHYROIDISM: ICD-10-CM

## 2021-09-08 DIAGNOSIS — G47.33 OSA ON CPAP: ICD-10-CM

## 2021-09-08 DIAGNOSIS — R00.2 PALPITATIONS: ICD-10-CM

## 2021-09-08 DIAGNOSIS — I49.3 PVC'S (PREMATURE VENTRICULAR CONTRACTIONS): ICD-10-CM

## 2021-09-08 DIAGNOSIS — I49.3 PVC'S (PREMATURE VENTRICULAR CONTRACTIONS): Primary | ICD-10-CM

## 2021-09-08 DIAGNOSIS — I10 ESSENTIAL HYPERTENSION: Primary | ICD-10-CM

## 2021-09-08 DIAGNOSIS — R00.1 SINUS BRADYCARDIA: ICD-10-CM

## 2021-09-08 PROCEDURE — 3008F PR BODY MASS INDEX (BMI) DOCUMENTED: ICD-10-PCS | Mod: CPTII,S$GLB,, | Performed by: INTERNAL MEDICINE

## 2021-09-08 PROCEDURE — 99999 PR PBB SHADOW E&M-EST. PATIENT-LVL III: ICD-10-PCS | Mod: PBBFAC,,, | Performed by: INTERNAL MEDICINE

## 2021-09-08 PROCEDURE — 3079F DIAST BP 80-89 MM HG: CPT | Mod: CPTII,S$GLB,, | Performed by: INTERNAL MEDICINE

## 2021-09-08 PROCEDURE — 1160F RVW MEDS BY RX/DR IN RCRD: CPT | Mod: CPTII,S$GLB,, | Performed by: INTERNAL MEDICINE

## 2021-09-08 PROCEDURE — 1160F PR REVIEW ALL MEDS BY PRESCRIBER/CLIN PHARMACIST DOCUMENTED: ICD-10-PCS | Mod: CPTII,S$GLB,, | Performed by: INTERNAL MEDICINE

## 2021-09-08 PROCEDURE — 1159F PR MEDICATION LIST DOCUMENTED IN MEDICAL RECORD: ICD-10-PCS | Mod: CPTII,S$GLB,, | Performed by: INTERNAL MEDICINE

## 2021-09-08 PROCEDURE — 99214 OFFICE O/P EST MOD 30 MIN: CPT | Mod: S$GLB,,, | Performed by: INTERNAL MEDICINE

## 2021-09-08 PROCEDURE — 99999 PR PBB SHADOW E&M-EST. PATIENT-LVL III: CPT | Mod: PBBFAC,,, | Performed by: INTERNAL MEDICINE

## 2021-09-08 PROCEDURE — 1159F MED LIST DOCD IN RCRD: CPT | Mod: CPTII,S$GLB,, | Performed by: INTERNAL MEDICINE

## 2021-09-08 PROCEDURE — 3075F PR MOST RECENT SYSTOLIC BLOOD PRESS GE 130-139MM HG: ICD-10-PCS | Mod: CPTII,S$GLB,, | Performed by: INTERNAL MEDICINE

## 2021-09-08 PROCEDURE — 3079F PR MOST RECENT DIASTOLIC BLOOD PRESSURE 80-89 MM HG: ICD-10-PCS | Mod: CPTII,S$GLB,, | Performed by: INTERNAL MEDICINE

## 2021-09-08 PROCEDURE — 3075F SYST BP GE 130 - 139MM HG: CPT | Mod: CPTII,S$GLB,, | Performed by: INTERNAL MEDICINE

## 2021-09-08 PROCEDURE — 99214 PR OFFICE/OUTPT VISIT, EST, LEVL IV, 30-39 MIN: ICD-10-PCS | Mod: S$GLB,,, | Performed by: INTERNAL MEDICINE

## 2021-09-08 PROCEDURE — 93005 ELECTROCARDIOGRAM TRACING: CPT

## 2021-09-08 PROCEDURE — 93010 EKG 12-LEAD: ICD-10-PCS | Mod: ,,, | Performed by: INTERNAL MEDICINE

## 2021-09-08 PROCEDURE — 3008F BODY MASS INDEX DOCD: CPT | Mod: CPTII,S$GLB,, | Performed by: INTERNAL MEDICINE

## 2021-09-08 PROCEDURE — 93010 ELECTROCARDIOGRAM REPORT: CPT | Mod: ,,, | Performed by: INTERNAL MEDICINE

## 2021-12-01 ENCOUNTER — PATIENT MESSAGE (OUTPATIENT)
Dept: CARDIOLOGY | Facility: CLINIC | Age: 56
End: 2021-12-01
Payer: COMMERCIAL

## 2021-12-01 RX ORDER — HYDROCHLOROTHIAZIDE 12.5 MG/1
12.5 TABLET ORAL DAILY PRN
Qty: 30 TABLET | Refills: 3 | Status: SHIPPED | OUTPATIENT
Start: 2021-12-01 | End: 2022-01-28 | Stop reason: SDUPTHER

## 2022-01-12 ENCOUNTER — PATIENT MESSAGE (OUTPATIENT)
Dept: CARDIOLOGY | Facility: CLINIC | Age: 57
End: 2022-01-12
Payer: COMMERCIAL

## 2022-01-28 ENCOUNTER — TELEPHONE (OUTPATIENT)
Dept: CARDIOLOGY | Facility: CLINIC | Age: 57
End: 2022-01-28

## 2022-01-28 ENCOUNTER — OFFICE VISIT (OUTPATIENT)
Dept: CARDIOLOGY | Facility: CLINIC | Age: 57
End: 2022-01-28
Payer: COMMERCIAL

## 2022-01-28 DIAGNOSIS — R00.2 PALPITATIONS: ICD-10-CM

## 2022-01-28 DIAGNOSIS — G47.33 OBSTRUCTIVE SLEEP APNEA SYNDROME: ICD-10-CM

## 2022-01-28 DIAGNOSIS — I49.3 PVC'S (PREMATURE VENTRICULAR CONTRACTIONS): Primary | ICD-10-CM

## 2022-01-28 DIAGNOSIS — E03.8 OTHER SPECIFIED HYPOTHYROIDISM: ICD-10-CM

## 2022-01-28 DIAGNOSIS — G47.33 OSA ON CPAP: ICD-10-CM

## 2022-01-28 DIAGNOSIS — R00.1 SINUS BRADYCARDIA: ICD-10-CM

## 2022-01-28 DIAGNOSIS — I10 ESSENTIAL HYPERTENSION: ICD-10-CM

## 2022-01-28 DIAGNOSIS — G47.39 OTHER SLEEP APNEA: ICD-10-CM

## 2022-01-28 PROCEDURE — 99214 PR OFFICE/OUTPT VISIT, EST, LEVL IV, 30-39 MIN: ICD-10-PCS | Mod: 95,,, | Performed by: INTERNAL MEDICINE

## 2022-01-28 PROCEDURE — 99214 OFFICE O/P EST MOD 30 MIN: CPT | Mod: 95,,, | Performed by: INTERNAL MEDICINE

## 2022-01-28 RX ORDER — HYDROCHLOROTHIAZIDE 12.5 MG/1
12.5 TABLET ORAL DAILY PRN
Qty: 30 TABLET | Refills: 3 | Status: SHIPPED | OUTPATIENT
Start: 2022-01-28 | End: 2022-08-12 | Stop reason: SDUPTHER

## 2022-01-28 NOTE — TELEPHONE ENCOUNTER
Spoke with the patient and scheduled appt    ----- Message from Jose Antonio Mcdowell MD sent at 1/28/2022  3:54 PM CST -----  6 month follow up thanks

## 2022-01-28 NOTE — PROGRESS NOTES
Subjective:   Patient ID:  Kayleigh Frazier is a 56 y.o. female who presents for cardiac consult of No chief complaint on file.    The patient location is: home  The chief complaint leading to consultation is: CV follow up    Visit type: audiovisual    Face to Face time with patient: 12 min  46 minutes of total time spent on the encounter, which includes face to face time and non-face to face time preparing to see the patient (eg, review of tests), Obtaining and/or reviewing separately obtained history, Documenting clinical information in the electronic or other health record, Independently interpreting results (not separately reported) and communicating results to the patient/family/caregiver, or Care coordination (not separately reported).         Each patient to whom he or she provides medical services by telemedicine is:  (1) informed of the relationship between the physician and patient and the respective role of any other health care provider with respect to management of the patient; and (2) notified that he or she may decline to receive medical services by telemedicine and may withdraw from such care at any time.    Notes:       HPI  The patient came in today for cardiac consult of No chief complaint on file.    PCP - Dr. Ari Sims    Kayleigh Frazier is a 56 y.o. female  HTN, PVCs, JORGE, thyroidectomy due to thyroid nodules, hypothyroidism presents for follow up CV eval.     1/19/18  Pt has been having headaches last week. BP was elevated at [facility] 184/88, 169/93, 148/75. PT went to hospital, slipped on ice head her head on concrete, no syncope, likely concussion but was wearing a hat/cap - went to [facility].. She was given Tylenol, BP was 176/75 initialy then 173/70s, then 152/85. Did not have CT head due to normal neuro exam. 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     7/6/18  Pt had 2D ECHO in Jan 2018 which revealed normal BiV function and normal PA pressure 19 mmHg. BP is well  controlled. Pt had MRI in Jan, pt has significant allergies and is due to see allergy specialist. PT does have occ headaches which improve with allergy meds.     5/6/20  She switched jobs working at [employer]. She helps with special projects. For the past two weeks has been having headcahes, she has been caring for her mom as well. She is unsure if anxiety causing issues. Last night 162/91 - 132/86, she just took a hot bath and came over after talking with her mother.     6/22/20  Started Coreg after last visit but could not function with it, HR was low. She followed up with PCP and held BB, thyroid meds changed. SHe was started on Bystolic but has knuckle pain, and arm pain. She had workup for inflammation, was abnormal but no RA or further workup.   ECG - NSR, V rate 66 with PVCs    8/12/20  Holter with frequent PVCs - Frequent extra beats - PVCS noted, double Bystolic to 10mg and monitor BP and heart rate and follow up with me soon. Her son had a child. She feels well overall, could not double bystolic due to low BP. She did not take meds for a few days and felt palpitations. She will have thyroid meds adjusted.     11/13/20  She had sleep study - mild, non-positional obstructive sleep apnea (JORGE) only by RDI criteria. Had visit with Dr. Junior for joint pain, told to take Ralefen (NSAID) prn. She cannot tolerate CPAP mask has nose mask but improper tube. She does feel better though when she uses it. She can think more clearly. She changed Synthroid to 100 mcg.     5/31/21  BP and HR well controlled. She has been not wearing CPAP as much lately and does feels more palpitations/PVCs. She will have an MRI of abd due to possible abd tear or hernia.   ECG - NSR, V rate 72, occ PVCs    9/8/21    I have a question regarding PVCs.  Can I feel the PVCs. The past few days Ive been feeling something different. I was without power until Friday. Now, Im home. I slept with my sleep apnea machine  last night for 9 hours. I needed that. My mind feels clear and rested.     Told her - HI sorry to hear y'all were out of power that long! Yes being off CPAP machine can cause you to have elevated BP and also PVCs. You likely felt those then, but should improve while you keep using CPAP machine. If you want to come in get an ECG and see me that is fine too let me know.   She feels better now, using CPAP for last 3 nights.  ECG - sinus ady Vrate 59    1/28/22  BP and HR stable. She has been taking HCTZ once every other week, has more edema around abd and L knee edema. No CP/SOB.     Patient feels no chest pain, no sob, no PND,  no dizziness, no syncope, no CNS symptoms.    Patient has fairly good exercise tolerance.    Patient is compliant with medications.    1/19/18 ECG - Sinus bradycadia, V rate 48    FH - Grandmother - Stroke in 60s, mult strokes in family    2D ECHO  CONCLUSIONS     1 - No wall motion abnormalities.     2 - Normal left ventricular systolic function (EF 60-65%).     3 - Normal left ventricular diastolic function.     4 - Normal right ventricular systolic function .     5 - The estimated PA systolic pressure is 19 mmHg.     This document has been electronically    SIGNED BY: Beatris Salinas MD On: 01/24/2018 17:35    Past Medical History:   Diagnosis Date    Essential hypertension 1/19/2018    Heart murmur     Obstructive sleep apnea syndrome 8/5/2020       No past surgical history on file.    Social History     Tobacco Use    Smoking status: Never Smoker    Smokeless tobacco: Never Used   Substance Use Topics    Alcohol use: Yes     Comment: occassionally    Drug use: No       Family History   Problem Relation Age of Onset    Hypertension Mother        Patient's Medications   New Prescriptions    No medications on file   Previous Medications    CHOLECALCIFEROL, VITAMIN D3, (VITAMIN D3) 250 MCG (10,000 UNIT) CAP    1 capsule    DICLOFENAC SODIUM/CAPSAICIN (DICLOFENAC-CAPSAICIN TOP)    Apply  topically.    HYDROCHLOROTHIAZIDE (HYDRODIURIL) 12.5 MG TAB    Take 1 tablet (12.5 mg total) by mouth daily as needed (edema).    LEVOTHYROXINE (SYNTHROID) 100 MCG TABLET    1 tablet in the morning on an empty stomach    LINZESS 145 MCG CAP CAPSULE    TK 1 C PO D    LIOTHYRONINE (CYTOMEL) 5 MCG TAB    Take 5 mcg by mouth once daily.     MAGNESIUM SULFATE 100 % CRYSTALS    by Other route.    SARS-COV-2, COVID-19, (MODERNA COVID-19) 100 MCG/0.5 ML INJECTION    Inject 100 mcg into the muscle.    TRIAMCINOLONE ACETONIDE 0.025% (KENALOG) 0.025 % CREAM    APPLY EXTERNALLY TO THE AFFECTED AREA TWICE DAILY AS NEEDED FOR FACIAL RASH OR ITCHING OR FLAKING    VALACYCLOVIR (VALTREX) 500 MG TABLET    Take 500 mg by mouth 2 (two) times daily.   Modified Medications    No medications on file   Discontinued Medications    No medications on file       Review of Systems   Constitutional: Negative.    HENT: Negative.    Eyes: Negative.    Respiratory: Negative.    Cardiovascular: Positive for palpitations.   Gastrointestinal: Negative.    Genitourinary: Negative.    Musculoskeletal: Positive for joint pain.   Skin: Negative.    Neurological: Negative.    Endo/Heme/Allergies: Negative.    Psychiatric/Behavioral: The patient is nervous/anxious.    All 12 systems otherwise negative.      Wt Readings from Last 3 Encounters:   09/08/21 70.8 kg (156 lb)   07/30/21 71 kg (156 lb 8.4 oz)   05/31/21 71.9 kg (158 lb 8.2 oz)     Temp Readings from Last 3 Encounters:   No data found for Temp     BP Readings from Last 3 Encounters:   09/08/21 134/80   07/30/21 120/60   05/31/21 120/70     Pulse Readings from Last 3 Encounters:   09/08/21 62   07/30/21 66   05/31/21 72       There were no vitals taken for this visit.     Objective:   Physical Exam  Constitutional:       General: She is not in acute distress.     Appearance: She is well-developed and well-nourished. She is not diaphoretic.   HENT:      Head: Normocephalic and atraumatic.       Mouth/Throat:      Pharynx: No oropharyngeal exudate.   Eyes:      General: No scleral icterus.        Right eye: No discharge.         Left eye: No discharge.   Pulmonary:      Effort: Pulmonary effort is normal. No respiratory distress.   Skin:     Coloration: Skin is not pale.      Findings: No erythema or rash.   Neurological:      Mental Status: She is alert and oriented to person, place, and time.   Psychiatric:         Mood and Affect: Mood and affect normal.         Behavior: Behavior normal.         Thought Content: Thought content normal.         Judgment: Judgment normal.         Lab Results   Component Value Date     06/23/2020    K 3.7 06/23/2020     06/23/2020    CO2 31 (H) 06/23/2020    BUN 20 06/23/2020    CREATININE 0.8 06/23/2020     06/23/2020    AST 21 06/23/2020    ALT 17 06/23/2020    ALBUMIN 4.4 06/23/2020    PROT 8.7 (H) 06/23/2020    BILITOT 0.3 06/23/2020    WBC 5.58 06/23/2020    HGB 13.0 06/23/2020    HCT 41.0 06/23/2020    MCV 93 06/23/2020     06/23/2020    TSH 0.119 (L) 06/23/2020     Assessment:      1. PVC's (premature ventricular contractions)    2. JORGE on CPAP    3. Essential hypertension    4. Other specified hypothyroidism    5. Sinus bradycardia    6. Palpitations    7. Obstructive sleep apnea syndrome    8. Other sleep apnea        Plan:   1. HTN with palpitations, PVCs noted in past  - stopped BB  - discussed low salt diet  - cont diet/exercise  - on coreg and Norvasc in past  - HCTZ PRN for bloating - improved  - refer to Nutrition    2. Bradycardia, sinus   - cont to monitor  - asymptomatic     3. Hypothyroidism, TSH 0.119  - cont meds per PCP/Endo - changed dose - increased to Synthroid 125 mcg now    4. Joint pain with arm swelling  - f/u rheum   - arm ultrasounds - arterial and venous - negative    5. JORGE  - cont CPAP, will f/u pulm; has new machine    Thank you for allowing me to participate in this patient's care. Please do not hesitate to  contact me with any questions or concerns. Consult note has been forwarded to the referral physician.

## 2022-03-03 ENCOUNTER — OFFICE VISIT (OUTPATIENT)
Dept: OTOLARYNGOLOGY | Facility: CLINIC | Age: 57
End: 2022-03-03
Payer: COMMERCIAL

## 2022-03-03 VITALS
HEART RATE: 80 BPM | SYSTOLIC BLOOD PRESSURE: 128 MMHG | BODY MASS INDEX: 26.47 KG/M2 | TEMPERATURE: 98 F | DIASTOLIC BLOOD PRESSURE: 87 MMHG | WEIGHT: 164 LBS

## 2022-03-03 DIAGNOSIS — J06.9 UPPER RESPIRATORY TRACT INFECTION, UNSPECIFIED TYPE: Primary | ICD-10-CM

## 2022-03-03 DIAGNOSIS — H65.499 CHRONIC OTITIS MEDIA WITH EFFUSION, UNSPECIFIED LATERALITY: ICD-10-CM

## 2022-03-03 PROCEDURE — 99203 OFFICE O/P NEW LOW 30 MIN: CPT | Mod: S$GLB,,, | Performed by: STUDENT IN AN ORGANIZED HEALTH CARE EDUCATION/TRAINING PROGRAM

## 2022-03-03 PROCEDURE — 99999 PR PBB SHADOW E&M-EST. PATIENT-LVL III: CPT | Mod: PBBFAC,,, | Performed by: STUDENT IN AN ORGANIZED HEALTH CARE EDUCATION/TRAINING PROGRAM

## 2022-03-03 PROCEDURE — 3008F PR BODY MASS INDEX (BMI) DOCUMENTED: ICD-10-PCS | Mod: CPTII,S$GLB,, | Performed by: STUDENT IN AN ORGANIZED HEALTH CARE EDUCATION/TRAINING PROGRAM

## 2022-03-03 PROCEDURE — 3074F PR MOST RECENT SYSTOLIC BLOOD PRESSURE < 130 MM HG: ICD-10-PCS | Mod: CPTII,S$GLB,, | Performed by: STUDENT IN AN ORGANIZED HEALTH CARE EDUCATION/TRAINING PROGRAM

## 2022-03-03 PROCEDURE — 1159F PR MEDICATION LIST DOCUMENTED IN MEDICAL RECORD: ICD-10-PCS | Mod: CPTII,S$GLB,, | Performed by: STUDENT IN AN ORGANIZED HEALTH CARE EDUCATION/TRAINING PROGRAM

## 2022-03-03 PROCEDURE — 1159F MED LIST DOCD IN RCRD: CPT | Mod: CPTII,S$GLB,, | Performed by: STUDENT IN AN ORGANIZED HEALTH CARE EDUCATION/TRAINING PROGRAM

## 2022-03-03 PROCEDURE — 3079F PR MOST RECENT DIASTOLIC BLOOD PRESSURE 80-89 MM HG: ICD-10-PCS | Mod: CPTII,S$GLB,, | Performed by: STUDENT IN AN ORGANIZED HEALTH CARE EDUCATION/TRAINING PROGRAM

## 2022-03-03 PROCEDURE — 99999 PR PBB SHADOW E&M-EST. PATIENT-LVL III: ICD-10-PCS | Mod: PBBFAC,,, | Performed by: STUDENT IN AN ORGANIZED HEALTH CARE EDUCATION/TRAINING PROGRAM

## 2022-03-03 PROCEDURE — 3008F BODY MASS INDEX DOCD: CPT | Mod: CPTII,S$GLB,, | Performed by: STUDENT IN AN ORGANIZED HEALTH CARE EDUCATION/TRAINING PROGRAM

## 2022-03-03 PROCEDURE — 99203 PR OFFICE/OUTPT VISIT, NEW, LEVL III, 30-44 MIN: ICD-10-PCS | Mod: S$GLB,,, | Performed by: STUDENT IN AN ORGANIZED HEALTH CARE EDUCATION/TRAINING PROGRAM

## 2022-03-03 PROCEDURE — 3074F SYST BP LT 130 MM HG: CPT | Mod: CPTII,S$GLB,, | Performed by: STUDENT IN AN ORGANIZED HEALTH CARE EDUCATION/TRAINING PROGRAM

## 2022-03-03 PROCEDURE — 3079F DIAST BP 80-89 MM HG: CPT | Mod: CPTII,S$GLB,, | Performed by: STUDENT IN AN ORGANIZED HEALTH CARE EDUCATION/TRAINING PROGRAM

## 2022-03-03 RX ORDER — PREDNISONE 10 MG/1
10 TABLET ORAL DAILY
Qty: 18 TABLET | Refills: 0 | Status: SHIPPED | OUTPATIENT
Start: 2022-03-03 | End: 2023-02-24

## 2022-03-03 NOTE — PROGRESS NOTES
Chief complaint:   Chief Complaint   Patient presents with    Otitis Media     Pt reports pain in left ear. States urgent care diagnosed with fluid in both ears on Monday. Pain radiates to neck. Sent home from work today.        History of Present Illness:     Ms. Frazier is a 56 y.o. female presenting for evaluation of ear pain and congestion.    Onset of this chief complaint was about 5 days ago. Nasal congestion, sneezing. Went to . Dx with fluid in both ears and pharyngitis. Given flonase and doxycycline.     Still having coughing, sneezing, clear rhinorrhea, fatigue, aches, and sweats. Feels ears are drainage.     Monday had negative COVID swab.    history left TMJ. Has splint.       Review of Systems     A complete 10 point ROS was completed and are positive as per above HPI.    Otherwise negative for fever, diplopia, chest pain, shortness of breath, vomiting, blood in urine, joint pain, skin rash, seizures and unusual bleeding.       History        Past Medical History:   Past Medical History:   Diagnosis Date    Essential hypertension 2018    Heart murmur     Obstructive sleep apnea syndrome 2020    Spondylosis of lumbar spine     Thyroid nodule     .          Past Surgical History:  Past Surgical History:   Procedure Laterality Date     SECTION      CHOLECYSTECTOMY      CYSTOSCOPY  2013    PARTIAL HYSTERECTOMY      per patient    THYROID SURGERY     .         Medications: Medication list was reviewed. She  has a current medication list which includes the following prescription(s): cholecalciferol (vitamin d3), diclofenac sodium/capsaicin, hydrochlorothiazide, levothyroxine, linzess, magnesium sulfate, triamcinolone acetonide 0.025%, valacyclovir, prednisone, and sars-cov-2 (covid-19).         Allergies: Review of patient's allergies indicates:  No Known Allergies         Family history: family history includes Hypertension in her mother.         Social History           Alcohol use:  reports current alcohol use.            Tobacco:  reports that she has never smoked. She has never used smokeless tobacco.         Please see the patient's intake form for full details of past medical history, past surgical history, family history, social history and review of systems. ?This information was reviewed by me and noted.      Physical Examination     General: Well developed, well nourished, well hydrated. Verbal with a strong voice and not dysphonic.     Head/Face: Normocephalic, atraumatic. No scars or lesions. Facial musculature equal. Left TMJ tenderness.    Eyes: No scleral icterus or conjunctival hemorrhage. EOMI. PERRLA.     Ears:     · Right ear: No gross deformity. EAC is clear of debris and erythema. The TM is intact with a partial serous effusion    · Left ear: No gross deformity. EAC is clear of debris and erythema. The TM is intact with a pneumatized middle ear. No signs of retraction, fluid or infection.     Nose: No gross deformity or lesions. No purulent discharge. No significant NSD.      Mouth/Oropharynx: Lips without any lesions. No mucosal lesions within the oropharynx. No tonsillar exudate or lesions. Pharyngeal walls symmetrical. Uvula midline. Tongue midline without lesions.     Neck: Trachea midline. No masses. No thyromegaly or nodules palpated.     Lymphatic: No lymphadenopathy in the neck.     Extremities: No cyanosis. Warm and well-perfused.     Skin: No scars or lesions on face or neck.      Neurologic: Moving all extremities without gross abnormality.CN II-XII grossly intact. House-Brackmann 1/6. No signs of nystagmus.      Psych: Alert and oriented to person, place, and time with an appropriate mood and affect.        Assessment     Upper respiratory tract infection, unspecified type   Otitis Media with Effusion     Plan:    Discussed symptomatic tx of URI  Steroid taper for symptomatic relief and help clear effusion  Discussed etiology and natural course  of effusion after URI  Follow up in 6 weeks if ear symptoms persist, sooner if worsening       Jonnathan Roche MD  [facility]   [facility] - [address]  [place] [address]  P: [phone]  F: [phone]

## 2022-06-16 ENCOUNTER — OFFICE VISIT (OUTPATIENT)
Dept: PRIMARY CARE CLINIC | Facility: CLINIC | Age: 57
End: 2022-06-16
Payer: COMMERCIAL

## 2022-06-16 ENCOUNTER — PATIENT MESSAGE (OUTPATIENT)
Dept: PRIMARY CARE CLINIC | Facility: CLINIC | Age: 57
End: 2022-06-16

## 2022-06-16 ENCOUNTER — PATIENT MESSAGE (OUTPATIENT)
Dept: CARDIOLOGY | Facility: CLINIC | Age: 57
End: 2022-06-16
Payer: COMMERCIAL

## 2022-06-16 ENCOUNTER — HOSPITAL ENCOUNTER (OUTPATIENT)
Dept: RADIOLOGY | Facility: HOSPITAL | Age: 57
Discharge: HOME | End: 2022-06-16
Attending: PHYSICIAN ASSISTANT
Payer: COMMERCIAL

## 2022-06-16 VITALS
BODY MASS INDEX: 24.62 KG/M2 | HEIGHT: 67 IN | HEART RATE: 60 BPM | DIASTOLIC BLOOD PRESSURE: 80 MMHG | WEIGHT: 156.88 LBS | SYSTOLIC BLOOD PRESSURE: 118 MMHG

## 2022-06-16 DIAGNOSIS — L65.0 TELOGEN EFFLUVIUM: ICD-10-CM

## 2022-06-16 DIAGNOSIS — U07.1 COVID-19: Primary | ICD-10-CM

## 2022-06-16 DIAGNOSIS — R05.9 COUGH: ICD-10-CM

## 2022-06-16 PROCEDURE — 1159F PR MEDICATION LIST DOCUMENTED IN MEDICAL RECORD: ICD-10-PCS | Mod: CPTII,S$GLB,, | Performed by: PHYSICIAN ASSISTANT

## 2022-06-16 PROCEDURE — 71046 X-RAY EXAM CHEST 2 VIEWS: CPT | Mod: 26,,, | Performed by: RADIOLOGY

## 2022-06-16 PROCEDURE — 3079F PR MOST RECENT DIASTOLIC BLOOD PRESSURE 80-89 MM HG: ICD-10-PCS | Mod: CPTII,S$GLB,, | Performed by: PHYSICIAN ASSISTANT

## 2022-06-16 PROCEDURE — 99999 PR PBB SHADOW E&M-EST. PATIENT-LVL IV: CPT | Mod: PBBFAC,,, | Performed by: PHYSICIAN ASSISTANT

## 2022-06-16 PROCEDURE — 3079F DIAST BP 80-89 MM HG: CPT | Mod: CPTII,S$GLB,, | Performed by: PHYSICIAN ASSISTANT

## 2022-06-16 PROCEDURE — 1160F RVW MEDS BY RX/DR IN RCRD: CPT | Mod: CPTII,S$GLB,, | Performed by: PHYSICIAN ASSISTANT

## 2022-06-16 PROCEDURE — 99999 PR PBB SHADOW E&M-EST. PATIENT-LVL IV: ICD-10-PCS | Mod: PBBFAC,,, | Performed by: PHYSICIAN ASSISTANT

## 2022-06-16 PROCEDURE — 1159F MED LIST DOCD IN RCRD: CPT | Mod: CPTII,S$GLB,, | Performed by: PHYSICIAN ASSISTANT

## 2022-06-16 PROCEDURE — 99213 PR OFFICE/OUTPT VISIT, EST, LEVL III, 20-29 MIN: ICD-10-PCS | Mod: S$GLB,,, | Performed by: PHYSICIAN ASSISTANT

## 2022-06-16 PROCEDURE — 3008F PR BODY MASS INDEX (BMI) DOCUMENTED: ICD-10-PCS | Mod: CPTII,S$GLB,, | Performed by: PHYSICIAN ASSISTANT

## 2022-06-16 PROCEDURE — 3008F BODY MASS INDEX DOCD: CPT | Mod: CPTII,S$GLB,, | Performed by: PHYSICIAN ASSISTANT

## 2022-06-16 PROCEDURE — 99213 OFFICE O/P EST LOW 20 MIN: CPT | Mod: S$GLB,,, | Performed by: PHYSICIAN ASSISTANT

## 2022-06-16 PROCEDURE — 3074F SYST BP LT 130 MM HG: CPT | Mod: CPTII,S$GLB,, | Performed by: PHYSICIAN ASSISTANT

## 2022-06-16 PROCEDURE — 71046 X-RAY EXAM CHEST 2 VIEWS: CPT | Mod: TC

## 2022-06-16 PROCEDURE — 1160F PR REVIEW ALL MEDS BY PRESCRIBER/CLIN PHARMACIST DOCUMENTED: ICD-10-PCS | Mod: CPTII,S$GLB,, | Performed by: PHYSICIAN ASSISTANT

## 2022-06-16 PROCEDURE — 3074F PR MOST RECENT SYSTOLIC BLOOD PRESSURE < 130 MM HG: ICD-10-PCS | Mod: CPTII,S$GLB,, | Performed by: PHYSICIAN ASSISTANT

## 2022-06-16 PROCEDURE — 71046 XR CHEST PA AND LATERAL: ICD-10-PCS | Mod: 26,,, | Performed by: RADIOLOGY

## 2022-06-16 RX ORDER — PROMETHAZINE HYDROCHLORIDE AND DEXTROMETHORPHAN HYDROBROMIDE 6.25; 15 MG/5ML; MG/5ML
5 SYRUP ORAL NIGHTLY PRN
Qty: 118 ML | Refills: 0 | Status: SHIPPED | OUTPATIENT
Start: 2022-06-16 | End: 2022-06-26

## 2022-06-16 RX ORDER — LEVOTHYROXINE SODIUM 125 UG/1
TABLET ORAL
COMMUNITY
Start: 2022-03-08

## 2022-06-16 RX ORDER — AZITHROMYCIN 250 MG/1
250 TABLET, FILM COATED ORAL DAILY
Qty: 6 TABLET | Refills: 0 | Status: SHIPPED | OUTPATIENT
Start: 2022-06-16 | End: 2022-06-21

## 2022-06-16 NOTE — PROGRESS NOTES
"Subjective:      Patient ID: Kayleigh Frazier is a 57 y.o. female.    Chief Complaint: COVID test (June 2nd DX with COVID, still has a cough. Needs to get a COVID test to return to work, also needs note stating ok to return to work), Cough, and Fatigue    Kayleigh Frazier is a 57 y.o. female who presents to clinic for     Positive covid test June 2nd   Iniitally seen at PCP's office and tested positive there.  At that time, throat was on fire, fever.  Was given shot there - was told had lot of pus in throat.    Took tylenol and lots of fluids  Had a lot of coughing  Fever improved after a couple of days   Had SOB for 3 days.  - had albuterol sent in   Loss sense of taste - has gotten better   Everything but fatigue has resolved.  Except today, cough is different - feels like something is in my throat   Started taking mucinex   Chest hurts     Fatigue  Associated symptoms include coughing and fatigue. Pertinent negatives include no abdominal pain, chest pain, nausea, rash or vomiting. Fever: improved. Sore throat: improved.     Review of Systems   Constitutional: Positive for fatigue. Fever: improved.        Hair loss   HENT: Sore throat: improved.    Respiratory: Positive for cough. Shortness of breath: improved     Cardiovascular: Negative for chest pain and palpitations.   Gastrointestinal: Negative for abdominal pain, diarrhea, nausea and vomiting.   Skin: Negative for rash.       Objective:   /80   Pulse 60   Ht 5' 6.5" (1.689 m)   Wt 71.2 kg (156 lb 13.7 oz)   BMI 24.94 kg/m²   Physical Exam  Vitals reviewed.   Constitutional:       General: She is not in acute distress.     Appearance: She is well-developed. She is not ill-appearing, toxic-appearing or diaphoretic.   HENT:      Head: Normocephalic and atraumatic.      Right Ear: External ear normal.      Left Ear: External ear normal.      Nose: Nose normal.   Cardiovascular:      Rate and Rhythm: Normal rate and regular rhythm.      Pulses:    "        Radial pulses are 2+ on the right side and 2+ on the left side.      Heart sounds: Normal heart sounds, S1 normal and S2 normal.   Pulmonary:      Effort: Pulmonary effort is normal. No tachypnea, bradypnea, accessory muscle usage or respiratory distress.      Breath sounds: Normal breath sounds. No decreased breath sounds, wheezing, rhonchi or rales.   Chest:      Chest wall: No tenderness.   Skin:     General: Skin is warm and dry.      Capillary Refill: Capillary refill takes less than 2 seconds.   Neurological:      Mental Status: She is alert and oriented to person, place, and time. She is not disoriented.      Cranial Nerves: No cranial nerve deficit.      Sensory: No sensory deficit.      Motor: No abnormal muscle tone.   Psychiatric:         Behavior: Behavior normal.       Assessment:      1. COVID-19    2. Telogen effluvium    3. Cough       Plan:   COVID-19  Comments:  discussed fatigue can take some time to improve, discussed taking it easy, note for work provided   Orders:  -     Cancel: POCT COVID-19 Rapid Screening; Future; Expected date: 06/16/2022    Telogen effluvium  Comments:  stress induced hair loss from covid infection - discussed biotin as well as reducing/stress     Cough  Comments:  lung exam normal in clinic, CXR to rule out pneumonia based on symptoms, delayed antibiotic if not improving in next few days, night time cough syrup   Orders:  -     promethazine-dextromethorphan (PROMETHAZINE-DM) 6.25-15 mg/5 mL Syrp; Take 5 mLs by mouth nightly as needed.  Dispense: 118 mL; Refill: 0  -     X-Ray Chest PA And Lateral; Future; Expected date: 06/16/2022  -     azithromycin (Z-GRANT) 250 MG tablet; Take 1 tablet (250 mg total) by mouth once daily. Take 2 tablets by mouth on day 1, then one tablet daily on days 2-5. for 5 days  Dispense: 6 tablet; Refill: 0          Felicitas Frost PA-C   Physician Assistant   [facility]

## 2022-06-16 NOTE — LETTER
[id number] [facility] ? [place], [address] ? Phone [phone] ? Fax [phone]           Return to Work/School    Patient: Kayleigh Frazier  YOB: 1965   Date: 06/16/2022      To Whom It May Concern:     Kayleigh Frazier was in contact with/seen in my office on 06/16/2022. COVID-19 is present in our communities across the [facility]. Not all patients are eligible or appropriate to be tested. In this situation, your employee meets the following criteria:     Kayleigh Frazier has met the criteria for COVID-19 testing and has a POSITIVE result.  Patient reports positive results on 6/2/22. She can return to work once they are asymptomatic for 24 hours without the use of fever reducing medications AND at least five days from the start of symptoms (or from the first positive result if they have no symptoms).  Patient was seen by me today, 6/16.  Patient meets criteria of 5 days from the start of symptoms, but she is having a residual cough and fatigue at this time.  I recommend she return to work on Monday, 6/20/22.       If you have any questions or concerns, or if I can be of further assistance, please do not hesitate to contact me.     Sincerely,          Felicitas Frost PA-C        
no

## 2022-07-14 ENCOUNTER — PATIENT MESSAGE (OUTPATIENT)
Dept: CARDIOLOGY | Facility: CLINIC | Age: 57
End: 2022-07-14
Payer: COMMERCIAL

## 2022-07-21 DIAGNOSIS — I49.3 PVC'S (PREMATURE VENTRICULAR CONTRACTIONS): Primary | ICD-10-CM

## 2022-08-10 ENCOUNTER — PATIENT MESSAGE (OUTPATIENT)
Dept: CARDIOLOGY | Facility: CLINIC | Age: 57
End: 2022-08-10
Payer: COMMERCIAL

## 2022-08-10 ENCOUNTER — PATIENT MESSAGE (OUTPATIENT)
Dept: PULMONOLOGY | Facility: CLINIC | Age: 57
End: 2022-08-10
Payer: COMMERCIAL

## 2022-08-11 ENCOUNTER — OFFICE VISIT (OUTPATIENT)
Dept: SLEEP MEDICINE | Facility: CLINIC | Age: 57
End: 2022-08-11
Payer: COMMERCIAL

## 2022-08-11 VITALS
DIASTOLIC BLOOD PRESSURE: 78 MMHG | HEIGHT: 67 IN | HEART RATE: 68 BPM | BODY MASS INDEX: 24.99 KG/M2 | SYSTOLIC BLOOD PRESSURE: 122 MMHG | WEIGHT: 159.19 LBS | OXYGEN SATURATION: 99 % | RESPIRATION RATE: 16 BRPM

## 2022-08-11 DIAGNOSIS — G47.33 OSA ON CPAP: Primary | ICD-10-CM

## 2022-08-11 DIAGNOSIS — I10 ESSENTIAL HYPERTENSION: ICD-10-CM

## 2022-08-11 DIAGNOSIS — E89.0 POSTOPERATIVE HYPOTHYROIDISM: ICD-10-CM

## 2022-08-11 PROCEDURE — 1160F PR REVIEW ALL MEDS BY PRESCRIBER/CLIN PHARMACIST DOCUMENTED: ICD-10-PCS | Mod: CPTII,S$GLB,, | Performed by: INTERNAL MEDICINE

## 2022-08-11 PROCEDURE — 99999 PR PBB SHADOW E&M-EST. PATIENT-LVL IV: CPT | Mod: PBBFAC,,, | Performed by: INTERNAL MEDICINE

## 2022-08-11 PROCEDURE — 99214 OFFICE O/P EST MOD 30 MIN: CPT | Mod: S$GLB,,, | Performed by: INTERNAL MEDICINE

## 2022-08-11 PROCEDURE — 3078F PR MOST RECENT DIASTOLIC BLOOD PRESSURE < 80 MM HG: ICD-10-PCS | Mod: CPTII,S$GLB,, | Performed by: INTERNAL MEDICINE

## 2022-08-11 PROCEDURE — 1159F MED LIST DOCD IN RCRD: CPT | Mod: CPTII,S$GLB,, | Performed by: INTERNAL MEDICINE

## 2022-08-11 PROCEDURE — 3008F PR BODY MASS INDEX (BMI) DOCUMENTED: ICD-10-PCS | Mod: CPTII,S$GLB,, | Performed by: INTERNAL MEDICINE

## 2022-08-11 PROCEDURE — 1159F PR MEDICATION LIST DOCUMENTED IN MEDICAL RECORD: ICD-10-PCS | Mod: CPTII,S$GLB,, | Performed by: INTERNAL MEDICINE

## 2022-08-11 PROCEDURE — 3008F BODY MASS INDEX DOCD: CPT | Mod: CPTII,S$GLB,, | Performed by: INTERNAL MEDICINE

## 2022-08-11 PROCEDURE — 99999 PR PBB SHADOW E&M-EST. PATIENT-LVL IV: ICD-10-PCS | Mod: PBBFAC,,, | Performed by: INTERNAL MEDICINE

## 2022-08-11 PROCEDURE — 3074F SYST BP LT 130 MM HG: CPT | Mod: CPTII,S$GLB,, | Performed by: INTERNAL MEDICINE

## 2022-08-11 PROCEDURE — 3078F DIAST BP <80 MM HG: CPT | Mod: CPTII,S$GLB,, | Performed by: INTERNAL MEDICINE

## 2022-08-11 PROCEDURE — 3074F PR MOST RECENT SYSTOLIC BLOOD PRESSURE < 130 MM HG: ICD-10-PCS | Mod: CPTII,S$GLB,, | Performed by: INTERNAL MEDICINE

## 2022-08-11 PROCEDURE — 99214 PR OFFICE/OUTPT VISIT, EST, LEVL IV, 30-39 MIN: ICD-10-PCS | Mod: S$GLB,,, | Performed by: INTERNAL MEDICINE

## 2022-08-11 PROCEDURE — 1160F RVW MEDS BY RX/DR IN RCRD: CPT | Mod: CPTII,S$GLB,, | Performed by: INTERNAL MEDICINE

## 2022-08-11 NOTE — ASSESSMENT & PLAN NOTE
[place] 9  APAP 4-20  Has new dream station 2  When used feels better.  Poorly adherent  Usage > 4 hrs : 20%  AHI 2.1      See DME for new mask  Change to CPAP 7 cm

## 2022-08-11 NOTE — PROGRESS NOTES
Subjective:      Patient ID: Kayleigh Frazier is a 57 y.o. female.    Patient Active Problem List   Diagnosis    Essential hypertension    Other specified hypothyroidism    PVC's (premature ventricular contractions)    JORGE on CPAP    Irritable bowel syndrome    Major depression, single episode    Postoperative hypothyroidism     Problem list has been reviewed.      Chief Complaint: Apnea            HPI:   Kayleigh Frazier is 57 y.o.  Mild JORGE:   Follow up for JORGE and CPAP complaince assessment.  She  is on APAP  of 4 - 20 CMWP .   She definitely thinks PAP is beneficial to her health and she wants to continue with PAP therapy.  Patient denies snoring, headaches, excessive daytime sleepiness  HSAT had Mild JORGE     Usage > 4 hrs was 20.0/hr  Average AHI 2.1  APAP 4-20      Previous Report Reviewed: lab reports, office notes and radiology reports     The following portions of the patient's history were reviewed and updated as appropriate: allergies, current medications, past family history, past medical history, past social history, past surgical history and problem list.    Review of Systems   Constitutional: Negative for fever, chills, activity change, appetite change and fatigue.   HENT: Negative for postnasal drip, rhinorrhea, sinus pressure, trouble swallowing, voice change, congestion and hearing loss.    Respiratory: Negative for apnea, cough, hemoptysis, sputum production, choking, chest tightness, shortness of breath, wheezing, orthopnea, previous hospitialization due to pulmonary problems, asthma nighttime symptoms and use of rescue inhaler.    Cardiovascular: Negative for chest pain, palpitations and leg swelling.   Genitourinary: Negative for difficulty urinating and hematuria.   Endocrine: Negative for cold intolerance and heat intolerance.    Musculoskeletal: Negative for back pain, gait problem and joint swelling.   Skin: Negative for rash.   Gastrointestinal: Negative for abdominal pain and  "abdominal distention.   Neurological: Negative for dizziness, weakness and headaches.   Hematological: Negative for adenopathy.   Psychiatric/Behavioral: Negative for sleep disturbance. The patient is not nervous/anxious.         Objective:     Vitals:    08/11/22 1151   BP: 122/78   Pulse: 68   Resp: 16   SpO2: 99%   Weight: 72.2 kg (159 lb 2.8 oz)   Height: 5' 6.5" (1.689 m)     body mass index is 25.31 kg/m².    Physical Exam  Vitals and nursing note reviewed.   HENT:      Head: Normocephalic and atraumatic.      Nose: Nose normal.      Mouth/Throat:      Mouth: Mucous membranes are dry.   Eyes:      Extraocular Movements: Extraocular movements intact.      Pupils: Pupils are equal, round, and reactive to light.   Cardiovascular:      Rate and Rhythm: Normal rate and regular rhythm.   Pulmonary:      Effort: Pulmonary effort is normal.      Breath sounds: Normal breath sounds.   Abdominal:      General: Abdomen is flat.      Palpations: Abdomen is soft.   Musculoskeletal:      Cervical back: Normal range of motion and neck supple.   Skin:     General: Skin is warm and dry.      Capillary Refill: Capillary refill takes less than 2 seconds.   Neurological:      General: No focal deficit present.      Mental Status: She is alert and oriented to person, place, and time.   Psychiatric:         Mood and Affect: Mood normal.         Behavior: Behavior normal.         Personal Diagnostic Review  CPAP complaince download.   Andreea Ornelas [facility]. [address], [place] [address] [phone]  Compliance Information 6/22/2022 - 7/21/2022  Compliance Summary  6/22/2022 - 7/21/2022 (30 days)  Days with Device Usage 7 days  Days without Device Usage 23 days  Percent Days with Device Usage 23.3%  Cumulative Usage 1 day 18 hrs. 6 mins. 22 secs.  Maximum Usage (1 Day) 8 hrs. 21 mins. 26 secs.  Average Usage (All Days) 1 hrs. 24 mins. 12 secs.  Average Usage (Days Used) 6 hrs. 54 secs.  Minimum Usage " (1 Day) 49 secs.  Percent of Days with Usage >= 4 Hours 20.0%  Percent of Days with Usage < 4 Hours 80.0%  Date Range  Total Blower Time 1 day 21 hrs. 17 mins. 8 secs.  Average AHI 2.1  Auto-CPAP Summary  Auto-CPAP Mean Pressure 6.3 cmH2O  Auto-CPAP Peak Average Pressure 7.7 cmH2O  Device Pressure <= 90% of Time 8.9 cmH2O  Average Time in Large Leak Per Day 9 secs.  Assessment / plan     Discussed diagnosis, its evaluation, treatment and usual course. All questions answered.    Problem List Items Addressed This Visit     Postoperative hypothyroidism    Essential hypertension    Current Assessment & Plan     Stable on HCTZ           JORGE on CPAP - Primary    Current Assessment & Plan     [place] 9  APAP 4-20  Has new dream station 2  When used feels better.  Poorly adherent  Usage > 4 hrs : 20%  AHI 2.1      See DME for new mask  Change to CPAP 7 cm           Relevant Orders    CPAP/BIPAP SUPPLIES    HME - OTHER        The American College of Physicians has released guidelines on treating obstructive sleep apnea. The recommendations, published in the Annals of Internal Medicine, include the following:    Weight loss should be encouraged for all overweight and obese patients with obstructive sleep apnea.    Continuous positive airway pressure (CPAP) is recommended as a first-line treatment.  Mandibular advancement devices (Zhane) are recommended for patients who experience adverse effects with CPAP treatment, those who don't adhere to CPAP, or those who simply prefer Zhane.    Current evidence does not support drug therapy for sleep apnea. Evidence is also lacking for the benefits of surgery, so it shouldn't be used as first-line treatment    Treatment Options for Sleep Disordered Breathing discussed:     [x]    Continuous Positive Airway Pressure (CPAP).  []    Surgical options  [x]    Oral appliances   []    Behavioral approaches.   []    Weight loss.   []    Avoiding alcohol and sedative medication.  []    Treat other  underlying medical conditions eg. nasal allergies  []     INSPIRE  []     Provent/Theravent      TIME SPENT WITH PATIENT: Time spent: 25 minutes in face to face  discussion concerning diagnosis, prognosis, review of lab and test results, benefits of treatment as well as management of disease, counseling of patient and coordination of care between various health  care providers . Greater than half the time spent was used for coordination of care and counseling of patient.     Follow up in about 6 months (around 2/11/2023), or change pressure to 7 cm, New supplies, improve adherence.

## 2022-08-12 ENCOUNTER — HOSPITAL ENCOUNTER (OUTPATIENT)
Dept: CARDIOLOGY | Facility: HOSPITAL | Age: 57
Discharge: HOME | End: 2022-08-12
Attending: INTERNAL MEDICINE
Payer: COMMERCIAL

## 2022-08-12 ENCOUNTER — OFFICE VISIT (OUTPATIENT)
Dept: CARDIOLOGY | Facility: CLINIC | Age: 57
End: 2022-08-12
Payer: COMMERCIAL

## 2022-08-12 VITALS
SYSTOLIC BLOOD PRESSURE: 140 MMHG | HEIGHT: 66 IN | WEIGHT: 159.81 LBS | DIASTOLIC BLOOD PRESSURE: 78 MMHG | BODY MASS INDEX: 25.68 KG/M2 | HEART RATE: 59 BPM

## 2022-08-12 DIAGNOSIS — G47.33 OBSTRUCTIVE SLEEP APNEA SYNDROME: ICD-10-CM

## 2022-08-12 DIAGNOSIS — I49.3 PVC'S (PREMATURE VENTRICULAR CONTRACTIONS): Primary | ICD-10-CM

## 2022-08-12 DIAGNOSIS — I10 ESSENTIAL HYPERTENSION: ICD-10-CM

## 2022-08-12 DIAGNOSIS — R00.1 SINUS BRADYCARDIA: ICD-10-CM

## 2022-08-12 DIAGNOSIS — E03.8 OTHER SPECIFIED HYPOTHYROIDISM: ICD-10-CM

## 2022-08-12 DIAGNOSIS — I49.3 PVC'S (PREMATURE VENTRICULAR CONTRACTIONS): ICD-10-CM

## 2022-08-12 DIAGNOSIS — Z86.16 HISTORY OF COVID-19: ICD-10-CM

## 2022-08-12 DIAGNOSIS — M54.2 NECK PAIN: ICD-10-CM

## 2022-08-12 DIAGNOSIS — G47.33 OSA ON CPAP: ICD-10-CM

## 2022-08-12 DIAGNOSIS — G47.39 OTHER SLEEP APNEA: ICD-10-CM

## 2022-08-12 DIAGNOSIS — R00.2 PALPITATIONS: ICD-10-CM

## 2022-08-12 PROCEDURE — 1160F PR REVIEW ALL MEDS BY PRESCRIBER/CLIN PHARMACIST DOCUMENTED: ICD-10-PCS | Mod: CPTII,S$GLB,, | Performed by: INTERNAL MEDICINE

## 2022-08-12 PROCEDURE — 3008F BODY MASS INDEX DOCD: CPT | Mod: CPTII,S$GLB,, | Performed by: INTERNAL MEDICINE

## 2022-08-12 PROCEDURE — 99214 OFFICE O/P EST MOD 30 MIN: CPT | Mod: S$GLB,,, | Performed by: INTERNAL MEDICINE

## 2022-08-12 PROCEDURE — 3078F DIAST BP <80 MM HG: CPT | Mod: CPTII,S$GLB,, | Performed by: INTERNAL MEDICINE

## 2022-08-12 PROCEDURE — 99214 PR OFFICE/OUTPT VISIT, EST, LEVL IV, 30-39 MIN: ICD-10-PCS | Mod: S$GLB,,, | Performed by: INTERNAL MEDICINE

## 2022-08-12 PROCEDURE — 99999 PR PBB SHADOW E&M-EST. PATIENT-LVL IV: ICD-10-PCS | Mod: PBBFAC,,, | Performed by: INTERNAL MEDICINE

## 2022-08-12 PROCEDURE — 3077F PR MOST RECENT SYSTOLIC BLOOD PRESSURE >= 140 MM HG: ICD-10-PCS | Mod: CPTII,S$GLB,, | Performed by: INTERNAL MEDICINE

## 2022-08-12 PROCEDURE — 3078F PR MOST RECENT DIASTOLIC BLOOD PRESSURE < 80 MM HG: ICD-10-PCS | Mod: CPTII,S$GLB,, | Performed by: INTERNAL MEDICINE

## 2022-08-12 PROCEDURE — 93005 ELECTROCARDIOGRAM TRACING: CPT

## 2022-08-12 PROCEDURE — 93010 ELECTROCARDIOGRAM REPORT: CPT | Mod: ,,, | Performed by: STUDENT IN AN ORGANIZED HEALTH CARE EDUCATION/TRAINING PROGRAM

## 2022-08-12 PROCEDURE — 1160F RVW MEDS BY RX/DR IN RCRD: CPT | Mod: CPTII,S$GLB,, | Performed by: INTERNAL MEDICINE

## 2022-08-12 PROCEDURE — 3008F PR BODY MASS INDEX (BMI) DOCUMENTED: ICD-10-PCS | Mod: CPTII,S$GLB,, | Performed by: INTERNAL MEDICINE

## 2022-08-12 PROCEDURE — 3077F SYST BP >= 140 MM HG: CPT | Mod: CPTII,S$GLB,, | Performed by: INTERNAL MEDICINE

## 2022-08-12 PROCEDURE — 93010 EKG 12-LEAD: ICD-10-PCS | Mod: ,,, | Performed by: STUDENT IN AN ORGANIZED HEALTH CARE EDUCATION/TRAINING PROGRAM

## 2022-08-12 PROCEDURE — 1159F MED LIST DOCD IN RCRD: CPT | Mod: CPTII,S$GLB,, | Performed by: INTERNAL MEDICINE

## 2022-08-12 PROCEDURE — 99999 PR PBB SHADOW E&M-EST. PATIENT-LVL IV: CPT | Mod: PBBFAC,,, | Performed by: INTERNAL MEDICINE

## 2022-08-12 PROCEDURE — 1159F PR MEDICATION LIST DOCUMENTED IN MEDICAL RECORD: ICD-10-PCS | Mod: CPTII,S$GLB,, | Performed by: INTERNAL MEDICINE

## 2022-08-12 RX ORDER — HYDROCHLOROTHIAZIDE 12.5 MG/1
12.5 TABLET ORAL DAILY PRN
Qty: 90 TABLET | Refills: 1 | Status: SHIPPED | OUTPATIENT
Start: 2022-08-12 | End: 2024-03-27
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
## 2022-08-15 ENCOUNTER — TELEPHONE (OUTPATIENT)
Dept: PAIN MEDICINE | Facility: CLINIC | Age: 57
End: 2022-08-15
Payer: COMMERCIAL

## 2022-08-16 ENCOUNTER — TELEPHONE (OUTPATIENT)
Dept: PAIN MEDICINE | Facility: CLINIC | Age: 57
End: 2022-08-16
Payer: COMMERCIAL

## 2022-08-17 ENCOUNTER — TELEPHONE (OUTPATIENT)
Dept: PAIN MEDICINE | Facility: CLINIC | Age: 57
End: 2022-08-17
Payer: COMMERCIAL

## 2023-01-28 NOTE — ASSESSMENT & PLAN NOTE
My recommendation at this point would be to set up a [facility].  We have discussed weight loss and how this may improve her situation.  We have discussed behavioral modifications, as well.  After her study, she  could certainly try a CPAP.    Verbal/written post procedure instructions were given to patient/caregiver.

## 2023-02-20 DIAGNOSIS — I49.3 PVC'S (PREMATURE VENTRICULAR CONTRACTIONS): Primary | ICD-10-CM

## 2023-02-24 ENCOUNTER — OFFICE VISIT (OUTPATIENT)
Dept: CARDIOLOGY | Facility: CLINIC | Age: 58
End: 2023-02-24
Payer: COMMERCIAL

## 2023-02-24 ENCOUNTER — HOSPITAL ENCOUNTER (OUTPATIENT)
Dept: CARDIOLOGY | Facility: HOSPITAL | Age: 58
Discharge: HOME | End: 2023-02-24
Attending: INTERNAL MEDICINE
Payer: COMMERCIAL

## 2023-02-24 VITALS
SYSTOLIC BLOOD PRESSURE: 132 MMHG | BODY MASS INDEX: 26.12 KG/M2 | WEIGHT: 161.81 LBS | OXYGEN SATURATION: 97 % | BODY MASS INDEX: 26.12 KG/M2 | HEART RATE: 56 BPM | WEIGHT: 161.81 LBS | DIASTOLIC BLOOD PRESSURE: 84 MMHG

## 2023-02-24 DIAGNOSIS — I10 ESSENTIAL HYPERTENSION: ICD-10-CM

## 2023-02-24 DIAGNOSIS — R00.2 PALPITATIONS: ICD-10-CM

## 2023-02-24 DIAGNOSIS — G47.39 OTHER SLEEP APNEA: ICD-10-CM

## 2023-02-24 DIAGNOSIS — E03.9 HYPOTHYROIDISM, UNSPECIFIED TYPE: ICD-10-CM

## 2023-02-24 DIAGNOSIS — I49.3 PVC'S (PREMATURE VENTRICULAR CONTRACTIONS): Primary | ICD-10-CM

## 2023-02-24 DIAGNOSIS — R00.1 SINUS BRADYCARDIA: ICD-10-CM

## 2023-02-24 DIAGNOSIS — G47.33 OBSTRUCTIVE SLEEP APNEA SYNDROME: ICD-10-CM

## 2023-02-24 DIAGNOSIS — E03.8 OTHER SPECIFIED HYPOTHYROIDISM: ICD-10-CM

## 2023-02-24 DIAGNOSIS — I49.3 PVC'S (PREMATURE VENTRICULAR CONTRACTIONS): ICD-10-CM

## 2023-02-24 DIAGNOSIS — Z86.16 HISTORY OF COVID-19: ICD-10-CM

## 2023-02-24 DIAGNOSIS — G47.33 OSA ON CPAP: ICD-10-CM

## 2023-02-24 PROCEDURE — 1159F PR MEDICATION LIST DOCUMENTED IN MEDICAL RECORD: ICD-10-PCS | Mod: CPTII,S$GLB,, | Performed by: INTERNAL MEDICINE

## 2023-02-24 PROCEDURE — 3079F PR MOST RECENT DIASTOLIC BLOOD PRESSURE 80-89 MM HG: ICD-10-PCS | Mod: CPTII,S$GLB,, | Performed by: INTERNAL MEDICINE

## 2023-02-24 PROCEDURE — 93010 EKG 12-LEAD: ICD-10-PCS | Mod: ,,, | Performed by: INTERNAL MEDICINE

## 2023-02-24 PROCEDURE — 99999 PR PBB SHADOW E&M-EST. PATIENT-LVL IV: ICD-10-PCS | Mod: PBBFAC,,, | Performed by: INTERNAL MEDICINE

## 2023-02-24 PROCEDURE — 3075F SYST BP GE 130 - 139MM HG: CPT | Mod: CPTII,S$GLB,, | Performed by: INTERNAL MEDICINE

## 2023-02-24 PROCEDURE — 99999 PR PBB SHADOW E&M-EST. PATIENT-LVL IV: CPT | Mod: PBBFAC,,, | Performed by: INTERNAL MEDICINE

## 2023-02-24 PROCEDURE — 3008F BODY MASS INDEX DOCD: CPT | Mod: CPTII,S$GLB,, | Performed by: INTERNAL MEDICINE

## 2023-02-24 PROCEDURE — 93005 ELECTROCARDIOGRAM TRACING: CPT

## 2023-02-24 PROCEDURE — 3008F PR BODY MASS INDEX (BMI) DOCUMENTED: ICD-10-PCS | Mod: CPTII,S$GLB,, | Performed by: INTERNAL MEDICINE

## 2023-02-24 PROCEDURE — 3079F DIAST BP 80-89 MM HG: CPT | Mod: CPTII,S$GLB,, | Performed by: INTERNAL MEDICINE

## 2023-02-24 PROCEDURE — 1160F PR REVIEW ALL MEDS BY PRESCRIBER/CLIN PHARMACIST DOCUMENTED: ICD-10-PCS | Mod: CPTII,S$GLB,, | Performed by: INTERNAL MEDICINE

## 2023-02-24 PROCEDURE — 99214 OFFICE O/P EST MOD 30 MIN: CPT | Mod: S$GLB,,, | Performed by: INTERNAL MEDICINE

## 2023-02-24 PROCEDURE — 1160F RVW MEDS BY RX/DR IN RCRD: CPT | Mod: CPTII,S$GLB,, | Performed by: INTERNAL MEDICINE

## 2023-02-24 PROCEDURE — 99214 PR OFFICE/OUTPT VISIT, EST, LEVL IV, 30-39 MIN: ICD-10-PCS | Mod: S$GLB,,, | Performed by: INTERNAL MEDICINE

## 2023-02-24 PROCEDURE — 3075F PR MOST RECENT SYSTOLIC BLOOD PRESS GE 130-139MM HG: ICD-10-PCS | Mod: CPTII,S$GLB,, | Performed by: INTERNAL MEDICINE

## 2023-02-24 PROCEDURE — 93010 ELECTROCARDIOGRAM REPORT: CPT | Mod: ,,, | Performed by: INTERNAL MEDICINE

## 2023-02-24 PROCEDURE — 1159F MED LIST DOCD IN RCRD: CPT | Mod: CPTII,S$GLB,, | Performed by: INTERNAL MEDICINE
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
## 2023-03-02 ENCOUNTER — PATIENT MESSAGE (OUTPATIENT)
Dept: CARDIOLOGY | Facility: CLINIC | Age: 58
End: 2023-03-02
Payer: COMMERCIAL

## 2023-05-17 DIAGNOSIS — I49.3 PVCS (PREMATURE VENTRICULAR CONTRACTIONS): Primary | ICD-10-CM

## 2023-06-02 ENCOUNTER — PATIENT MESSAGE (OUTPATIENT)
Dept: CARDIOLOGY | Facility: CLINIC | Age: 58
End: 2023-06-02
Payer: COMMERCIAL

## 2023-06-13 ENCOUNTER — PATIENT MESSAGE (OUTPATIENT)
Dept: CARDIOLOGY | Facility: CLINIC | Age: 58
End: 2023-06-13
Payer: COMMERCIAL

## 2023-06-14 ENCOUNTER — HOSPITAL ENCOUNTER (OUTPATIENT)
Dept: CARDIOLOGY | Facility: HOSPITAL | Age: 58
Discharge: HOME | End: 2023-06-14
Attending: INTERNAL MEDICINE
Payer: COMMERCIAL

## 2023-06-14 DIAGNOSIS — I49.3 PVCS (PREMATURE VENTRICULAR CONTRACTIONS): ICD-10-CM

## 2023-06-14 PROCEDURE — 93227 XTRNL ECG REC<48 HR R&I: CPT | Mod: ,,, | Performed by: INTERNAL MEDICINE

## 2023-06-14 PROCEDURE — 93227 HOLTER MONITOR - 48 HOUR (CUPID ONLY): ICD-10-PCS | Mod: ,,, | Performed by: INTERNAL MEDICINE

## 2023-06-14 PROCEDURE — 93226 XTRNL ECG REC<48 HR SCAN A/R: CPT

## 2023-06-16 ENCOUNTER — HOSPITAL ENCOUNTER (OUTPATIENT)
Dept: CARDIOLOGY | Facility: HOSPITAL | Age: 58
Discharge: HOME | End: 2023-06-16
Attending: INTERNAL MEDICINE
Payer: COMMERCIAL

## 2023-06-16 ENCOUNTER — OFFICE VISIT (OUTPATIENT)
Dept: CARDIOLOGY | Facility: CLINIC | Age: 58
End: 2023-06-16
Payer: COMMERCIAL

## 2023-06-16 VITALS
DIASTOLIC BLOOD PRESSURE: 84 MMHG | HEIGHT: 66 IN | OXYGEN SATURATION: 99 % | HEART RATE: 52 BPM | SYSTOLIC BLOOD PRESSURE: 124 MMHG | BODY MASS INDEX: 25.51 KG/M2 | WEIGHT: 158.75 LBS

## 2023-06-16 DIAGNOSIS — R00.2 PALPITATIONS: Primary | ICD-10-CM

## 2023-06-16 DIAGNOSIS — G47.33 OSA ON CPAP: ICD-10-CM

## 2023-06-16 DIAGNOSIS — R00.2 PALPITATIONS: ICD-10-CM

## 2023-06-16 DIAGNOSIS — I10 ESSENTIAL HYPERTENSION: ICD-10-CM

## 2023-06-16 DIAGNOSIS — I49.3 PVCS (PREMATURE VENTRICULAR CONTRACTIONS): Primary | ICD-10-CM

## 2023-06-16 DIAGNOSIS — K58.9 IRRITABLE BOWEL SYNDROME, UNSPECIFIED TYPE: ICD-10-CM

## 2023-06-16 PROCEDURE — 3079F PR MOST RECENT DIASTOLIC BLOOD PRESSURE 80-89 MM HG: ICD-10-PCS | Mod: CPTII,S$GLB,, | Performed by: INTERNAL MEDICINE

## 2023-06-16 PROCEDURE — 93010 ELECTROCARDIOGRAM REPORT: CPT | Mod: ,,, | Performed by: INTERNAL MEDICINE

## 2023-06-16 PROCEDURE — 93010 EKG 12-LEAD: ICD-10-PCS | Mod: ,,, | Performed by: INTERNAL MEDICINE

## 2023-06-16 PROCEDURE — 99205 PR OFFICE/OUTPT VISIT, NEW, LEVL V, 60-74 MIN: ICD-10-PCS | Mod: S$GLB,,, | Performed by: INTERNAL MEDICINE

## 2023-06-16 PROCEDURE — 1159F PR MEDICATION LIST DOCUMENTED IN MEDICAL RECORD: ICD-10-PCS | Mod: CPTII,S$GLB,, | Performed by: INTERNAL MEDICINE

## 2023-06-16 PROCEDURE — 3008F BODY MASS INDEX DOCD: CPT | Mod: CPTII,S$GLB,, | Performed by: INTERNAL MEDICINE

## 2023-06-16 PROCEDURE — 3008F PR BODY MASS INDEX (BMI) DOCUMENTED: ICD-10-PCS | Mod: CPTII,S$GLB,, | Performed by: INTERNAL MEDICINE

## 2023-06-16 PROCEDURE — 3074F PR MOST RECENT SYSTOLIC BLOOD PRESSURE < 130 MM HG: ICD-10-PCS | Mod: CPTII,S$GLB,, | Performed by: INTERNAL MEDICINE

## 2023-06-16 PROCEDURE — 3074F SYST BP LT 130 MM HG: CPT | Mod: CPTII,S$GLB,, | Performed by: INTERNAL MEDICINE

## 2023-06-16 PROCEDURE — 1159F MED LIST DOCD IN RCRD: CPT | Mod: CPTII,S$GLB,, | Performed by: INTERNAL MEDICINE

## 2023-06-16 PROCEDURE — 99205 OFFICE O/P NEW HI 60 MIN: CPT | Mod: S$GLB,,, | Performed by: INTERNAL MEDICINE

## 2023-06-16 PROCEDURE — 99999 PR PBB SHADOW E&M-EST. PATIENT-LVL IV: ICD-10-PCS | Mod: PBBFAC,,, | Performed by: INTERNAL MEDICINE

## 2023-06-16 PROCEDURE — 99999 PR PBB SHADOW E&M-EST. PATIENT-LVL IV: CPT | Mod: PBBFAC,,, | Performed by: INTERNAL MEDICINE

## 2023-06-16 PROCEDURE — 93005 ELECTROCARDIOGRAM TRACING: CPT

## 2023-06-16 PROCEDURE — 3079F DIAST BP 80-89 MM HG: CPT | Mod: CPTII,S$GLB,, | Performed by: INTERNAL MEDICINE

## 2023-06-16 NOTE — PROGRESS NOTES
Subjective:   Patient ID:  Kayleigh Frazier is a 58 y.o. female     Chief complaint:    HPI  New patient to me. (07/01/2023 )  Referred by Dr Mcdowell for evaluation and management of PVCs/palpitations   --   Background as gleaned from patient's records and today's interview :  HTN, palps, PVCs, JORGE, thyroidectomy due to thyroid nodules, hypothyroidism   Did not tolerate Coreg, was on Nebivolol (up to 10 mg now DCed)  Normal echo  Holter in June 2020 - 9% MM LD LBB PVCs  Holter from 2 days ago reveals 6.7% PVC load with 2 morphologies listed.  I have personally reviewed the actual images of all the ECG tracings available in Epic.  There were 3 ECGs showing PVCs.  There were at least 3 morphologies noted: All had a left bundle branch block morphology in V1 but the progression was variable.  All were associated with a superior axis (therefore not from outflow tract origin).  Today, patient says that she does not have any palpitations.  She says that the only reason she is here is because of concern regarding the significance of the PVCs and not really because of associated symptomatology.      Current Outpatient Medications   Medication Sig    cholecalciferol, vitamin D3, (VITAMIN D3) 250 mcg (10,000 unit) Cap 1 capsule    levothyroxine (SYNTHROID) 100 MCG tablet 1 tablet in the morning on an empty stomach    multivitamin capsule Take 1 capsule by mouth once daily.    sars-cov-2, covid-19, (MODERNA COVID-19) 100 mcg/0.5 ml injection Inject 100 mcg into the muscle.    diclofenac sodium/capsaicin (DICLOFENAC-CAPSAICIN TOP) Apply topically.    hydroCHLOROthiazide (HYDRODIURIL) 12.5 MG Tab Take 1 tablet (12.5 mg total) by mouth daily as needed (edema). (Patient not taking: Reported on 6/16/2023)    levothyroxine (SYNTHROID) 125 MCG tablet 1 tablet in the morning on an empty stomach    LINZESS 145 mcg Cap capsule TK 1 C PO D    magnesium sulfate 100 % crystals by Other route.    triamcinolone acetonide 0.025% (KENALOG)  0.025 % cream APPLY EXTERNALLY TO THE AFFECTED AREA TWICE DAILY AS NEEDED FOR FACIAL RASH OR ITCHING OR FLAKING    valACYclovir (VALTREX) 500 MG tablet Take 500 mg by mouth 2 (two) times daily.     No current facility-administered medications for this visit.       Review of Systems     Constitutional: Reviewed  for decreased appetite, weight gain and weight loss.   HENT: Reviewed for nosebleeds.    Eyes:  Reviewed for blurred vision and visual disturbance.   Cardiovascular: Reviewed for chest pain, claudication, cyanosis,dyspnea on exertion, leg swelling, orthopnea,paroxysmal nocturnal dyspnearregular heartbeats, palpitations, near-syncope, and syncope.   Respiratory: Reviewed for cough, shortness of breath, wheezing, sleep disturbances due to breathing and snoring, .    Endocrine: Reviewed for heat intolerance.   Hematologic/Lymphatic: Reviewed for easy bruisability/bleeding.   Skin: Reviewed for rash.   Musculoskeletal: Reviewed for muscle weakness and myalgias.   Gastrointestinal: Reviewed for abdominal pain, anorexia, melena, nausea and vomiting.   Genitourinary: Reviewed for menorrhagia, frequency, nocturia and incontinence.   Neurological: Reviewed for excessive daytime sleepiness, dizziness, vertigo, weakness, headaches, loss of balance and seizures,   Psychiatric/Behavioral:  Reviewed for insomnia, altered mental status, depression, anxiety and nervousness.       All symptoms reviewed above were negative except for none     Social History     Tobacco Use   Smoking Status Never   Smokeless Tobacco Never       reports current alcohol use.   Past Medical History:   Diagnosis Date    Essential hypertension 1/19/2018    Heart murmur     Obstructive sleep apnea syndrome 8/5/2020    Spondylosis of lumbar spine     Thyroid nodule      Family History   Problem Relation Age of Onset    Hypertension Mother      Social History     Socioeconomic History    Marital status:    Tobacco Use    Smoking status: Never     Smokeless tobacco: Never   Substance and Sexual Activity    Alcohol use: Yes     Comment: occassionally    Drug use: No     Past Surgical History:   Procedure Laterality Date     SECTION      CHOLECYSTECTOMY      CYSTOSCOPY  2013    PARTIAL HYSTERECTOMY  2012    per patient    THYROID SURGERY         Objective:   Physical Exam  Vitals and nursing note reviewed.   Constitutional:       Appearance: She is well-developed.   HENT:      Head: Normocephalic and atraumatic.      Right Ear: External ear normal.      Left Ear: External ear normal.   Neck:      Thyroid: No thyromegaly.   Cardiovascular:      Rate and Rhythm: Normal rate and regular rhythm.      Pulses: Intact distal pulses.           Carotid pulses are 2+ on the right side and 2+ on the left side.       Radial pulses are 2+ on the right side and 2+ on the left side.        Dorsalis pedis pulses are 2+ on the right side and 2+ on the left side.        Posterior tibial pulses are 2+ on the right side and 2+ on the left side.      Heart sounds: Normal heart sounds. No midsystolic click and no opening snap. No murmur heard.    No friction rub. No gallop. No S3 or S4 sounds.   Pulmonary:      Effort: Pulmonary effort is normal.      Breath sounds: Normal breath sounds.   Abdominal:      General: There is no distension.      Palpations: Abdomen is soft.      Tenderness: There is no abdominal tenderness.   Musculoskeletal:      Cervical back: Normal range of motion and neck supple.      Right lower leg: No swelling.      Left lower leg: No swelling.      Right ankle: No swelling.      Left ankle: No swelling.   Skin:     General: Skin is warm.      Findings: No rash.      Nails: There is no clubbing.   Neurological:      Mental Status: She is alert and oriented to person, place, and time.      Cranial Nerves: No cranial nerve deficit.      Gait: Gait normal.   Psychiatric:         Speech: Speech normal.         Behavior: Behavior normal.         Thought  "Content: Thought content normal.     /84   Pulse (!) 52   Ht 5' 6" (1.676 m)   Wt 72 kg (158 lb 11.7 oz)   SpO2 99%   BMI 25.62 kg/m²          No results found for this or any previous visit.    WBC   Date Value Ref Range Status   06/23/2020 5.58 3.90 - 12.70 K/uL Final     Hematocrit   Date Value Ref Range Status   06/23/2020 41.0 37.0 - 48.5 % Final     Hemoglobin   Date Value Ref Range Status   06/23/2020 13.0 12.0 - 16.0 g/dL Final     Lab Results   Component Value Date     06/23/2020     Lab Results   Component Value Date    CREATININE 0.8 06/23/2020    K 3.7 06/23/2020     No results found for: BNP      Assessment:    Asymptomatic benign PVCs in a patient with the normal cardiac function.  They are somewhat unusual in that they are not from an outflow tract origin.  They could be emanating from region around the septal mitral papillary muscle.  Per Holter monitoring, they seemed to be more frequent during diurnal hours  1. PVCs (premature ventricular contractions)    2. Essential hypertension    3. JORGE on CPAP    4. Irritable bowel syndrome, unspecified type        Plan:    Reassurance.  Should these PVCs become very symptomatic, they are likely to be easily suppressed with verapamil and perhaps even more likely by flecainide  No orders of the defined types were placed in this encounter.    Follow up if symptoms worsen or fail to improve.  There are no discontinued medications.  Outpatient Encounter Medications as of 6/16/2023   Medication Sig Dispense Refill    cholecalciferol, vitamin D3, (VITAMIN D3) 250 mcg (10,000 unit) Cap 1 capsule      levothyroxine (SYNTHROID) 100 MCG tablet 1 tablet in the morning on an empty stomach      multivitamin capsule Take 1 capsule by mouth once daily.      sars-cov-2, covid-19, (MODERNA COVID-19) 100 mcg/0.5 ml injection Inject 100 mcg into the muscle.      diclofenac sodium/capsaicin (DICLOFENAC-CAPSAICIN TOP) Apply topically.      hydroCHLOROthiazide " (HYDRODIURIL) 12.5 MG Tab Take 1 tablet (12.5 mg total) by mouth daily as needed (edema). (Patient not taking: Reported on 6/16/2023) 90 tablet 1    levothyroxine (SYNTHROID) 125 MCG tablet 1 tablet in the morning on an empty stomach      LINZESS 145 mcg Cap capsule TK 1 C PO D      magnesium sulfate 100 % crystals by Other route.      triamcinolone acetonide 0.025% (KENALOG) 0.025 % cream APPLY EXTERNALLY TO THE AFFECTED AREA TWICE DAILY AS NEEDED FOR FACIAL RASH OR ITCHING OR FLAKING      valACYclovir (VALTREX) 500 MG tablet Take 500 mg by mouth 2 (two) times daily.       No facility-administered encounter medications on file as of 6/16/2023.     Medication List with Changes/Refills   Current Medications    CHOLECALCIFEROL, VITAMIN D3, (VITAMIN D3) 250 MCG (10,000 UNIT) CAP    1 capsule    DICLOFENAC SODIUM/CAPSAICIN (DICLOFENAC-CAPSAICIN TOP)    Apply topically.    HYDROCHLOROTHIAZIDE (HYDRODIURIL) 12.5 MG TAB    Take 1 tablet (12.5 mg total) by mouth daily as needed (edema).    LEVOTHYROXINE (SYNTHROID) 100 MCG TABLET    1 tablet in the morning on an empty stomach    LEVOTHYROXINE (SYNTHROID) 125 MCG TABLET    1 tablet in the morning on an empty stomach    LINZESS 145 MCG CAP CAPSULE    TK 1 C PO D    MAGNESIUM SULFATE 100 % CRYSTALS    by Other route.    MULTIVITAMIN CAPSULE    Take 1 capsule by mouth once daily.    SARS-COV-2, COVID-19, (MODERNA COVID-19) 100 MCG/0.5 ML INJECTION    Inject 100 mcg into the muscle.    TRIAMCINOLONE ACETONIDE 0.025% (KENALOG) 0.025 % CREAM    APPLY EXTERNALLY TO THE AFFECTED AREA TWICE DAILY AS NEEDED FOR FACIAL RASH OR ITCHING OR FLAKING    VALACYCLOVIR (VALTREX) 500 MG TABLET    Take 500 mg by mouth 2 (two) times daily.        This note is at least partially dictated using the M*Modal Fluency Direct word recognition program. There are word recognition mistakes that are occasionally missed on review.

## 2023-06-19 ENCOUNTER — TELEPHONE (OUTPATIENT)
Dept: CARDIOLOGY | Facility: CLINIC | Age: 58
End: 2023-06-19
Payer: COMMERCIAL

## 2023-06-19 LAB
OHS CV EVENT MONITOR DAY: 0
OHS CV HOLTER LENGTH DECIMAL HOURS: 47.98
OHS CV HOLTER LENGTH HOURS: 47
OHS CV HOLTER LENGTH MINUTES: 59
OHS CV HOLTER SINUS AVERAGE HR: 65
OHS CV HOLTER SINUS MAX HR: 111
OHS CV HOLTER SINUS MIN HR: 37

## 2023-06-19 NOTE — TELEPHONE ENCOUNTER
Please contact the patient and let them know that their results of Holter reveals frequent PVCs with overall normal average heart rates, would have Dr. Kristen Johnson discuss any plans for ablation vs antiarrythmic medications if needed as he recently saw her a few days ago. Continue to monitor BP and heart rates at home.           Pt verbalized understanding with no questions or concerns

## 2023-06-19 NOTE — TELEPHONE ENCOUNTER
LVM for pt in regards     Please contact the patient and let them know that their results of Holter reveals frequent PVCs with overall normal average heart rates, would have Dr. Kristen Johnson discuss any plans for ablation vs antiarrythmic medications if needed as he recently saw her a few days ago. Continue to monitor BP and heart rates at home.

## 2023-11-16 DIAGNOSIS — R00.2 PALPITATIONS: ICD-10-CM

## 2023-11-16 DIAGNOSIS — I49.3 PVCS (PREMATURE VENTRICULAR CONTRACTIONS): Primary | ICD-10-CM

## 2023-11-17 ENCOUNTER — PATIENT MESSAGE (OUTPATIENT)
Dept: CARDIOLOGY | Facility: CLINIC | Age: 58
End: 2023-11-17
Payer: COMMERCIAL

## 2024-03-04 ENCOUNTER — HOSPITAL ENCOUNTER (OUTPATIENT)
Dept: RADIOLOGY | Facility: HOSPITAL | Age: 59
Discharge: HOME | End: 2024-03-04
Attending: PODIATRIST
Payer: COMMERCIAL

## 2024-03-04 ENCOUNTER — OFFICE VISIT (OUTPATIENT)
Dept: PODIATRY | Facility: CLINIC | Age: 59
End: 2024-03-04
Payer: COMMERCIAL

## 2024-03-04 DIAGNOSIS — M25.541 JOINT PAIN IN FINGERS OF RIGHT HAND: ICD-10-CM

## 2024-03-04 DIAGNOSIS — S90.122S: Primary | ICD-10-CM

## 2024-03-04 DIAGNOSIS — M79.89 PAIN AND SWELLING OF TOE, LEFT: ICD-10-CM

## 2024-03-04 DIAGNOSIS — S90.122S: ICD-10-CM

## 2024-03-04 DIAGNOSIS — M65.30 TRIGGER FINGER OF RIGHT HAND, UNSPECIFIED FINGER: ICD-10-CM

## 2024-03-04 DIAGNOSIS — M79.675 PAIN AND SWELLING OF TOE, LEFT: ICD-10-CM

## 2024-03-04 PROCEDURE — 99999 PR PBB SHADOW E&M-EST. PATIENT-LVL III: CPT | Mod: PBBFAC,,, | Performed by: PODIATRIST

## 2024-03-04 PROCEDURE — 1160F RVW MEDS BY RX/DR IN RCRD: CPT | Mod: CPTII,S$GLB,, | Performed by: PODIATRIST

## 2024-03-04 PROCEDURE — 99204 OFFICE O/P NEW MOD 45 MIN: CPT | Mod: S$GLB,,, | Performed by: PODIATRIST

## 2024-03-04 PROCEDURE — 73630 X-RAY EXAM OF FOOT: CPT | Mod: 26,LT,, | Performed by: RADIOLOGY

## 2024-03-04 PROCEDURE — 1159F MED LIST DOCD IN RCRD: CPT | Mod: CPTII,S$GLB,, | Performed by: PODIATRIST

## 2024-03-04 PROCEDURE — 73630 X-RAY EXAM OF FOOT: CPT | Mod: TC,LT

## 2024-03-04 RX ORDER — SPIRONOLACTONE 50 MG/1
50 TABLET, FILM COATED ORAL EVERY MORNING
COMMUNITY
Start: 2024-02-07 | End: 2024-03-27 | Stop reason: SDUPTHER

## 2024-03-04 RX ORDER — LANOLIN ALCOHOL/MO/W.PET/CERES
100 CREAM (GRAM) TOPICAL DAILY
COMMUNITY

## 2024-03-04 RX ORDER — BUSPIRONE HYDROCHLORIDE 5 MG/1
5 TABLET ORAL 2 TIMES DAILY
COMMUNITY
Start: 2024-02-07

## 2024-03-04 RX ORDER — ACETAMINOPHEN AND PHENYLEPHRINE HCL 325; 5 MG/1; MG/1
TABLET ORAL
COMMUNITY

## 2024-03-04 RX ORDER — NABUMETONE 750 MG/1
750 TABLET, FILM COATED ORAL 2 TIMES DAILY
Qty: 60 TABLET | Refills: 0 | Status: SHIPPED | OUTPATIENT
Start: 2024-03-04 | End: 2024-04-03

## 2024-03-04 NOTE — PROGRESS NOTES
Subjective:       Patient ID: Kayleigh Frazier is a 58 y.o. female.    Chief Complaint: Toe Pain (Left toe pain , nondiabetic, rates pain 3 )    HPI: Kayleigh Frazier presents to the clinic today for evaluation concerning stated moderate pains to the left foot/ankle at the 5th toe. Patient states pains are approx. 3/10. Pains are described as achy. Pains have been present for duration of several weeks (). Patient states the pains are exacerbated with walking and standing and prolonged activities. States no prior medical evaluation by a MD/DO/DPM/NP. States no NSAIDs. Trauma is stated. Patient's Primary Care Provider is Brain Munoz MD.     Review of patient's allergies indicates:  No Known Allergies    Past Medical History:   Diagnosis Date    Essential hypertension 2018    Heart murmur     Obstructive sleep apnea syndrome 2020    Spondylosis of lumbar spine     Thyroid nodule        Family History   Problem Relation Age of Onset    Hypertension Mother        Social History     Socioeconomic History    Marital status:    Tobacco Use    Smoking status: Never    Smokeless tobacco: Never   Substance and Sexual Activity    Alcohol use: Yes     Comment: occassionally    Drug use: No       Past Surgical History:   Procedure Laterality Date     SECTION      CHOLECYSTECTOMY      CYSTOSCOPY  2013    PARTIAL HYSTERECTOMY  2012    per patient    THYROID SURGERY         Review of Systems   Constitutional:  Negative for chills, fatigue and fever.   HENT:  Negative for hearing loss.    Eyes:  Negative for photophobia and visual disturbance.   Respiratory:  Negative for cough, chest tightness, shortness of breath and wheezing.    Cardiovascular:  Negative for chest pain and palpitations.   Gastrointestinal:  Negative for constipation, diarrhea, nausea and vomiting.   Endocrine: Negative for cold intolerance and heat intolerance.   Genitourinary:  Negative for flank pain.    Musculoskeletal:  Positive for gait problem. Negative for neck pain and neck stiffness.   Neurological:  Negative for light-headedness and headaches.   Psychiatric/Behavioral:  Negative for sleep disturbance.          Objective:   There were no vitals taken for this visit.    Physical Exam  Musculoskeletal:      Right hand: Tenderness present. Decreased range of motion.       LOWER EXTREMITY PHYSICAL EXAMINATION    DERMATOLOGY: Skin is supple, dry and intact. Minimal ecchymosis is noted, to there LLE 5th toe.    ORTHOPEDIC: MMT is 5/5 on the LLE as compared to the contra-lateral. Minimal edema is noted at the LLE 5th toe. ROM is WNL. Mildly antalgic gait is noted.    Assessment:     1. Contusion of fifth toe of left foot, sequela    2. Pain and swelling of toe, left    3. Joint pain in fingers of right hand    4. Trigger finger of right hand, unspecified finger          Plan:     Contusion of fifth toe of left foot, sequela  -     X-Ray Foot Complete Left; Future; Expected date: 03/04/2024  -     nabumetone (RELAFEN) 750 MG tablet; Take 1 tablet (750 mg total) by mouth 2 (two) times daily.  Dispense: 60 tablet; Refill: 0    Pain and swelling of toe, left  -     X-Ray Foot Complete Left; Future; Expected date: 03/04/2024  -     nabumetone (RELAFEN) 750 MG tablet; Take 1 tablet (750 mg total) by mouth 2 (two) times daily.  Dispense: 60 tablet; Refill: 0    Joint pain in fingers of right hand  -     nabumetone (RELAFEN) 750 MG tablet; Take 1 tablet (750 mg total) by mouth 2 (two) times daily.  Dispense: 60 tablet; Refill: 0    Trigger finger of right hand, unspecified finger  -     nabumetone (RELAFEN) 750 MG tablet; Take 1 tablet (750 mg total) by mouth 2 (two) times daily.  Dispense: 60 tablet; Refill: 0      Thorough discussion is had with the patient today, concerning the diagnosis, its etiology, and the treatment algorithm at present.     XRAYS are reviewed in detail with the patient. All questions and concerns  regarding findings and its/their implications are outlined and discussed.    No fracture noted on XR.    NSAIDs prn.    F/U prn.          Future Appointments   Date Time Provider Department Center   3/4/2024  4:45 PM [facility]-DR [facility]   3/8/2024  3:40 PM Jose Antonio Mcdowell MD [place]

## 2024-03-06 ENCOUNTER — PATIENT MESSAGE (OUTPATIENT)
Dept: CARDIOLOGY | Facility: CLINIC | Age: 59
End: 2024-03-06
Payer: COMMERCIAL

## 2024-03-07 DIAGNOSIS — I49.3 PVCS (PREMATURE VENTRICULAR CONTRACTIONS): Primary | ICD-10-CM

## 2024-03-07 DIAGNOSIS — Z00.00 ROUTINE ADULT HEALTH MAINTENANCE: ICD-10-CM

## 2024-03-26 ENCOUNTER — PATIENT MESSAGE (OUTPATIENT)
Dept: CARDIOLOGY | Facility: CLINIC | Age: 59
End: 2024-03-26
Payer: COMMERCIAL

## 2024-03-27 ENCOUNTER — HOSPITAL ENCOUNTER (OUTPATIENT)
Dept: CARDIOLOGY | Facility: HOSPITAL | Age: 59
Discharge: HOME | End: 2024-03-27
Attending: INTERNAL MEDICINE
Payer: COMMERCIAL

## 2024-03-27 ENCOUNTER — OFFICE VISIT (OUTPATIENT)
Dept: CARDIOLOGY | Facility: CLINIC | Age: 59
End: 2024-03-27
Payer: COMMERCIAL

## 2024-03-27 VITALS
SYSTOLIC BLOOD PRESSURE: 120 MMHG | WEIGHT: 159.81 LBS | OXYGEN SATURATION: 98 % | HEIGHT: 66 IN | DIASTOLIC BLOOD PRESSURE: 70 MMHG | HEART RATE: 64 BPM | BODY MASS INDEX: 25.68 KG/M2

## 2024-03-27 DIAGNOSIS — G47.33 OBSTRUCTIVE SLEEP APNEA SYNDROME: ICD-10-CM

## 2024-03-27 DIAGNOSIS — E03.9 HYPOTHYROIDISM, UNSPECIFIED TYPE: ICD-10-CM

## 2024-03-27 DIAGNOSIS — Z86.16 HISTORY OF COVID-19: ICD-10-CM

## 2024-03-27 DIAGNOSIS — R00.2 PALPITATIONS: ICD-10-CM

## 2024-03-27 DIAGNOSIS — K58.9 IRRITABLE BOWEL SYNDROME, UNSPECIFIED TYPE: ICD-10-CM

## 2024-03-27 DIAGNOSIS — I49.3 PVCS (PREMATURE VENTRICULAR CONTRACTIONS): Primary | ICD-10-CM

## 2024-03-27 DIAGNOSIS — I49.3 PVC'S (PREMATURE VENTRICULAR CONTRACTIONS): ICD-10-CM

## 2024-03-27 DIAGNOSIS — R00.1 SINUS BRADYCARDIA: ICD-10-CM

## 2024-03-27 DIAGNOSIS — Z00.00 ROUTINE ADULT HEALTH MAINTENANCE: ICD-10-CM

## 2024-03-27 DIAGNOSIS — G47.33 OSA ON CPAP: ICD-10-CM

## 2024-03-27 DIAGNOSIS — I10 ESSENTIAL HYPERTENSION: ICD-10-CM

## 2024-03-27 DIAGNOSIS — I49.3 PVCS (PREMATURE VENTRICULAR CONTRACTIONS): ICD-10-CM

## 2024-03-27 DIAGNOSIS — G47.39 OTHER SLEEP APNEA: ICD-10-CM

## 2024-03-27 DIAGNOSIS — E03.8 OTHER SPECIFIED HYPOTHYROIDISM: ICD-10-CM

## 2024-03-27 LAB
OHS QRS DURATION: 74 MS
OHS QTC CALCULATION: 392 MS

## 2024-03-27 PROCEDURE — 99214 OFFICE O/P EST MOD 30 MIN: CPT | Mod: S$GLB,,, | Performed by: INTERNAL MEDICINE

## 2024-03-27 PROCEDURE — 3074F SYST BP LT 130 MM HG: CPT | Mod: CPTII,S$GLB,, | Performed by: INTERNAL MEDICINE

## 2024-03-27 PROCEDURE — 3008F BODY MASS INDEX DOCD: CPT | Mod: CPTII,S$GLB,, | Performed by: INTERNAL MEDICINE

## 2024-03-27 PROCEDURE — 1159F MED LIST DOCD IN RCRD: CPT | Mod: CPTII,S$GLB,, | Performed by: INTERNAL MEDICINE

## 2024-03-27 PROCEDURE — 93005 ELECTROCARDIOGRAM TRACING: CPT

## 2024-03-27 PROCEDURE — 1160F RVW MEDS BY RX/DR IN RCRD: CPT | Mod: CPTII,S$GLB,, | Performed by: INTERNAL MEDICINE

## 2024-03-27 PROCEDURE — G2211 COMPLEX E/M VISIT ADD ON: HCPCS | Mod: S$GLB,,, | Performed by: INTERNAL MEDICINE

## 2024-03-27 PROCEDURE — 99999 PR PBB SHADOW E&M-EST. PATIENT-LVL V: CPT | Mod: PBBFAC,,, | Performed by: INTERNAL MEDICINE

## 2024-03-27 PROCEDURE — 93010 ELECTROCARDIOGRAM REPORT: CPT | Mod: ,,, | Performed by: INTERNAL MEDICINE

## 2024-03-27 PROCEDURE — 3078F DIAST BP <80 MM HG: CPT | Mod: CPTII,S$GLB,, | Performed by: INTERNAL MEDICINE

## 2024-03-27 RX ORDER — SPIRONOLACTONE 50 MG/1
50 TABLET, FILM COATED ORAL DAILY PRN
Qty: 90 TABLET | Refills: 1 | Status: SHIPPED | OUTPATIENT
Start: 2024-03-27

## 2024-03-27 NOTE — PROGRESS NOTES
Subjective:   Patient ID:  Kayleigh Frazier is a 58 y.o. female who presents for cardiac consult of Medication Management (PCP changed medications.)      HPI  The patient came in today for cardiac consult of Medication Management (PCP changed medications.)    PCP - Dr. Ari Sims    Kayleigh Frazier is a 58 y.o. female  HTN, PVCs, JORGE, thyroidectomy due to thyroid nodules, hypothyroidism presents for follow up CV eval.     2/24/23  BP and Hr well controlled. BMI 26 - 161 lbs. Pt's friend had recently passed away from an MVA. Pt has occ stress with mother.   ECG - sinus ady V rate 54, poor RWP - old     3/27/24     moderate pains to the left foot/ankle at the 5th toe. Patient states pains are approx. 3/10. Pains are described as achy. Pains have been present for duration of several weeks (Feb. 3rd).     Last month my primary care physician prescribed me Spironolactone 50mg.      Amie been taking a half of the pill every morning since the beginning, because it had me feeling too tired and sleepy.      My pressure was elevated at my appointment. I always have elevated pressure on my appointment and that morning I was under a lot of stress for personal reasons.      Since then Amie been seeing a therapist weekly and thats been going very well.      The purpose of this message is to inform that my left knee is swollen and feels heavy and painful.   I am not sure if this medication has anything to do with it or not but it could be.         Patient has fairly good exercise tolerance.    Patient is compliant with medications.    1/19/18 ECG - Sinus bradycadia, V rate 48    FH - Grandmother - Stroke in 60s, mult strokes in family    2D ECHO  CONCLUSIONS     1 - No wall motion abnormalities.     2 - Normal left ventricular systolic function (EF 60-65%).     3 - Normal left ventricular diastolic function.     4 - Normal right ventricular systolic function .     5 - The estimated PA systolic pressure is 19 mmHg.      This document has been electronically    SIGNED BY: Beartis Salinas MD On: 2018 17:35    Past Medical History:   Diagnosis Date    Essential hypertension 2018    Heart murmur     Obstructive sleep apnea syndrome 2020    Spondylosis of lumbar spine     Thyroid nodule        Past Surgical History:   Procedure Laterality Date     SECTION      CHOLECYSTECTOMY      CYSTOSCOPY  2013    PARTIAL HYSTERECTOMY  2012    per patient    THYROID SURGERY         Social History     Tobacco Use    Smoking status: Never    Smokeless tobacco: Never   Substance Use Topics    Alcohol use: Yes     Comment: occassionally    Drug use: No       Family History   Problem Relation Age of Onset    Hypertension Mother        Patient's Medications   New Prescriptions    No medications on file   Previous Medications    BIOTIN 10,000 MCG CAP    Take by mouth.    BUSPIRONE (BUSPAR) 5 MG TAB    Take 5 mg by mouth 2 (two) times daily.    CHOLECALCIFEROL, VITAMIN D3, (VITAMIN D3) 250 MCG (10,000 UNIT) CAP    1 capsule    CYANOCOBALAMIN (VITAMIN B-12) 1000 MCG TABLET    Take 100 mcg by mouth once daily.    DICLOFENAC SODIUM/CAPSAICIN (DICLOFENAC-CAPSAICIN TOP)    Apply topically.    LEVOTHYROXINE (SYNTHROID) 100 MCG TABLET    1 tablet in the morning on an empty stomach    LEVOTHYROXINE (SYNTHROID) 125 MCG TABLET    1 tablet in the morning on an empty stomach    LINZESS 145 MCG CAP CAPSULE    TK 1 C PO D    MAGNESIUM SULFATE 100 % CRYSTALS    by Other route.    MULTIVITAMIN CAPSULE    Take 1 capsule by mouth once daily.    NABUMETONE (RELAFEN) 750 MG TABLET    Take 1 tablet (750 mg total) by mouth 2 (two) times daily.    SARS-COV-2, COVID-19, (MODERNA COVID-19) 100 MCG/0.5 ML INJECTION    Inject 100 mcg into the muscle.    TRIAMCINOLONE ACETONIDE 0.025% (KENALOG) 0.025 % CREAM    APPLY EXTERNALLY TO THE AFFECTED AREA TWICE DAILY AS NEEDED FOR FACIAL RASH OR ITCHING OR FLAKING    VALACYCLOVIR (VALTREX) 500 MG TABLET    Take 500 mg  "by mouth 2 (two) times daily.   Modified Medications    Modified Medication Previous Medication    SPIRONOLACTONE (ALDACTONE) 50 MG TABLET spironolactone (ALDACTONE) 50 MG tablet       Take 1 tablet (50 mg total) by mouth daily as needed (edema, swelling, fluid build up).    Take 50 mg by mouth every morning.   Discontinued Medications    HYDROCHLOROTHIAZIDE (HYDRODIURIL) 12.5 MG TAB    Take 1 tablet (12.5 mg total) by mouth daily as needed (edema).       Review of Systems   Constitutional: Negative.    HENT: Negative.     Eyes: Negative.    Respiratory: Negative.     Cardiovascular:  Positive for palpitations.   Gastrointestinal: Negative.    Genitourinary: Negative.    Musculoskeletal:  Positive for joint pain.   Skin: Negative.    Neurological: Negative.    Endo/Heme/Allergies: Negative.    Psychiatric/Behavioral:  The patient is nervous/anxious.    All 12 systems otherwise negative.      Wt Readings from Last 3 Encounters:   03/27/24 72.5 kg (159 lb 13.3 oz)   06/16/23 72 kg (158 lb 11.7 oz)   02/24/23 73.4 kg (161 lb 13.1 oz)     Temp Readings from Last 3 Encounters:   03/03/22 97.7 °F (36.5 °C) (Temporal)     BP Readings from Last 3 Encounters:   03/27/24 120/70   06/16/23 124/84   02/24/23 132/84     Pulse Readings from Last 3 Encounters:   03/27/24 64   06/16/23 (!) 52   02/24/23 (!) 56       /70   Pulse 64   Ht 5' 6" (1.676 m)   Wt 72.5 kg (159 lb 13.3 oz)   SpO2 98%   BMI 25.80 kg/m²      Objective:   Physical Exam  Constitutional:       General: She is not in acute distress.     Appearance: She is well-developed. She is not diaphoretic.   HENT:      Head: Normocephalic and atraumatic.      Mouth/Throat:      Pharynx: No oropharyngeal exudate.   Eyes:      General: No scleral icterus.        Right eye: No discharge.         Left eye: No discharge.   Pulmonary:      Effort: Pulmonary effort is normal. No respiratory distress.   Skin:     Coloration: Skin is not pale.      Findings: No erythema or " rash.   Neurological:      Mental Status: She is alert and oriented to person, place, and time.   Psychiatric:         Behavior: Behavior normal.         Thought Content: Thought content normal.         Judgment: Judgment normal.         Lab Results   Component Value Date     06/23/2020    K 3.7 06/23/2020     06/23/2020    CO2 31 (H) 06/23/2020    BUN 20 06/23/2020    CREATININE 0.8 06/23/2020     06/23/2020    HGBA1C 5.8 06/15/2023    AST 21 06/23/2020    ALT 17 06/23/2020    ALBUMIN 4.4 06/23/2020    PROT 8.7 (H) 06/23/2020    BILITOT 0.3 06/23/2020    WBC 5.58 06/23/2020    HGB 13.0 06/23/2020    HCT 41.0 06/23/2020    MCV 93 06/23/2020     06/23/2020    TSH 10.42 (H) 01/15/2024    TSH 1.811 02/24/2023     Assessment:      1. PVCs (premature ventricular contractions)    2. Palpitations    3. JORGE on CPAP    4. Other specified hypothyroidism    5. Irritable bowel syndrome, unspecified type    6. PVC's (premature ventricular contractions)    7. Essential hypertension    8. Sinus bradycardia    9. History of COVID-19    10. Obstructive sleep apnea syndrome    11. Other sleep apnea    12. Hypothyroidism, unspecified type            Plan:   1. HTN with palpitations , PVCs noted in past  - stopped BB - Dr. Kristen Johnson - Should these PVCs become very symptomatic, they are likely to be easily suppressed with verapamil and perhaps even more likely by flecainide   - discussed low salt diet  - cont diet/exercise  - was coreg and Norvasc in past  - HCTZ PRN for bloating - improved - changed to Aldactone 50mg - take PRN   - refer to Nutrition    2. Bradycardia, sinus   - cont to monitor  - asymptomatic     3. Hypothyroidism, TSH 0.119  - cont meds per PCP/Endo - changed dose -  Synthroid 112 mcg now - half pill one Sunday    4. Joint pain with arm swelling  - f/u rheum   - arm ultrasounds - arterial and venous - negative  - reffered to pain management in past     5. JORGE, h/o COVID 19, June 2022  - cont  CPAP, will f/u pulm; has new machine    6. GERD  - cont PPI    Visit today included increased complexity associated with the care of the episodic problem HTN addressed and managing the longitudinal care of the patient due to the serious and/or complex managed problem(s) .      Thank you for allowing me to participate in this patient's care. Please do not hesitate to contact me with any questions or concerns. Consult note has been forwarded to the referral physician.

## 2024-03-29 ENCOUNTER — PATIENT MESSAGE (OUTPATIENT)
Dept: SLEEP MEDICINE | Facility: CLINIC | Age: 59
End: 2024-03-29
Payer: COMMERCIAL

## 2024-03-29 DIAGNOSIS — G47.33 OSA (OBSTRUCTIVE SLEEP APNEA): Primary | ICD-10-CM

## 2024-04-01 NOTE — TELEPHONE ENCOUNTER
Compliance Information 12/29/2023 - 3/27/2024  Compliance Summary  12/29/2023 - 3/27/2024 (90 days)  Days with Device Usage 4 days  Days without Device Usage 86 days  Percent Days with Device Usage 4.4%  Cumulative Usage 11 hrs. 22 mins. 18 secs.  Maximum Usage (1 Day) 6 hrs. 15 mins. 2 secs.  Average Usage (All Days) 7 mins. 34 secs.  Average Usage (Days Used) 2 hrs. 50 mins. 34 secs.  Minimum Usage (1 Day) 1 hrs. 21 mins. 32 secs.  Percent of Days with Usage >= 4 Hours 1.1%  Percent of Days with Usage < 4 Hours 98.9%  Date Range  Total Blower Time 18 hrs. 33 mins. 1 secs.  CPAP Summary  Average Time in Large Leak Per Day 45 secs.  Average AHI 3.1  CPAP 7.0 cmH2O  Device Settings as of 3/27/2024  CPAP - None  Device Settings  Device Mode  Parameter Value  Auto-Trial Off  CPAP Pressure 7 cmH2O  Auto Off Off  Auto On Off  Ramp+ Time 15 minutes  Ramp+ Start Pressure 4.0 cmH2O  Mask Resistance Off  Tubing Type 15  EZ-Start Disabled  Patient Controls Access On  Patient Data Access On  Tube Temperature 3  Humidifier 5

## 2024-08-15 ENCOUNTER — TELEPHONE (OUTPATIENT)
Dept: FAMILY MEDICINE | Facility: CLINIC | Age: 59
End: 2024-08-15
Payer: COMMERCIAL

## 2024-08-15 DIAGNOSIS — I49.3 PVCS (PREMATURE VENTRICULAR CONTRACTIONS): Primary | ICD-10-CM

## 2024-08-15 NOTE — TELEPHONE ENCOUNTER
----- Message from Comfort Loza sent at 8/15/2024 12:16 PM CDT -----  Type:  Sooner Appointment Request     Patient is requesting a sooner appointment.  Patient declined first available appointment listed as well as another facility and provider .  Patient will not accept being placed on the waitlist and is requesting a message be sent to doctor.     Name of Caller:  Pt    When is the first available appointment?   none  Symptoms:   Annual est care  Would the patient rather a call back or a response via [facility]?     Best Call Back Number:  [phone]      Additional Information:

## 2024-08-15 NOTE — TELEPHONE ENCOUNTER
Patient call returned. She stated that she wanted to check and see if there were any openings for Dr. Rivera on a Wednesday. Patient was informed that Dr. Rivera doesn't see patients on Wednesdays in clinic. Patient verbally understood the information given and stated that she keep her scheduled appt on 8/30.

## 2024-08-30 ENCOUNTER — OFFICE VISIT (OUTPATIENT)
Dept: FAMILY MEDICINE | Facility: CLINIC | Age: 59
End: 2024-08-30
Attending: FAMILY MEDICINE
Payer: COMMERCIAL

## 2024-08-30 VITALS
TEMPERATURE: 97 F | OXYGEN SATURATION: 98 % | WEIGHT: 162.25 LBS | SYSTOLIC BLOOD PRESSURE: 114 MMHG | HEIGHT: 66 IN | DIASTOLIC BLOOD PRESSURE: 82 MMHG | HEART RATE: 48 BPM | BODY MASS INDEX: 26.08 KG/M2

## 2024-08-30 DIAGNOSIS — E66.3 OVERWEIGHT (BMI 25.0-29.9): ICD-10-CM

## 2024-08-30 DIAGNOSIS — R21 RASH: ICD-10-CM

## 2024-08-30 DIAGNOSIS — I49.3 PVC'S (PREMATURE VENTRICULAR CONTRACTIONS): ICD-10-CM

## 2024-08-30 DIAGNOSIS — E89.0 POSTOPERATIVE HYPOTHYROIDISM: ICD-10-CM

## 2024-08-30 DIAGNOSIS — K58.9 IRRITABLE BOWEL SYNDROME, UNSPECIFIED TYPE: ICD-10-CM

## 2024-08-30 DIAGNOSIS — Z78.0 POSTMENOPAUSAL: ICD-10-CM

## 2024-08-30 DIAGNOSIS — F32.9 MAJOR DEPRESSIVE DISORDER WITH SINGLE EPISODE, REMISSION STATUS UNSPECIFIED: ICD-10-CM

## 2024-08-30 DIAGNOSIS — R73.03 PREDIABETES: Primary | ICD-10-CM

## 2024-08-30 DIAGNOSIS — G47.33 OSA ON CPAP: ICD-10-CM

## 2024-08-30 DIAGNOSIS — R00.1 BRADYCARDIA, SINUS: ICD-10-CM

## 2024-08-30 DIAGNOSIS — I10 ESSENTIAL HYPERTENSION: ICD-10-CM

## 2024-08-30 PROCEDURE — 1160F RVW MEDS BY RX/DR IN RCRD: CPT | Mod: CPTII,S$GLB,, | Performed by: FAMILY MEDICINE

## 2024-08-30 PROCEDURE — 99999 PR PBB SHADOW E&M-EST. PATIENT-LVL IV: CPT | Mod: PBBFAC,,, | Performed by: FAMILY MEDICINE

## 2024-08-30 PROCEDURE — 3079F DIAST BP 80-89 MM HG: CPT | Mod: CPTII,S$GLB,, | Performed by: FAMILY MEDICINE

## 2024-08-30 PROCEDURE — 3044F HG A1C LEVEL LT 7.0%: CPT | Mod: CPTII,S$GLB,, | Performed by: FAMILY MEDICINE

## 2024-08-30 PROCEDURE — 3074F SYST BP LT 130 MM HG: CPT | Mod: CPTII,S$GLB,, | Performed by: FAMILY MEDICINE

## 2024-08-30 PROCEDURE — 99214 OFFICE O/P EST MOD 30 MIN: CPT | Mod: S$GLB,,, | Performed by: FAMILY MEDICINE

## 2024-08-30 PROCEDURE — 1159F MED LIST DOCD IN RCRD: CPT | Mod: CPTII,S$GLB,, | Performed by: FAMILY MEDICINE

## 2024-08-30 PROCEDURE — G2211 COMPLEX E/M VISIT ADD ON: HCPCS | Mod: S$GLB,,, | Performed by: FAMILY MEDICINE

## 2024-08-30 PROCEDURE — 3008F BODY MASS INDEX DOCD: CPT | Mod: CPTII,S$GLB,, | Performed by: FAMILY MEDICINE

## 2024-08-30 RX ORDER — METFORMIN HYDROCHLORIDE 500 MG/1
500 TABLET, EXTENDED RELEASE ORAL
Qty: 30 TABLET | Refills: 5 | Status: SHIPPED | OUTPATIENT
Start: 2024-08-30

## 2024-08-30 NOTE — PROGRESS NOTES
Kayleigh Frazier    Chief Complaint   Patient presents with    Establish Care       History of Present Illness:   Ms. Frazier comes in today as a new patient to establish care with me.  She states she has been previously followed by PCP Dr. Brain Munoz.    She states she had Catap[facility] testing last month with A1c of 6 noted.  She states she took metformin for 3 days only in the past as she states it caused her to have headache.      She states her average heart rate is 52.    She states today she noticed rash at the outer part of her left leg under her knee.    Otherwise, she denies having fever, chills, fatigue, appetite changes; shortness of breath, cough, wheezing; chest pain, palpitations, leg swelling; abdominal pain, nausea, vomiting, diarrhea, constipation; unusual urinary symptoms; polydipsia, polyphagia, polyuria, hot or cold intolerance; back pain; numbness, headache; anxiety, depression, homicidal or suicidal thoughts.      She states she follows with the following specialist:  Gyn Dr. Emily Munoz for gynecological care; endocrinologist Dr. Roger for hypothyroidism, prediabetes with last visit in April 20, 2024; gastroenterologist Dr. Woodson for IBS, GERD with last visit on last Wednesday; cardiologist Dr. Mcdowell for hypertension, PVCs with last visit on March 27, 2024; pulmonologist Dr. Garcia for JORGE on CPAP with last visit on August 11, 2022; therapist Shania Gonsalves weekly since March 20, 2024 for anxiety.  She states she currently weaned herself from BuSpar for anxiety.      Labs:           WBC                      5.58                06/23/2020                 HGB                      13.0                06/23/2020                 HCT                      41.0                06/23/2020                 PLT                      225                 06/23/2020                 ALT                      17                  06/23/2020                 AST                      21                   06/23/2020                 NA                       139                 06/23/2020                 K                        3.7                 06/23/2020                 CL                       102                 06/23/2020                 CREATININE               0.8                 06/23/2020                 BUN                      20                  06/23/2020                 CO2                      31 (H)              06/23/2020                 TSH                      5.56 (H)            04/17/2024                 HGBA1C                   6.0 (H)             04/17/2024                      Past Medical History:   Diagnosis Date    Anxiety and depression     Essential hypertension 01/19/2018    Heart murmur     Hypothyroidism, unspecified     Obstructive sleep apnea syndrome 08/05/2020    Prediabetes     Spondylosis of lumbar spine     Thyroid nodule         Current Outpatient Medications   Medication Sig    biotin 10,000 mcg Cap Take by mouth.    busPIRone (BUSPAR) 5 MG Tab Take 5 mg by mouth 2 (two) times daily.    cholecalciferol, vitamin D3, (VITAMIN D3) 250 mcg (10,000 unit) Cap Take by mouth once a week.    cyanocobalamin (VITAMIN B-12) 1000 MCG tablet Take 100 mcg by mouth once daily.    levothyroxine (SYNTHROID) 125 MCG tablet 1 tablet in the morning on an empty stomach    linaCLOtide (LINZESS) 72 mcg Cap capsule Take 72 mcg by mouth before breakfast.    multivitamin capsule Take 1 capsule by mouth once daily.    spironolactone (ALDACTONE) 50 MG tablet Take 1 tablet (50 mg total) by mouth daily as needed (edema, swelling, fluid build up).    valACYclovir (VALTREX) 500 MG tablet Take 500 mg by mouth 2 (two) times daily.       Review of Systems   Constitutional:  Negative for activity change, appetite change, chills, fatigue and fever.   Respiratory:  Negative for cough, shortness of breath and wheezing.         See history of present illness.   Cardiovascular:  Negative for chest pain, palpitations  and leg swelling.        See history of present illness.   Gastrointestinal:  Negative for abdominal pain, constipation, diarrhea, nausea and vomiting.        See history of present illness.   Endocrine: Negative for cold intolerance, heat intolerance, polydipsia, polyphagia and polyuria.        See history of present illness.   Genitourinary:  Negative for difficulty urinating.        See history of present illness.   Musculoskeletal:  Negative for back pain.   Skin:  Positive for rash.        See history of present illness.   Neurological:  Negative for numbness and headaches.   Psychiatric/Behavioral:  Negative for dysphoric mood and suicidal ideas. The patient is not nervous/anxious.         Negative for homicidal ideas.  See history of present illness.       Objective:  Physical Exam  Vitals reviewed.   Constitutional:       General: She is not in acute distress.     Appearance: Normal appearance. She is obese. She is not ill-appearing, toxic-appearing or diaphoretic.      Comments: Pleasant.   Cardiovascular:      Rate and Rhythm: Normal rate and regular rhythm.      Pulses: Normal pulses.      Heart sounds: Murmur heard.   Pulmonary:      Effort: Pulmonary effort is normal. No respiratory distress.      Breath sounds: Normal breath sounds. No wheezing.   Abdominal:      General: Bowel sounds are normal. There is no distension.      Palpations: Abdomen is soft. There is no mass.      Tenderness: There is no abdominal tenderness. There is no guarding or rebound.   Musculoskeletal:         General: No swelling or tenderness. Normal range of motion.      Cervical back: Normal range of motion and neck supple. No tenderness.      Comments: She is ambulatory without problems.   Lymphadenopathy:      Cervical: No cervical adenopathy.   Skin:     General: Skin is warm.      Comments: Subtle discoloration at outer left lower leg.   Neurological:      General: No focal deficit present.      Mental Status: She is alert  and oriented to person, place, and time.   Psychiatric:         Mood and Affect: Mood normal.         Behavior: Behavior normal.         Thought Content: Thought content normal.         Judgment: Judgment normal.         ASSESSMENT:  1. Prediabetes    2. Rash    3. Essential hypertension    4. Bradycardia, sinus    5. PVC's (premature ventricular contractions)    6. Postoperative hypothyroidism    7. JORGE on CPAP    8. Irritable bowel syndrome, unspecified type    9. Major depressive disorder with single episode, remission status unspecified    10. Overweight (BMI 25.0-29.9)    11. Postmenopausal        PLAN:  Kayleigh was seen today for establish care.    Diagnoses and all orders for this visit:    Prediabetes  -     metFORMIN (GLUCOPHAGE-XR) 500 MG ER 24hr tablet; Take 1 tablet (500 mg total) by mouth daily with breakfast.    Rash    Essential hypertension    Bradycardia, sinus    PVC's (premature ventricular contractions)    Postoperative hypothyroidism    JORGE on CPAP    Irritable bowel syndrome, unspecified type    Major depressive disorder with single episode, remission status unspecified    Overweight (BMI 25.0-29.9)    Postmenopausal      No labs today.  Continue current medications, follow low sodium, low cholesterol, low carb diet, daily walks.  Add Metformin- mg daily as directed; medication precautions discussed with patient.  Reassurance regarding rash at left lower leg; however, see dermatology if not resolved in 3 weeks.  Keep follow up with specialists.  Flu shot this fall.  Follow up in about 6 months (around 2/28/2025) for prediabetes follow up.

## 2024-09-04 DIAGNOSIS — I10 ESSENTIAL HYPERTENSION: ICD-10-CM

## 2024-09-05 PROBLEM — E66.3 OVERWEIGHT (BMI 25.0-29.9): Status: ACTIVE | Noted: 2024-09-05

## 2024-09-05 PROBLEM — Z78.0 POSTMENOPAUSAL: Status: ACTIVE | Noted: 2024-09-05

## 2024-09-05 PROBLEM — R73.03 PREDIABETES: Status: ACTIVE | Noted: 2024-09-05

## 2024-09-30 DIAGNOSIS — I49.3 PVCS (PREMATURE VENTRICULAR CONTRACTIONS): Primary | ICD-10-CM

## 2024-10-02 ENCOUNTER — HOSPITAL ENCOUNTER (OUTPATIENT)
Dept: CARDIOLOGY | Facility: HOSPITAL | Age: 59
Discharge: HOME | End: 2024-10-02
Attending: INTERNAL MEDICINE
Payer: COMMERCIAL

## 2024-10-02 ENCOUNTER — OFFICE VISIT (OUTPATIENT)
Dept: CARDIOLOGY | Facility: CLINIC | Age: 59
End: 2024-10-02
Payer: COMMERCIAL

## 2024-10-02 VITALS
DIASTOLIC BLOOD PRESSURE: 76 MMHG | OXYGEN SATURATION: 97 % | SYSTOLIC BLOOD PRESSURE: 120 MMHG | BODY MASS INDEX: 25.83 KG/M2 | HEART RATE: 76 BPM | HEIGHT: 66 IN | WEIGHT: 160.69 LBS

## 2024-10-02 DIAGNOSIS — I10 ESSENTIAL HYPERTENSION: ICD-10-CM

## 2024-10-02 DIAGNOSIS — I49.3 PVCS (PREMATURE VENTRICULAR CONTRACTIONS): ICD-10-CM

## 2024-10-02 DIAGNOSIS — R00.1 SINUS BRADYCARDIA: ICD-10-CM

## 2024-10-02 DIAGNOSIS — Z86.16 HISTORY OF COVID-19: ICD-10-CM

## 2024-10-02 DIAGNOSIS — E03.8 OTHER SPECIFIED HYPOTHYROIDISM: ICD-10-CM

## 2024-10-02 DIAGNOSIS — G47.33 OBSTRUCTIVE SLEEP APNEA SYNDROME: ICD-10-CM

## 2024-10-02 DIAGNOSIS — I49.3 PVCS (PREMATURE VENTRICULAR CONTRACTIONS): Primary | ICD-10-CM

## 2024-10-02 DIAGNOSIS — K58.9 IRRITABLE BOWEL SYNDROME, UNSPECIFIED TYPE: ICD-10-CM

## 2024-10-02 DIAGNOSIS — R00.2 PALPITATIONS: ICD-10-CM

## 2024-10-02 DIAGNOSIS — G47.33 OSA ON CPAP: ICD-10-CM

## 2024-10-02 DIAGNOSIS — I49.3 PVC'S (PREMATURE VENTRICULAR CONTRACTIONS): ICD-10-CM

## 2024-10-02 LAB
OHS QRS DURATION: 76 MS
OHS QTC CALCULATION: 438 MS

## 2024-10-02 PROCEDURE — 3008F BODY MASS INDEX DOCD: CPT | Mod: CPTII,S$GLB,, | Performed by: INTERNAL MEDICINE

## 2024-10-02 PROCEDURE — 3078F DIAST BP <80 MM HG: CPT | Mod: CPTII,S$GLB,, | Performed by: INTERNAL MEDICINE

## 2024-10-02 PROCEDURE — G2211 COMPLEX E/M VISIT ADD ON: HCPCS | Mod: S$GLB,,, | Performed by: INTERNAL MEDICINE

## 2024-10-02 PROCEDURE — 1159F MED LIST DOCD IN RCRD: CPT | Mod: CPTII,S$GLB,, | Performed by: INTERNAL MEDICINE

## 2024-10-02 PROCEDURE — 93010 ELECTROCARDIOGRAM REPORT: CPT | Mod: ,,, | Performed by: INTERNAL MEDICINE

## 2024-10-02 PROCEDURE — 3074F SYST BP LT 130 MM HG: CPT | Mod: CPTII,S$GLB,, | Performed by: INTERNAL MEDICINE

## 2024-10-02 PROCEDURE — 99214 OFFICE O/P EST MOD 30 MIN: CPT | Mod: S$GLB,,, | Performed by: INTERNAL MEDICINE

## 2024-10-02 PROCEDURE — 99999 PR PBB SHADOW E&M-EST. PATIENT-LVL III: CPT | Mod: PBBFAC,,, | Performed by: INTERNAL MEDICINE

## 2024-10-02 PROCEDURE — 3044F HG A1C LEVEL LT 7.0%: CPT | Mod: CPTII,S$GLB,, | Performed by: INTERNAL MEDICINE

## 2024-10-02 PROCEDURE — 1160F RVW MEDS BY RX/DR IN RCRD: CPT | Mod: CPTII,S$GLB,, | Performed by: INTERNAL MEDICINE

## 2024-10-02 PROCEDURE — 93005 ELECTROCARDIOGRAM TRACING: CPT

## 2024-10-02 NOTE — PROGRESS NOTES
Subjective:   Patient ID:  Kayleigh Frazier is a 59 y.o. female who presents for cardiac consult of No chief complaint on file.      HPI  The patient came in today for cardiac consult of No chief complaint on file.    PCP - Dr. Ari Sims    Kayleigh Frazier is a 59 y.o. female  HTN, PVCs, JORGE, thyroidectomy due to thyroid nodules, hypothyroidism presents for follow up CV eval.     2/24/23  BP and Hr well controlled. BMI 26 - 161 lbs. Pt's friend had recently passed away from an MVA. Pt has occ stress with mother.   ECG - sinus ady V rate 54, poor RWP - old     3/27/24   moderate pains to the left foot/ankle at the 5th toe. Patient states pains are approx. 3/10. Pains are described as achy. Pains have been present for duration of several weeks (Feb. 3rd).   Last month my primary care physician prescribed me Spironolactone 50mg.   Amie been taking a half of the pill every morning since the beginning, because it had me feeling too tired and sleepy.   My pressure was elevated at my appointment. I always have elevated pressure on my appointment and that morning I was under a lot of stress for personal reasons.   Since then Amie been seeing a therapist weekly and thats been going very well.   The purpose of this message is to inform that my left knee is swollen and feels heavy and painful.   I am not sure if this medication has anything to do with it or not but it could be.     10/2/24  BP and HR stable. BMI 25  She has been doing therapy since Feb - she has been on Buspar but has stopped it.   She restarted Buspar as she got more busy at work.     ECG - NSR, occ PVCs, poor RWP      She works for [place] [employer] - works with the state  1/19/18 ECG - Sinus bradycadia, V rate 48      FH - Grandmother - Stroke in 60s, mult strokes in family    2D ECHO  CONCLUSIONS     1 - No wall motion abnormalities.     2 - Normal left ventricular systolic function (EF 60-65%).     3 - Normal left ventricular diastolic function.      4 - Normal right ventricular systolic function .     5 - The estimated PA systolic pressure is 19 mmHg.     This document has been electronically    SIGNED BY: Beatris Salinas MD On: 2018 17:35    Past Medical History:   Diagnosis Date    Anxiety and depression     Essential hypertension 2018    Heart murmur     Hypothyroidism, unspecified     Obstructive sleep apnea syndrome 2020    Prediabetes     Spondylosis of lumbar spine     Thyroid nodule        Past Surgical History:   Procedure Laterality Date     SECTION      CHOLECYSTECTOMY      CYSTOSCOPY  2013    PARTIAL HYSTERECTOMY  2012    per patient    THYROID SURGERY         Social History     Tobacco Use    Smoking status: Never    Smokeless tobacco: Never   Substance Use Topics    Alcohol use: Yes     Comment: occassionally    Drug use: No       Family History   Problem Relation Name Age of Onset    Hypertension Mother      Sarcoidosis Father      No Known Problems Son      No Known Problems Son         Patient's Medications   New Prescriptions    No medications on file   Previous Medications    BIOTIN 10,000 MCG CAP    Take by mouth.    BUSPIRONE (BUSPAR) 5 MG TAB    Take 5 mg by mouth 2 (two) times daily.    CHOLECALCIFEROL, VITAMIN D3, (VITAMIN D3) 250 MCG (10,000 UNIT) CAP    Take by mouth once a week.    CYANOCOBALAMIN (VITAMIN B-12) 1000 MCG TABLET    Take 100 mcg by mouth once daily.    LEVOTHYROXINE (SYNTHROID) 125 MCG TABLET    1 tablet in the morning on an empty stomach    LINACLOTIDE (LINZESS) 72 MCG CAP CAPSULE    Take 72 mcg by mouth before breakfast.    METFORMIN (GLUCOPHAGE-XR) 500 MG ER 24HR TABLET    Take 1 tablet (500 mg total) by mouth daily with breakfast.    MULTIVITAMIN CAPSULE    Take 1 capsule by mouth once daily.    SPIRONOLACTONE (ALDACTONE) 50 MG TABLET    Take 1 tablet (50 mg total) by mouth daily as needed (edema, swelling, fluid build up).    VALACYCLOVIR (VALTREX) 500 MG TABLET    Take 500 mg by mouth 2  (two) times daily.   Modified Medications    No medications on file   Discontinued Medications    No medications on file       Review of Systems   Constitutional: Negative.    HENT: Negative.     Eyes: Negative.    Respiratory: Negative.     Cardiovascular:  Positive for palpitations.   Gastrointestinal: Negative.    Genitourinary: Negative.    Musculoskeletal:  Positive for joint pain.   Skin: Negative.    Neurological: Negative.    Endo/Heme/Allergies: Negative.    Psychiatric/Behavioral:  The patient is nervous/anxious.    All 12 systems otherwise negative.      Wt Readings from Last 3 Encounters:   08/30/24 73.6 kg (162 lb 4.1 oz)   03/27/24 72.5 kg (159 lb 13.3 oz)   06/16/23 72 kg (158 lb 11.7 oz)     Temp Readings from Last 3 Encounters:   08/30/24 96.8 °F (36 °C) (Tympanic)   03/03/22 97.7 °F (36.5 °C) (Temporal)     BP Readings from Last 3 Encounters:   08/30/24 114/82   03/27/24 120/70   06/16/23 124/84     Pulse Readings from Last 3 Encounters:   08/30/24 (!) 48   03/27/24 64   06/16/23 (!) 52       There were no vitals taken for this visit.     Objective:   Physical Exam  Constitutional:       General: She is not in acute distress.     Appearance: She is well-developed. She is not diaphoretic.   HENT:      Head: Normocephalic and atraumatic.      Mouth/Throat:      Pharynx: No oropharyngeal exudate.   Eyes:      General: No scleral icterus.        Right eye: No discharge.         Left eye: No discharge.   Pulmonary:      Effort: Pulmonary effort is normal. No respiratory distress.   Skin:     Coloration: Skin is not pale.      Findings: No erythema or rash.   Neurological:      Mental Status: She is alert and oriented to person, place, and time.   Psychiatric:         Behavior: Behavior normal.         Thought Content: Thought content normal.         Judgment: Judgment normal.         Lab Results   Component Value Date     06/23/2020    K 3.7 06/23/2020     06/23/2020    CO2 31 (H) 06/23/2020     BUN 20 06/23/2020    CREATININE 0.8 06/23/2020     06/23/2020    HGBA1C 6.0 (H) 04/17/2024    AST 21 06/23/2020    ALT 17 06/23/2020    ALBUMIN 4.4 06/23/2020    PROT 8.7 (H) 06/23/2020    BILITOT 0.3 06/23/2020    WBC 5.58 06/23/2020    HGB 13.0 06/23/2020    HCT 41.0 06/23/2020    MCV 93 06/23/2020     06/23/2020    TSH 5.56 (H) 04/17/2024    TSH 1.811 02/24/2023     Assessment:      1. PVCs (premature ventricular contractions)    2. JORGE on CPAP    3. Other specified hypothyroidism    4. Palpitations    5. Irritable bowel syndrome, unspecified type    6. Essential hypertension    7. PVC's (premature ventricular contractions)    8. Sinus bradycardia    9. Obstructive sleep apnea syndrome    10. History of COVID-19        Plan:   1. HTN with palpitations , PVCs noted in past  - stopped BB - Dr. Kristen Johnson - Should these PVCs become very symptomatic, they are likely to be easily suppressed with verapamil and perhaps even more likely by flecainide   - discussed low salt diet  - cont diet/exercise  - was on coreg and Norvasc in past  - HCTZ PRN for bloating - improved - changed to Aldactone 50mg - take PRN   - refer to Nutrition    2. Bradycardia, sinus - in NSR now   - cont to monitor  - asymptomatic     3. Hypothyroidism, TSH 0.119  - cont meds per PCP/Endo - changed dose -  Synthroid 125 mcg    4. Joint pain with arm swelling  - f/u rheum   - arm ultrasounds - arterial and venous - negative  - reffered to pain management in past     5. JORGE, h/o COVID 19, June 2022  - cont CPAP, will f/u pulm; has new machine    6. GERD  - was on PPI    7. Anxiety/depression  - cont tx per PCP/therapy  - rec meditation     8. PreDM  A1c 6.0  - started Metformin    Visit today included increased complexity associated with the care of the episodic problem Palpitations addressed and managing the longitudinal care of the patient due to the serious and/or complex managed problem(s) .      Thank you for allowing me to  participate in this patient's care. Please do not hesitate to contact me with any questions or concerns. Consult note has been forwarded to the referral physician.                        risk factors Yes

## 2024-10-22 ENCOUNTER — PATIENT MESSAGE (OUTPATIENT)
Dept: FAMILY MEDICINE | Facility: CLINIC | Age: 59
End: 2024-10-22
Payer: COMMERCIAL

## 2024-10-22 ENCOUNTER — TELEPHONE (OUTPATIENT)
Facility: CLINIC | Age: 59
End: 2024-10-22
Payer: COMMERCIAL

## 2024-10-22 ENCOUNTER — TELEPHONE (OUTPATIENT)
Dept: CARDIOLOGY | Facility: CLINIC | Age: 59
End: 2024-10-22
Payer: COMMERCIAL

## 2024-10-22 ENCOUNTER — TELEPHONE (OUTPATIENT)
Dept: FAMILY MEDICINE | Facility: CLINIC | Age: 59
End: 2024-10-22
Payer: COMMERCIAL

## 2024-10-22 ENCOUNTER — NURSE TRIAGE (OUTPATIENT)
Dept: ADMINISTRATIVE | Facility: CLINIC | Age: 59
End: 2024-10-22
Payer: COMMERCIAL

## 2024-10-22 NOTE — PLAN OF CARE
"[facility] Emergency Department Plan of Care Note    Referral source: Nurse On-Call      Reason for consult: Cough:Sore throat, hoarse voice, cough, congestion, intermittent SOB/CP "due to drainage", thinks its environmental exposure from moving offices. No current CP/SOB, fever. Refuses VV even no LOS, can't get appt with PCP, wants re-eval. Encouraged to return to  for re-eval if can't get in with PCP.       Disposition recommended: Urgent Care      Additional Recommendations: n/a  "

## 2024-10-22 NOTE — TELEPHONE ENCOUNTER
Patient call was returned, she was informed that both Dr. Rivera and her NP were out of the office this week. Patient was offered to be scheduled at another location but declined to schedule. She says she wants to see a allergy specialist because she has issues with her allergies and since she changed offices at work it's been worse due to the dust in the new office. Patient stated that she will reach out to her cardiologist to see about getting a referral to allergy. Patient verbally understood the information given.

## 2024-10-22 NOTE — TELEPHONE ENCOUNTER
"----- Message from Nurse Luda sent at 10/22/2024  9:34 AM CDT -----  Regarding: Needs Appointment  Good Morning!     Per [facility] RN on call nurse on 10/22/24: "She was seen at  on Friday and received shot and cough syrup.  She has been taking cough syrup and TheraFlu.  C/O sore throat, hoarseness, non-productive cough, nasal congestion, PND, intermittent SOB d/t drainage, and intermittent CP d/t drainage.  Covid/Flu/Strep - on Friday.  She reports that she moved to a different office and when moves it causes these symptoms.  Denies body aches, fever, and current SOB/CP.  Per protocol, care advised is go to ED/UCC now (or to office with PCP approval). No available appts with PCP/Allergist today.  She cannot do a VV d/t insurance payment doesn't cover it as well."    [facility] RN on call nurse consulted with [facility] BRYANT on call, Dr. Keysha Mccarty, and disposition was for patient to F/U with PCP and if no available appointments, patient to be seen at Urgent Care. I was unable to schedule an appointment with PCP in [facility]. Can clinic staff please reach out to this patient to schedule a F/U appointment?    Thanks in advance!  "

## 2024-10-22 NOTE — TELEPHONE ENCOUNTER
"Sent the following high priority in-basket message to Dr. Tricia Rivera's clinic staff pool:    Good Morning!     Per [facility] RN on call nurse on 10/22/24: "She was seen at  on Friday and received shot and cough syrup.  She has been taking cough syrup and TheraFlu.  C/O sore throat, hoarseness, non-productive cough, nasal congestion, PND, intermittent SOB d/t drainage, and intermittent CP d/t drainage.  Covid/Flu/Strep - on Friday.  She reports that she moved to a different office and when moves it causes these symptoms.  Denies body aches, fever, and current SOB/CP.  Per protocol, care advised is go to ED/UCC now (or to office with PCP approval). No available appts with PCP/Allergist today.  She cannot do a VV d/t insurance payment doesn't cover it as well."    [facility] RN on call nurse consulted with RodrickCECILIA BRYANT on call, Dr. Keysha Mccarty, and disposition was for patient to F/U with PCP and if no available appointments, patient to be seen at Urgent Care. I was unable to schedule an appointment with PCP in [facility]. Can clinic staff please reach out to this patient to schedule a F/U appointment?    Thanks in advance!  "

## 2024-10-22 NOTE — TELEPHONE ENCOUNTER
[place]    PCP:  Dr. Tricia Rivera    She was seen at  on Friday and received shot and cough syrup.  She has been taking cough syrup and TheraFlu.  C/O sore throat, hoarseness, non-productive cough, nasal congestion, PND, intermittent SOB d/t drainage, and intermittent CP d/t drainage.  Covid/Flu/Strep - on Friday.  She reports that she moved to a different office and when moves it causes these symptoms.  Denies body aches, fever, and current SOB/CP.  Per protocol, care advised is go to ED/UCC now (or to office with PCP approval).  Pt referred to [facility] Provider (Dr. Keysha Mccarty).  [facility] Provider recommends: VV with her, appt with PCP within the next couple of days/.  NT notified pt of [facility] Provider recommendations.  Pt VU, states not going to do VV d/t insurance coverage of visit, and if PCP can't see her within the next couple of days then she'll go back to .  Advised to call for worsening/questions/concerns.  VU.    Reason for Disposition   Drinking very little and has signs of dehydration (e.g., no urine > 12 hours, very dry mouth, very lightheaded)   Patient sounds very sick or weak to the triager    Additional Information   Negative: SEVERE difficulty breathing (e.g., struggling for each breath, speaks in single words)   Negative: Sounds like a life-threatening emergency to the triager   Negative: Drooling or spitting out saliva (because can't swallow)   Negative: Unable to open mouth completely   Negative: Sounds like a life-threatening emergency to the triager   Negative: Difficulty breathing, and not from stuffy nose (e.g., not relieved by cleaning out the nose)   Negative: SEVERE headache and has fever    Protocols used: Sinus Pain and Congestion-A-[place], Sore Throat-A-[place]

## 2024-10-22 NOTE — TELEPHONE ENCOUNTER
LM for pt to call us back      ----- Message from [employer] sent at 10/22/2024  9:49 AM CDT -----  Contact: Kayleigh  .Type:  Needs Medical Advice    Who Called:  Kayleigh     Would the patient rather a call back or a response via [facility]?  Call back   Best Call Back Number:  [phone]  Additional Information:  Pt is calling in regard to getting a call back from the nurse to discuss questions and concerns       Thanks

## 2024-12-30 ENCOUNTER — LAB VISIT (OUTPATIENT)
Dept: LAB | Facility: HOSPITAL | Age: 59
End: 2024-12-30
Attending: INTERNAL MEDICINE
Payer: COMMERCIAL

## 2024-12-30 DIAGNOSIS — I10 ESSENTIAL HYPERTENSION: ICD-10-CM

## 2024-12-30 DIAGNOSIS — M25.50 ARTHRALGIA, UNSPECIFIED JOINT: ICD-10-CM

## 2024-12-30 LAB
ALBUMIN SERPL BCP-MCNC: 4.2 G/DL (ref 3.5–5.2)
ALP SERPL-CCNC: 111 U/L (ref 40–150)
ALT SERPL W/O P-5'-P-CCNC: 13 U/L (ref 10–44)
ANION GAP SERPL CALC-SCNC: 10 MMOL/L (ref 8–16)
AST SERPL-CCNC: 22 U/L (ref 10–40)
BACTERIA #/AREA URNS HPF: NORMAL /HPF
BILIRUB SERPL-MCNC: 0.4 MG/DL (ref 0.1–1)
BILIRUB UR QL STRIP: NEGATIVE
BUN SERPL-MCNC: 18 MG/DL (ref 6–20)
CALCIUM SERPL-MCNC: 9.7 MG/DL (ref 8.7–10.5)
CHLORIDE SERPL-SCNC: 103 MMOL/L (ref 95–110)
CLARITY UR: CLEAR
CO2 SERPL-SCNC: 27 MMOL/L (ref 23–29)
COLOR UR: YELLOW
CREAT SERPL-MCNC: 0.8 MG/DL (ref 0.5–1.4)
CREAT UR-MCNC: 55.1 MG/DL (ref 15–325)
EST. GFR  (NO RACE VARIABLE): >60 ML/MIN/1.73 M^2
GLUCOSE SERPL-MCNC: 86 MG/DL (ref 70–110)
GLUCOSE UR QL STRIP: NEGATIVE
HGB UR QL STRIP: ABNORMAL
KETONES UR QL STRIP: NEGATIVE
LEUKOCYTE ESTERASE UR QL STRIP: NEGATIVE
MICROSCOPIC COMMENT: NORMAL
NITRITE UR QL STRIP: NEGATIVE
PH UR STRIP: 6 [PH] (ref 5–8)
POTASSIUM SERPL-SCNC: 3.8 MMOL/L (ref 3.5–5.1)
PROT SERPL-MCNC: 8.4 G/DL (ref 6–8.4)
PROT UR QL STRIP: NEGATIVE
PROT UR-MCNC: 7 MG/DL (ref 0–15)
PROT/CREAT UR: 0.13 MG/G{CREAT} (ref 0–0.2)
RBC #/AREA URNS HPF: 3 /HPF (ref 0–4)
SODIUM SERPL-SCNC: 140 MMOL/L (ref 136–145)
SP GR UR STRIP: <=1.005 (ref 1–1.03)
URN SPEC COLLECT METH UR: ABNORMAL
WBC #/AREA URNS HPF: 1 /HPF (ref 0–5)

## 2024-12-30 PROCEDURE — 81000 URINALYSIS NONAUTO W/SCOPE: CPT | Performed by: INTERNAL MEDICINE

## 2024-12-30 PROCEDURE — 80053 COMPREHEN METABOLIC PANEL: CPT | Performed by: FAMILY MEDICINE

## 2024-12-30 PROCEDURE — 86334 IMMUNOFIX E-PHORESIS SERUM: CPT | Performed by: INTERNAL MEDICINE

## 2024-12-30 PROCEDURE — 86334 IMMUNOFIX E-PHORESIS SERUM: CPT | Mod: 26,,, | Performed by: PATHOLOGY

## 2024-12-30 PROCEDURE — 84165 PROTEIN E-PHORESIS SERUM: CPT | Mod: 26,,, | Performed by: PATHOLOGY

## 2024-12-30 PROCEDURE — 84165 PROTEIN E-PHORESIS SERUM: CPT | Performed by: INTERNAL MEDICINE

## 2024-12-30 PROCEDURE — 36415 COLL VENOUS BLD VENIPUNCTURE: CPT | Performed by: INTERNAL MEDICINE

## 2024-12-30 PROCEDURE — 82570 ASSAY OF URINE CREATININE: CPT | Performed by: INTERNAL MEDICINE

## 2024-12-31 LAB
ALBUMIN SERPL ELPH-MCNC: 4.52 G/DL (ref 3.35–5.55)
ALPHA1 GLOB SERPL ELPH-MCNC: 0.36 G/DL (ref 0.17–0.41)
ALPHA2 GLOB SERPL ELPH-MCNC: 0.77 G/DL (ref 0.43–0.99)
B-GLOBULIN SERPL ELPH-MCNC: 1.03 G/DL (ref 0.5–1.1)
GAMMA GLOB SERPL ELPH-MCNC: 1.32 G/DL (ref 0.67–1.58)
INTERPRETATION SERPL IFE-IMP: NORMAL
PROT SERPL-MCNC: 8 G/DL (ref 6–8.4)

## 2025-01-02 ENCOUNTER — PATIENT MESSAGE (OUTPATIENT)
Dept: RHEUMATOLOGY | Facility: CLINIC | Age: 60
End: 2025-01-02
Payer: COMMERCIAL

## 2025-01-02 LAB
PATHOLOGIST INTERPRETATION IFE: NORMAL
PATHOLOGIST INTERPRETATION SPE: NORMAL

## 2025-01-03 ENCOUNTER — OFFICE VISIT (OUTPATIENT)
Dept: FAMILY MEDICINE | Facility: CLINIC | Age: 60
End: 2025-01-03
Attending: FAMILY MEDICINE
Payer: COMMERCIAL

## 2025-01-03 VITALS
HEIGHT: 66 IN | OXYGEN SATURATION: 99 % | WEIGHT: 164.88 LBS | DIASTOLIC BLOOD PRESSURE: 86 MMHG | TEMPERATURE: 99 F | HEART RATE: 76 BPM | BODY MASS INDEX: 26.5 KG/M2 | SYSTOLIC BLOOD PRESSURE: 124 MMHG

## 2025-01-03 DIAGNOSIS — E89.0 POSTOPERATIVE HYPOTHYROIDISM: ICD-10-CM

## 2025-01-03 DIAGNOSIS — I10 ESSENTIAL HYPERTENSION: ICD-10-CM

## 2025-01-03 DIAGNOSIS — I49.3 PVC'S (PREMATURE VENTRICULAR CONTRACTIONS): ICD-10-CM

## 2025-01-03 DIAGNOSIS — J30.2 SEASONAL ALLERGIC RHINITIS, UNSPECIFIED TRIGGER: ICD-10-CM

## 2025-01-03 DIAGNOSIS — E66.3 OVERWEIGHT (BMI 25.0-29.9): ICD-10-CM

## 2025-01-03 DIAGNOSIS — F32.9 MAJOR DEPRESSIVE DISORDER WITH SINGLE EPISODE, REMISSION STATUS UNSPECIFIED: ICD-10-CM

## 2025-01-03 DIAGNOSIS — R73.03 PREDIABETES: ICD-10-CM

## 2025-01-03 PROCEDURE — 99999 PR PBB SHADOW E&M-EST. PATIENT-LVL V: CPT | Mod: PBBFAC,,, | Performed by: FAMILY MEDICINE

## 2025-01-03 PROCEDURE — 3008F BODY MASS INDEX DOCD: CPT | Mod: CPTII,S$GLB,, | Performed by: FAMILY MEDICINE

## 2025-01-03 PROCEDURE — 96372 THER/PROPH/DIAG INJ SC/IM: CPT | Mod: S$GLB,,, | Performed by: FAMILY MEDICINE

## 2025-01-03 PROCEDURE — 3074F SYST BP LT 130 MM HG: CPT | Mod: CPTII,S$GLB,, | Performed by: FAMILY MEDICINE

## 2025-01-03 PROCEDURE — 3079F DIAST BP 80-89 MM HG: CPT | Mod: CPTII,S$GLB,, | Performed by: FAMILY MEDICINE

## 2025-01-03 PROCEDURE — 3044F HG A1C LEVEL LT 7.0%: CPT | Mod: CPTII,S$GLB,, | Performed by: FAMILY MEDICINE

## 2025-01-03 PROCEDURE — 1159F MED LIST DOCD IN RCRD: CPT | Mod: CPTII,S$GLB,, | Performed by: FAMILY MEDICINE

## 2025-01-03 PROCEDURE — 99214 OFFICE O/P EST MOD 30 MIN: CPT | Mod: 25,S$GLB,, | Performed by: FAMILY MEDICINE

## 2025-01-03 RX ORDER — METHYLPREDNISOLONE ACETATE 80 MG/ML
80 INJECTION, SUSPENSION INTRA-ARTICULAR; INTRALESIONAL; INTRAMUSCULAR; SOFT TISSUE ONCE
Status: COMPLETED | OUTPATIENT
Start: 2025-01-03 | End: 2025-01-03

## 2025-01-03 RX ADMIN — METHYLPREDNISOLONE ACETATE 80 MG: 80 INJECTION, SUSPENSION INTRA-ARTICULAR; INTRALESIONAL; INTRAMUSCULAR; SOFT TISSUE at 05:01

## 2025-01-03 NOTE — PROGRESS NOTES
Kayleigh KRAUS Freddie    Chief Complaint   Patient presents with    Nasal Congestion    Pre-diabetes    Follow-up       History of Present Illness:   Ms. Castro, a nonsmoker, comes in today with complaint of having only nasal congestion and yellow thick mucus upon blowing her nose for 1 week.  She states she took Zyrtec at night without help and took Tylenol cold liquid with help with sleep.  She states she has been using Neti pot with help.  She states she also drinks green tea with peg chews twice daily with help. She states she has not received flu shot this fall and does not desire to take it.     On August 30, 2024 she was prescribed metformin  mg daily for treatment of prediabetes.  She states she took it for 3 months and noticed her cravings resolves and she cut back on eating sweets; however, she states approximately 1 month ago she stopped taking metformin XR as cravings returned.     She saw Dr. JOSE MARTIN Roger, endocrinologist, in 2024 for hypothyroidism. She states she will not see her until later in 2025 and requests thyroid check at this time.     She states she follows with Dr. Mccarty, urologist, every 6 months for surveillance microscopic hematuria.    She saw Dr. Mcdowell, cardiologist, on October 2, 2024 for PVCs (premature ventricular contractions), JORGE on CPAP, other specified hypothyroidism, palpitations, irritable bowel syndrome, unspecified type, essential hypertension, PVC's (premature ventricular contractions), sinus bradycardia, obstructive sleep apnea syndrome, history of COVID-19 with 6-month follow up advised.     She states she continues to follow with a therapist every Wednesday for depression and anxiety and states she took herself off of BuSpar 2 months ago without problems.     Otherwise, she denies having fever, chills, fatigue, appetite changes; shortness of breath, cough, wheezing; chest pain, palpitations, leg swelling; other sinus symptoms; abdominal pain, nausea, vomiting,  diarrhea, constipation; unusual urinary symptoms; polydipsia, polyphagia, polyuria, hot or cold intolerance; back pain; acute visual changes, numbness, headache; anxiety, depression, homicidal or suicidal thoughts.                Labs:                    WBC                      5.58                06/23/2020                 HGB                      13.0                06/23/2020                 HCT                      41.0                06/23/2020                 PLT                      225                 06/23/2020                 ALT                      13                  12/30/2024                 AST                      22                  12/30/2024                 NA                       140                 12/30/2024                 K                        3.8                 12/30/2024                 CL                       103                 12/30/2024                 CREATININE               0.8                 12/30/2024                 BUN                      18                  12/30/2024                 CO2                      27                  12/30/2024                 TSH                      5.56 (H)            04/17/2024                 HGBA1C                   6.0 (H)             04/17/2024                  Current Outpatient Medications   Medication Sig    biotin 10,000 mcg Cap Take by mouth.    cholecalciferol, vitamin D3, (VITAMIN D3) 250 mcg (10,000 unit) Cap Take by mouth once a week.    linaCLOtide (LINZESS) 72 mcg Cap capsule Take 72 mcg by mouth before breakfast.    spironolactone (ALDACTONE) 50 MG tablet Take 1 tablet (50 mg total) by mouth daily as needed (edema, swelling, fluid build up).    cyanocobalamin (VITAMIN B-12) 1000 MCG tablet Take 100 mcg by mouth once daily.    levothyroxine (SYNTHROID) 125 MCG tablet 1 tablet in the morning on an empty stomach    metFORMIN (GLUCOPHAGE-XR) 500 MG ER 24hr tablet Take 1 tablet (500 mg total) by mouth daily with breakfast.  (Patient not taking: Reported on 1/3/2025)    multivitamin capsule Take 1 capsule by mouth once daily.    valACYclovir (VALTREX) 500 MG tablet Take 500 mg by mouth 2 (two) times daily.       Review of Systems   Constitutional:  Negative for activity change, appetite change, chills, fatigue and fever.   HENT:  Positive for congestion. Negative for postnasal drip, rhinorrhea, sinus pressure, sinus pain, sneezing and sore throat.    Eyes:  Negative for visual disturbance.   Respiratory:  Negative for cough, shortness of breath and wheezing.    Cardiovascular:  Negative for chest pain, palpitations and leg swelling.   Gastrointestinal:  Negative for abdominal pain, constipation, diarrhea, nausea and vomiting.   Endocrine: Negative for cold intolerance, heat intolerance, polydipsia, polyphagia and polyuria.   Genitourinary:  Negative for difficulty urinating.   Musculoskeletal:  Negative for back pain.   Neurological:  Negative for numbness and headaches.   Psychiatric/Behavioral:  Negative for dysphoric mood and suicidal ideas. The patient is not nervous/anxious.         Negative for homicidal ideas.       Objective:  Physical Exam  Vitals reviewed.   Constitutional:       General: She is not in acute distress.     Appearance: Normal appearance. She is not ill-appearing, toxic-appearing or diaphoretic.      Comments: Pleasant.   HENT:      Head: Normocephalic and atraumatic.      Comments: Non tender sinusitis.     Right Ear: Tympanic membrane, ear canal and external ear normal. There is no impacted cerumen.      Left Ear: Tympanic membrane, ear canal and external ear normal. There is no impacted cerumen.      Nose: Congestion present. No rhinorrhea.      Mouth/Throat:      Mouth: Mucous membranes are moist.      Pharynx: Oropharynx is clear. No oropharyngeal exudate or posterior oropharyngeal erythema.   Eyes:      General:         Right eye: No discharge.         Left eye: No discharge.      Extraocular Movements:  Extraocular movements intact.      Conjunctiva/sclera: Conjunctivae normal.      Pupils: Pupils are equal, round, and reactive to light.   Cardiovascular:      Rate and Rhythm: Normal rate and regular rhythm.      Pulses:           Dorsalis pedis pulses are 3+ on the right side and 3+ on the left side.      Heart sounds: No murmur heard.  Pulmonary:      Effort: Pulmonary effort is normal. No respiratory distress.      Breath sounds: Normal breath sounds. No wheezing.   Abdominal:      General: Bowel sounds are normal. There is no distension.      Palpations: Abdomen is soft. There is no mass.      Tenderness: There is no abdominal tenderness. There is no guarding or rebound.   Musculoskeletal:         General: No swelling or tenderness. Normal range of motion.      Cervical back: Normal range of motion and neck supple. No tenderness.      Comments: She is ambulatory without problems.   Feet:      Right foot:      Protective Sensation: 5 sites tested.  5 sites sensed.      Skin integrity: No ulcer or skin breakdown.      Left foot:      Protective Sensation: 5 sites tested.  5 sites sensed.      Skin integrity: No ulcer or skin breakdown.   Lymphadenopathy:      Cervical: No cervical adenopathy.   Skin:     General: Skin is warm.   Neurological:      General: No focal deficit present.      Mental Status: She is alert and oriented to person, place, and time.   Psychiatric:         Mood and Affect: Mood normal.         Behavior: Behavior normal.         Thought Content: Thought content normal.         Judgment: Judgment normal.         ASSESSMENT:  1. Prediabetes    2. Postoperative hypothyroidism    3. Essential hypertension    4. PVC's (premature ventricular contractions)    5. Seasonal allergic rhinitis, unspecified trigger    6. Major depressive disorder with single episode, remission status unspecified    7. Overweight (BMI 25.0-29.9)        PLAN:  Kayleigh was seen today for nasal congestion, pre-diabetes and  follow-up.    Diagnoses and all orders for this visit:    Prediabetes on medication  -     Hemoglobin A1C; Future    Postoperative hypothyroidism on medication and managed by endocrinology  -     TSH; Future    Essential hypertension on medication and managed by cardiology    PVC's (premature ventricular contractions) stable and managed by cardiology    Seasonal allergic rhinitis, unspecified trigger  -     methylPREDNISolone acetate injection 80 mg    Major depressive disorder with single episode, remission status unspecified stable and managed by therapist    Overweight (BMI 25.0-29.9)      Patient advised to call for results.  Continue current medications (including use Flonase at home), follow low sodium, low cholesterol, low carb diet, daily walks.  Use humidifier.  Keep follow up with specialists.  Patient declines flu shot today.  Follow up in about 6 months (around 7/3/2025), or if symptoms worsen or fail to improve, for physical.

## 2025-01-04 ENCOUNTER — LAB VISIT (OUTPATIENT)
Dept: LAB | Facility: HOSPITAL | Age: 60
End: 2025-01-04
Payer: COMMERCIAL

## 2025-01-04 DIAGNOSIS — E89.0 POSTOPERATIVE HYPOTHYROIDISM: ICD-10-CM

## 2025-01-04 DIAGNOSIS — R73.03 PREDIABETES: ICD-10-CM

## 2025-01-04 LAB
ESTIMATED AVG GLUCOSE: 117 MG/DL (ref 68–131)
HBA1C MFR BLD: 5.7 % (ref 4–5.6)
T4 FREE SERPL-MCNC: 0.69 NG/DL (ref 0.71–1.51)
TSH SERPL DL<=0.005 MIU/L-ACNC: 14.2 UIU/ML (ref 0.4–4)

## 2025-01-04 PROCEDURE — 84439 ASSAY OF FREE THYROXINE: CPT | Performed by: FAMILY MEDICINE

## 2025-01-04 PROCEDURE — 83036 HEMOGLOBIN GLYCOSYLATED A1C: CPT | Performed by: FAMILY MEDICINE

## 2025-01-04 PROCEDURE — 84443 ASSAY THYROID STIM HORMONE: CPT | Performed by: FAMILY MEDICINE

## 2025-01-08 ENCOUNTER — PATIENT MESSAGE (OUTPATIENT)
Dept: FAMILY MEDICINE | Facility: CLINIC | Age: 60
End: 2025-01-08
Payer: COMMERCIAL

## 2025-01-10 LAB
CHOLESTEROL, TOTAL: 214 (ref 100–200)
HDL/CHOLESTEROL RATIO: 3 %
HDLC SERPL-MCNC: 71 MG/DL
LDLC SERPL CALC-MCNC: 131 MG/DL
TRIGL SERPL-MCNC: 64 MG/DL
VLDLC SERPL-MCNC: 13 MG/DL (ref 5–40)

## 2025-02-02 ENCOUNTER — TELEPHONE (OUTPATIENT)
Dept: FAMILY MEDICINE | Facility: CLINIC | Age: 60
End: 2025-02-02
Payer: COMMERCIAL

## 2025-02-02 DIAGNOSIS — E89.0 POSTOPERATIVE HYPOTHYROIDISM: Primary | ICD-10-CM

## 2025-02-03 RX ORDER — LEVOTHYROXINE SODIUM 137 UG/1
TABLET ORAL
Qty: 90 TABLET | Refills: 0 | Status: SHIPPED | OUTPATIENT
Start: 2025-02-03

## 2025-02-03 NOTE — TELEPHONE ENCOUNTER
Pt advised that rx for Synthroid 137 mcg was sent to pharmacy. Pt verbalized understanding She states she is notified by pharmacy once prescriptions are filled.

## 2025-02-03 NOTE — TELEPHONE ENCOUNTER
Pt advised of lab results information via telephone. Pt verbalized understanding. Pt requesting you send in Synthroid 137 mcg because her endocrinologist is out until April. Pt scheduled for 2 month recheck on 4/4/25. Please advise.

## 2025-02-03 NOTE — TELEPHONE ENCOUNTER
I have put the following orders and/or medications to this note.  Please advise pt and assist.    No orders of the defined types were placed in this encounter.      Medications Ordered This Encounter   Medications    levothyroxine (SYNTHROID) 137 MCG Tab tablet     Sig: Take 1 pill by mouth every morning.     Dispense:  90 tablet     Refill:  0

## 2025-02-03 NOTE — TELEPHONE ENCOUNTER
Advise pt lab results show:  Stable prediabetes; continue current medication  Abnormal thyroid levels; if she desires me to adjust medication (she follows with endocrinology), I recommend she increase Synthroid 125 mcg daily to 137 mcg daily and see me in 2 months for thyroid recheck. Just let me know. Thanks.

## 2025-02-26 ENCOUNTER — PATIENT OUTREACH (OUTPATIENT)
Dept: ADMINISTRATIVE | Facility: HOSPITAL | Age: 60
End: 2025-02-26
Payer: COMMERCIAL

## 2025-03-24 ENCOUNTER — TELEPHONE (OUTPATIENT)
Dept: PHARMACY | Facility: CLINIC | Age: 60
End: 2025-03-24
Payer: COMMERCIAL

## 2025-03-24 NOTE — TELEPHONE ENCOUNTER
[facility]/Population Health Chart Review & Patient Outreach Details For Medication Adherence Project    Reason for Outreach Encounter: 3rd Party payor non-compliance report ([employer], BCBS, C, etc)  2.  Patient Outreach Method: Reviewed Patient Chart  3.   Medication in question: metformin   LAST FILLED: 1/16/25 for 90 day supply  Diabetes Medications              metFORMIN (GLUCOPHAGE-XR) 500 MG ER 24hr tablet Take 1 tablet (500 mg total) by mouth daily with breakfast.              4.  Reviewed and or Updates Made To: Patient Chart  5. Outreach Outcomes and/or actions taken: Patient filled medication and is on track to be adherent

## 2025-03-30 DIAGNOSIS — R73.03 PREDIABETES: ICD-10-CM

## 2025-03-30 RX ORDER — METFORMIN HYDROCHLORIDE 500 MG/1
500 TABLET, EXTENDED RELEASE ORAL
Qty: 90 TABLET | Refills: 1 | Status: SHIPPED | OUTPATIENT
Start: 2025-03-30

## 2025-03-30 NOTE — TELEPHONE ENCOUNTER
No care due was identified.  [facility] Embedded Care Due Messages. Reference number: [id number].   3/30/2025 5:43:35 AM CDT

## 2025-03-30 NOTE — TELEPHONE ENCOUNTER
No care due was identified.  [facility] Embedded Care Due Messages. Reference number: [id number].   3/30/2025 3:16:20 PM CDT

## 2025-03-31 RX ORDER — METFORMIN HYDROCHLORIDE 500 MG/1
500 TABLET, EXTENDED RELEASE ORAL
Qty: 30 TABLET | Refills: 5 | OUTPATIENT
Start: 2025-03-31

## 2025-03-31 NOTE — TELEPHONE ENCOUNTER
Refill Decision Note   Kayleigh Frazier  is requesting a refill authorization.  Brief Assessment and Rationale for Refill:  Approve     Medication Therapy Plan:         Comments:     Note composed:8:34 PM 03/30/2025

## 2025-03-31 NOTE — TELEPHONE ENCOUNTER
Refill Decision Note   Kayleigh Frazier  is requesting a refill authorization.  Brief Assessment and Rationale for Refill:  Quick Discontinue     Medication Therapy Plan: reordered on 3/30/2025 by Ric Ruffin, PharmD.      Comments:     Note composed:2:25 PM 03/31/2025

## 2025-04-29 DIAGNOSIS — E89.0 POSTOPERATIVE HYPOTHYROIDISM: ICD-10-CM

## 2025-04-29 NOTE — TELEPHONE ENCOUNTER
Care Due:                  Date            Visit Type   Department     Provider  --------------------------------------------------------------------------------                                MYCHART                              FOLLOWUP/OF  [facility]  Last Visit: 01-      FICE VISIT   MEDICINE       Tricia Rivera                              EP -                              PRIMARY      [facility]  Next Visit: 07-      CARE ([facility])   MEDICINE       Tricia Rivera                                                            Last  Test          Frequency    Reason                     Performed    Due Date  --------------------------------------------------------------------------------    HBA1C.......  6 months...  metFORMIN................  01- 07-    [facility] Embedded Care Due Messages. Reference number: [id number].   4/29/2025 7:56:32 AM CDT

## 2025-04-29 NOTE — TELEPHONE ENCOUNTER
Refill Routing Note   Medication(s) are not appropriate for processing by [facility] for the following reason(s):        Required labs abnormal    [facility] action(s):  Defer     Requires labs : Yes             Appointments  past 12m or future 3m with PCP    Date Provider   Last Visit   1/3/2025 Tricia Rivera MD   Next Visit   7/28/2025 Tricia Rivera MD   ED visits in past 90 days: 0        Note composed:9:12 AM 04/29/2025

## 2025-05-02 ENCOUNTER — TELEPHONE (OUTPATIENT)
Dept: PHARMACY | Facility: CLINIC | Age: 60
End: 2025-05-02

## 2025-05-02 NOTE — TELEPHONE ENCOUNTER
[facility]/Population Health Chart Review & Patient Outreach Details For Medication Adherence Project    Reason for Outreach Encounter: 3rd Party payor non-compliance report ([employer], BCBS, C, etc)  2.  Patient Outreach Method: Reviewed patient chart  and [employer] message  3.   Medication in question:    Diabetes Medications              metFORMIN (GLUCOPHAGE-XR) 500 MG ER 24hr tablet TAKE 1 TABLET(500 MG) BY MOUTH DAILY WITH BREAKFAST                 metformin  last filled  1/16 for 90 day supply      4.  Reviewed and or Updates Made To: Patient Chart  5. Outreach Outcomes and/or actions taken: Sent inquiry to patient: Waiting for response  Additional Notes:

## 2025-05-03 RX ORDER — LEVOTHYROXINE SODIUM 137 UG/1
137 TABLET ORAL EVERY MORNING
Qty: 90 TABLET | Refills: 2 | Status: SHIPPED | OUTPATIENT
Start: 2025-05-03

## 2025-05-20 ENCOUNTER — PATIENT MESSAGE (OUTPATIENT)
Dept: CARDIOLOGY | Facility: CLINIC | Age: 60
End: 2025-05-20
Payer: COMMERCIAL

## 2025-05-21 ENCOUNTER — TELEPHONE (OUTPATIENT)
Dept: CARDIOLOGY | Facility: CLINIC | Age: 60
End: 2025-05-21
Payer: COMMERCIAL

## 2025-05-23 ENCOUNTER — OFFICE VISIT (OUTPATIENT)
Dept: CARDIOLOGY | Facility: CLINIC | Age: 60
End: 2025-05-23
Payer: COMMERCIAL

## 2025-05-23 ENCOUNTER — HOSPITAL ENCOUNTER (OUTPATIENT)
Dept: CARDIOLOGY | Facility: HOSPITAL | Age: 60
Discharge: HOME | End: 2025-05-23
Attending: INTERNAL MEDICINE
Payer: COMMERCIAL

## 2025-05-23 VITALS
BODY MASS INDEX: 26.15 KG/M2 | OXYGEN SATURATION: 99 % | SYSTOLIC BLOOD PRESSURE: 120 MMHG | HEART RATE: 64 BPM | WEIGHT: 162.06 LBS | DIASTOLIC BLOOD PRESSURE: 80 MMHG

## 2025-05-23 DIAGNOSIS — G47.33 OSA ON CPAP: ICD-10-CM

## 2025-05-23 DIAGNOSIS — R00.2 PALPITATIONS: ICD-10-CM

## 2025-05-23 DIAGNOSIS — Z86.16 HISTORY OF COVID-19: ICD-10-CM

## 2025-05-23 DIAGNOSIS — I49.3 PVCS (PREMATURE VENTRICULAR CONTRACTIONS): Primary | ICD-10-CM

## 2025-05-23 DIAGNOSIS — K58.9 IRRITABLE BOWEL SYNDROME, UNSPECIFIED TYPE: ICD-10-CM

## 2025-05-23 DIAGNOSIS — G47.39 OTHER SLEEP APNEA: ICD-10-CM

## 2025-05-23 DIAGNOSIS — I10 ESSENTIAL HYPERTENSION: ICD-10-CM

## 2025-05-23 DIAGNOSIS — I49.3 PVCS (PREMATURE VENTRICULAR CONTRACTIONS): ICD-10-CM

## 2025-05-23 DIAGNOSIS — I49.3 PVC'S (PREMATURE VENTRICULAR CONTRACTIONS): ICD-10-CM

## 2025-05-23 DIAGNOSIS — R00.1 SINUS BRADYCARDIA: ICD-10-CM

## 2025-05-23 DIAGNOSIS — G47.33 OBSTRUCTIVE SLEEP APNEA SYNDROME: ICD-10-CM

## 2025-05-23 DIAGNOSIS — E03.8 OTHER SPECIFIED HYPOTHYROIDISM: ICD-10-CM

## 2025-05-23 LAB
OHS QRS DURATION: 74 MS
OHS QTC CALCULATION: 390 MS

## 2025-05-23 PROCEDURE — 93010 ELECTROCARDIOGRAM REPORT: CPT | Mod: ,,, | Performed by: INTERNAL MEDICINE

## 2025-05-23 PROCEDURE — 99999 PR PBB SHADOW E&M-EST. PATIENT-LVL III: CPT | Mod: PBBFAC,,, | Performed by: INTERNAL MEDICINE

## 2025-05-23 PROCEDURE — 93005 ELECTROCARDIOGRAM TRACING: CPT

## 2025-05-23 RX ORDER — SPIRONOLACTONE 50 MG/1
50 TABLET, FILM COATED ORAL DAILY PRN
Qty: 90 TABLET | Refills: 1 | Status: SHIPPED | OUTPATIENT
Start: 2025-05-23

## 2025-05-23 NOTE — PROGRESS NOTES
Subjective:   Patient ID:  Kayleigh Frazier is a 59 y.o. female who presents for cardiac consult of No chief complaint on file.      HPI  The patient came in today for cardiac consult of No chief complaint on file.    PCP - Dr. Ari Sims    Kayleigh Frazier is a 59 y.o. female  HTN, PVCs, JORGE, thyroidectomy due to thyroid nodules, hypothyroidism presents for follow up CV eval.       10/2/24  BP and HR stable. BMI 25  She has been doing therapy since Feb - she has been on Buspar but has stopped it.   She restarted Buspar as she got more busy at work.   ECG - NSR, occ PVCs, poor RWP    5/23/25  From pt   Im working back at the [facility].   I work for a [employer]. I am in the emergency room at the [facility] and the Emergency Team did a EKG and that looks great. Its my blood pressure   182/85. I think its sodium and stress.   I ate salty red beans and sausage on yesterday at the cafeteria. My headache is located in the back of my neck to the top of my head. I dont have a refill of my medicine.   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 HR 64. BMI 26 - 162 lbs  ECG - sinus ady V rate 49   She works for [place] [employer] - works with the [employer], now with a senator           FH - Grandmother - Stroke in 60s, mult strokes in family    2D ECHO  CONCLUSIONS     1 - No wall motion abnormalities.     2 - Normal left ventricular systolic function (EF 60-65%).     3 - Normal left ventricular diastolic function.     4 - Normal right ventricular systolic function .     5 - The estimated PA systolic pressure is 19 mmHg.     This document has been electronically    SIGNED BY: Beatris Salinas MD On: 01/24/2018 17:35    Past Medical History:   Diagnosis Date    Anxiety and depression     Essential hypertension 01/19/2018    Heart murmur     Hypothyroidism, unspecified     Obstructive sleep apnea syndrome  2020    Prediabetes     Spondylosis of lumbar spine     Thyroid nodule        Past Surgical History:   Procedure Laterality Date     SECTION      CHOLECYSTECTOMY      CYSTOSCOPY  2013    PARTIAL HYSTERECTOMY  2012    per patient    THYROID SURGERY         Social History     Tobacco Use    Smoking status: Never    Smokeless tobacco: Never   Substance Use Topics    Alcohol use: Yes     Comment: occassionally    Drug use: No       Family History   Problem Relation Name Age of Onset    Hypertension Mother      Sarcoidosis Father      No Known Problems Son      No Known Problems Son         Patient's Medications   New Prescriptions    No medications on file   Previous Medications    BIOTIN 10,000 MCG CAP    Take by mouth.    CHOLECALCIFEROL, VITAMIN D3, (VITAMIN D3) 250 MCG (10,000 UNIT) CAP    Take by mouth once a week.    CYANOCOBALAMIN (VITAMIN B-12) 1000 MCG TABLET    Take 100 mcg by mouth once daily.    LEVOTHYROXINE (SYNTHROID) 137 MCG TAB TABLET    TAKE 1 TABLET BY MOUTH EVERY MORNING    LINACLOTIDE (LINZESS) 72 MCG CAP CAPSULE    Take 72 mcg by mouth before breakfast.    METFORMIN (GLUCOPHAGE-XR) 500 MG ER 24HR TABLET    TAKE 1 TABLET(500 MG) BY MOUTH DAILY WITH BREAKFAST    MULTIVITAMIN CAPSULE    Take 1 capsule by mouth once daily.    SPIRONOLACTONE (ALDACTONE) 50 MG TABLET    Take 1 tablet (50 mg total) by mouth daily as needed (edema, swelling, fluid build up).    VALACYCLOVIR (VALTREX) 500 MG TABLET    Take 500 mg by mouth 2 (two) times daily.   Modified Medications    No medications on file   Discontinued Medications    No medications on file       Review of Systems   Constitutional: Negative.    HENT: Negative.     Eyes: Negative.    Respiratory: Negative.     Cardiovascular:  Positive for palpitations.   Gastrointestinal: Negative.    Genitourinary: Negative.    Musculoskeletal:  Positive for joint pain.   Skin: Negative.    Neurological: Negative.    Endo/Heme/Allergies: Negative.     Psychiatric/Behavioral:  The patient is nervous/anxious.    All 12 systems otherwise negative.      Wt Readings from Last 3 Encounters:   05/23/25 73.5 kg (162 lb 0.6 oz)   01/03/25 74.8 kg (164 lb 14.5 oz)   10/02/24 72.9 kg (160 lb 11.5 oz)     Temp Readings from Last 3 Encounters:   01/03/25 98.9 °F (37.2 °C) (Tympanic)   08/30/24 96.8 °F (36 °C) (Tympanic)   03/03/22 97.7 °F (36.5 °C) (Temporal)     BP Readings from Last 3 Encounters:   05/23/25 120/80   01/03/25 124/86   10/02/24 120/76     Pulse Readings from Last 3 Encounters:   05/23/25 64   01/03/25 76   10/02/24 76       /80 (BP Location: Right arm, Patient Position: Sitting)   Pulse 64   Wt 73.5 kg (162 lb 0.6 oz)   SpO2 99%   BMI 26.15 kg/m²      Objective:   Physical Exam  Constitutional:       General: She is not in acute distress.     Appearance: She is well-developed. She is not diaphoretic.   HENT:      Head: Normocephalic and atraumatic.      Mouth/Throat:      Pharynx: No oropharyngeal exudate.   Eyes:      General: No scleral icterus.        Right eye: No discharge.         Left eye: No discharge.   Pulmonary:      Effort: Pulmonary effort is normal. No respiratory distress.   Skin:     Coloration: Skin is not pale.      Findings: No erythema or rash.   Neurological:      Mental Status: She is alert and oriented to person, place, and time.   Psychiatric:         Behavior: Behavior normal.         Thought Content: Thought content normal.         Judgment: Judgment normal.         Lab Results   Component Value Date     12/30/2024    K 3.8 12/30/2024     12/30/2024    CO2 27 12/30/2024    BUN 18 12/30/2024    CREATININE 0.8 12/30/2024    GLU 86 12/30/2024    HGBA1C 5.7 (H) 01/04/2025    AST 22 12/30/2024    ALT 13 12/30/2024    ALBUMIN 4.2 12/30/2024    PROT 8.4 12/30/2024    BILITOT 0.4 12/30/2024    WBC 5.58 06/23/2020    HGB 13.0 06/23/2020    HCT 41.0 06/23/2020    MCV 93 06/23/2020     06/23/2020    TSH 14.200 (H)  01/04/2025    HDL 71 01/10/2025    LDLCALC 131 (H) 01/10/2025    TRIG 64 01/10/2025     Assessment:      1. PVCs (premature ventricular contractions)    2. JORGE on CPAP    3. Other specified hypothyroidism    4. Irritable bowel syndrome, unspecified type    5. Essential hypertension    6. Palpitations    7. PVC's (premature ventricular contractions)    8. Sinus bradycardia    9. Obstructive sleep apnea syndrome    10. History of COVID-19    11. Other sleep apnea          Plan:   1. HTN with palpitations , PVCs noted in past - elevated occ   - stopped BB - Dr. Kristen Johnson - Should these PVCs become very symptomatic, they are likely to be easily suppressed with verapamil and perhaps even more likely by flecainide   - discussed low salt diet  - cont diet/exercise  - was on coreg and Norvasc in past  - HCTZ PRN for bloating - improved - changed to Aldactone 50mg - take PRN   - refer to Nutrition    2. Bradycardia, sinus -V rate 49  - cont to monitor  - asymptomatic     3. Hypothyroidism, TSH 0.119  - cont meds per PCP/Endo - changed dose -  Synthroid 125 mcg    4. Joint pain with arm swelling  - f/u rheum   - arm ultrasounds - arterial and venous - negative  - reffered to pain management in past     5. JORGE, h/o COVID 19, June 2022  - cont CPAP, will f/u pulm; has new machine    6. GERD  - was on PPI    7. Anxiety/depression  - cont tx per PCP/therapy - buspar   - rec meditation     8. PreDM  A1c 6.0  - cont Metformin    Visit today included increased complexity associated with the care of the episodic problem Palpitations addressed and managing the longitudinal care of the patient due to the serious and/or complex managed problem(s) .      Thank you for allowing me to participate in this patient's care. Please do not hesitate to contact me with any questions or concerns. Consult note has been forwarded to the referral physician.

## 2025-06-17 ENCOUNTER — TELEPHONE (OUTPATIENT)
Dept: PHARMACY | Facility: CLINIC | Age: 60
End: 2025-06-17
Payer: COMMERCIAL

## 2025-06-17 NOTE — TELEPHONE ENCOUNTER
[facility]/Population Health Chart Review & Patient Outreach Details For Medication Adherence Project    Reason for Outreach Encounter: 3rd Party payor non-compliance report ([employer], BCBS, C, etc)  2.  Patient Outreach Method: Reviewed patient chart  and [employer] message  3.   Medication in question:    Diabetes Medications              metFORMIN (GLUCOPHAGE-XR) 500 MG ER 24hr tablet TAKE 1 TABLET(500 MG) BY MOUTH DAILY WITH BREAKFAST                 metformin  last filled  1/16/25 for 90 day supply      4.  Reviewed and or Updates Made To: Patient Chart  5. Outreach Outcomes and/or actions taken: Patient reminded to  prescription  Additional Notes:

## 2025-06-25 ENCOUNTER — LAB VISIT (OUTPATIENT)
Dept: LAB | Facility: HOSPITAL | Age: 60
End: 2025-06-25
Payer: COMMERCIAL

## 2025-06-25 ENCOUNTER — OFFICE VISIT (OUTPATIENT)
Dept: FAMILY MEDICINE | Facility: CLINIC | Age: 60
End: 2025-06-25
Payer: COMMERCIAL

## 2025-06-25 VITALS
TEMPERATURE: 98 F | WEIGHT: 162.5 LBS | BODY MASS INDEX: 26.12 KG/M2 | DIASTOLIC BLOOD PRESSURE: 82 MMHG | HEIGHT: 66 IN | HEART RATE: 71 BPM | SYSTOLIC BLOOD PRESSURE: 118 MMHG | OXYGEN SATURATION: 98 %

## 2025-06-25 DIAGNOSIS — E66.3 OVERWEIGHT (BMI 25.0-29.9): ICD-10-CM

## 2025-06-25 DIAGNOSIS — E55.9 VITAMIN D DEFICIENCY: ICD-10-CM

## 2025-06-25 DIAGNOSIS — E03.8 OTHER SPECIFIED HYPOTHYROIDISM: ICD-10-CM

## 2025-06-25 DIAGNOSIS — L65.9 HAIR LOSS: ICD-10-CM

## 2025-06-25 DIAGNOSIS — I10 ESSENTIAL HYPERTENSION: ICD-10-CM

## 2025-06-25 DIAGNOSIS — R73.03 PREDIABETES: ICD-10-CM

## 2025-06-25 DIAGNOSIS — R53.83 FATIGUE, UNSPECIFIED TYPE: Primary | ICD-10-CM

## 2025-06-25 DIAGNOSIS — R53.83 FATIGUE, UNSPECIFIED TYPE: ICD-10-CM

## 2025-06-25 LAB
25(OH)D3+25(OH)D2 SERPL-MCNC: 65 NG/ML (ref 30–96)
ABSOLUTE EOSINOPHIL (OHS): 0.11 K/UL
ABSOLUTE MONOCYTE (OHS): 0.4 K/UL (ref 0.3–1)
ABSOLUTE NEUTROPHIL COUNT (OHS): 3.83 K/UL (ref 1.8–7.7)
ANION GAP (OHS): 14 MMOL/L (ref 8–16)
BACTERIA #/AREA URNS AUTO: ABNORMAL /HPF
BASOPHILS # BLD AUTO: 0.04 K/UL
BASOPHILS NFR BLD AUTO: 0.6 %
BILIRUB UR QL STRIP.AUTO: NEGATIVE
BUN SERPL-MCNC: 20 MG/DL (ref 6–20)
CALCIUM SERPL-MCNC: 9.9 MG/DL (ref 8.7–10.5)
CHLORIDE SERPL-SCNC: 103 MMOL/L (ref 95–110)
CLARITY UR: ABNORMAL
CO2 SERPL-SCNC: 26 MMOL/L (ref 23–29)
COLOR UR AUTO: YELLOW
CREAT SERPL-MCNC: 1 MG/DL (ref 0.5–1.4)
ERYTHROCYTE [DISTWIDTH] IN BLOOD BY AUTOMATED COUNT: 14.4 % (ref 11.5–14.5)
ESTRADIOL SERPL HS-MCNC: <10 PG/ML
GFR SERPLBLD CREATININE-BSD FMLA CKD-EPI: >60 ML/MIN/1.73/M2
GLUCOSE SERPL-MCNC: 65 MG/DL (ref 70–110)
GLUCOSE UR QL STRIP: NEGATIVE
HCT VFR BLD AUTO: 39.1 % (ref 37–48.5)
HGB BLD-MCNC: 12.4 GM/DL (ref 12–16)
HGB UR QL STRIP: ABNORMAL
IMM GRANULOCYTES # BLD AUTO: 0.01 K/UL (ref 0–0.04)
IMM GRANULOCYTES NFR BLD AUTO: 0.2 % (ref 0–0.5)
IRON SATN MFR SERPL: 20 % (ref 20–50)
IRON SERPL-MCNC: 94 UG/DL (ref 30–160)
KETONES UR QL STRIP: NEGATIVE
LEUKOCYTE ESTERASE UR QL STRIP: ABNORMAL
LYMPHOCYTES # BLD AUTO: 1.94 K/UL (ref 1–4.8)
MCH RBC QN AUTO: 29.5 PG (ref 27–31)
MCHC RBC AUTO-ENTMCNC: 31.7 G/DL (ref 32–36)
MCV RBC AUTO: 93 FL (ref 82–98)
MICROSCOPIC COMMENT: ABNORMAL
NITRITE UR QL STRIP: NEGATIVE
NUCLEATED RBC (/100WBC) (OHS): 0 /100 WBC
PH UR STRIP: 6 [PH]
PLATELET # BLD AUTO: 221 K/UL (ref 150–450)
PMV BLD AUTO: 11.8 FL (ref 9.2–12.9)
POTASSIUM SERPL-SCNC: 3.7 MMOL/L (ref 3.5–5.1)
PROT UR QL STRIP: ABNORMAL
RBC # BLD AUTO: 4.2 M/UL (ref 4–5.4)
RBC #/AREA URNS AUTO: 12 /HPF (ref 0–4)
RELATIVE EOSINOPHIL (OHS): 1.7 %
RELATIVE LYMPHOCYTE (OHS): 30.6 % (ref 18–48)
RELATIVE MONOCYTE (OHS): 6.3 % (ref 4–15)
RELATIVE NEUTROPHIL (OHS): 60.6 % (ref 38–73)
SODIUM SERPL-SCNC: 143 MMOL/L (ref 136–145)
SP GR UR STRIP: 1.03
SQUAMOUS #/AREA URNS AUTO: 4 /HPF
T4 FREE SERPL-MCNC: 1 NG/DL (ref 0.71–1.51)
TIBC SERPL-MCNC: 462 UG/DL (ref 250–450)
TRANSFERRIN SERPL-MCNC: 312 MG/DL (ref 200–375)
TSH SERPL-ACNC: 32.21 UIU/ML (ref 0.4–4)
UROBILINOGEN UR STRIP-ACNC: NEGATIVE EU/DL
VIT B12 SERPL-MCNC: 895 PG/ML (ref 210–950)
WBC # BLD AUTO: 6.33 K/UL (ref 3.9–12.7)
WBC #/AREA URNS AUTO: 10 /HPF (ref 0–5)

## 2025-06-25 PROCEDURE — 3044F HG A1C LEVEL LT 7.0%: CPT | Mod: CPTII,S$GLB,,

## 2025-06-25 PROCEDURE — 3074F SYST BP LT 130 MM HG: CPT | Mod: CPTII,S$GLB,,

## 2025-06-25 PROCEDURE — 85025 COMPLETE CBC W/AUTO DIFF WBC: CPT

## 2025-06-25 PROCEDURE — 82670 ASSAY OF TOTAL ESTRADIOL: CPT

## 2025-06-25 PROCEDURE — 1159F MED LIST DOCD IN RCRD: CPT | Mod: CPTII,S$GLB,,

## 2025-06-25 PROCEDURE — 84466 ASSAY OF TRANSFERRIN: CPT

## 2025-06-25 PROCEDURE — 3008F BODY MASS INDEX DOCD: CPT | Mod: CPTII,S$GLB,,

## 2025-06-25 PROCEDURE — 84439 ASSAY OF FREE THYROXINE: CPT

## 2025-06-25 PROCEDURE — 84443 ASSAY THYROID STIM HORMONE: CPT

## 2025-06-25 PROCEDURE — 82306 VITAMIN D 25 HYDROXY: CPT

## 2025-06-25 PROCEDURE — 99214 OFFICE O/P EST MOD 30 MIN: CPT | Mod: S$GLB,,,

## 2025-06-25 PROCEDURE — 82607 VITAMIN B-12: CPT

## 2025-06-25 PROCEDURE — 80048 BASIC METABOLIC PNL TOTAL CA: CPT

## 2025-06-25 PROCEDURE — 81001 URINALYSIS AUTO W/SCOPE: CPT

## 2025-06-25 PROCEDURE — 1160F RVW MEDS BY RX/DR IN RCRD: CPT | Mod: CPTII,S$GLB,,

## 2025-06-25 PROCEDURE — 36415 COLL VENOUS BLD VENIPUNCTURE: CPT | Mod: PO

## 2025-06-25 PROCEDURE — 3079F DIAST BP 80-89 MM HG: CPT | Mod: CPTII,S$GLB,,

## 2025-06-25 PROCEDURE — 99999 PR PBB SHADOW E&M-EST. PATIENT-LVL IV: CPT | Mod: PBBFAC,,,

## 2025-06-25 NOTE — PROGRESS NOTES
Kayleighjose alberto Frazier  2025  [id number]    Tricia Rivera MD  Patient Care Team:  Tricia Rivera MD as PCP - General (Family Medicine)  Sanjay Cox FNP-C as Nurse Practitioner (Family Medicine)     Subjective      Chief Complaint:  Chief Complaint   Patient presents with    thyroid off    feeling sluggish       History of Present Illness:    Ms. Frazier presents today for follow up of thyroid condition and fatigue. She has PMHx of HTN, postoperative hypothyroidism, and prediabetes. She reports significant fatigue after starting a new job. States that her job is fast pace and high demand. When she returns home from work she is very tired and has little energy. She experiences palpitations with associated shortness of breath. She also notes increased hair shedding and increased abdominal weight gain. States that she had been taking levothyroxine 137 mcg daily. However for the past few weeks she has forgotten to consistently do so. States that she is in a rush in the mornings which causes her to forget. She takes vitamin D supplements inconsistently and reports anemia that has likely resolved following partial hysterectomy.       Review of Systems   Constitutional:  Positive for activity change, fatigue and unexpected weight change.   HENT:          Admits to hair loss   Respiratory:  Positive for shortness of breath.    Gastrointestinal:  Positive for abdominal distention. Negative for constipation, diarrhea, nausea and vomiting.   Genitourinary:  Negative for dysuria.   Neurological:  Negative for dizziness, weakness and headaches.        History:  Past Medical History:   Diagnosis Date    Anxiety and depression     Essential hypertension 2018    Heart murmur     Hypothyroidism, unspecified     Obstructive sleep apnea syndrome 2020    Prediabetes     Spondylosis of lumbar spine     Thyroid nodule      Past Surgical History:   Procedure Laterality Date     SECTION       CHOLECYSTECTOMY      CYSTOSCOPY  2013    PARTIAL HYSTERECTOMY  2012    per patient    THYROID SURGERY       Family History   Problem Relation Name Age of Onset    Hypertension Mother      Sarcoidosis Father      No Known Problems Son      No Known Problems Son       Social History[1]     Review of patient's allergies indicates:  No Known Allergies    **The following were reviewed at this visit: active problem list, medication list, allergies, family history, social history, and health maintenance.    Medications:  Medications Ordered Prior to Encounter[2]    **Medications have been reviewed and reconciled with patient at this visit.            Objective      Vitals:    06/25/25 1331   BP: 118/82   Pulse: 71   Temp: 97.8 °F (36.6 °C)      Body mass index is 26.22 kg/m².    Wt Readings from Last 3 Encounters:   06/25/25 73.7 kg (162 lb 7.7 oz)   05/23/25 73.5 kg (162 lb 0.6 oz)   01/03/25 74.8 kg (164 lb 14.5 oz)     Temp Readings from Last 3 Encounters:   06/25/25 97.8 °F (36.6 °C) (Tympanic)   01/03/25 98.9 °F (37.2 °C) (Tympanic)   08/30/24 96.8 °F (36 °C) (Tympanic)     BP Readings from Last 3 Encounters:   06/25/25 118/82   05/23/25 120/80   01/03/25 124/86     Pulse Readings from Last 3 Encounters:   06/25/25 71   05/23/25 64   01/03/25 76         Laboratory Reviewed ({Yes)  Lab Results   Component Value Date    WBC 5.58 06/23/2020    HGB 13.0 06/23/2020    HCT 41.0 06/23/2020     06/23/2020    TRIG 64 01/10/2025    HDL 71 01/10/2025    ALT 13 12/30/2024    AST 22 12/30/2024     12/30/2024    K 3.8 12/30/2024     12/30/2024    CREATININE 0.8 12/30/2024    BUN 18 12/30/2024    CO2 27 12/30/2024    TSH 14.200 (H) 01/04/2025    HGBA1C 5.7 (H) 01/04/2025       Physical Exam  Vitals reviewed.   Constitutional:       Appearance: Normal appearance.   Cardiovascular:      Rate and Rhythm: Normal rate and regular rhythm.      Heart sounds: Normal heart sounds.   Pulmonary:      Effort: Pulmonary effort  is normal.      Breath sounds: Normal breath sounds.   Musculoskeletal:         General: Normal range of motion.   Neurological:      General: No focal deficit present.      Mental Status: She is alert and oriented to person, place, and time.   Psychiatric:         Mood and Affect: Mood normal.         Behavior: Behavior normal.         Thought Content: Thought content normal.         Judgment: Judgment normal.               Assessment      Kayleigh was seen today for thyroid off and feeling sluggish.    Diagnoses and all orders for this visit:    Fatigue, unspecified type  -     TSH; Future  -     CBC Auto Differential; Future  -     Basic Metabolic Panel; Future  -     Iron and TIBC; Future  -     Urinalysis    Other specified hypothyroidism  -     TSH; Future    Vitamin D deficiency  -     Vitamin D; Future    Hair loss  -     TSH; Future  -     CBC Auto Differential; Future  -     Basic Metabolic Panel; Future  -     Iron and TIBC; Future  -     Vitamin B12; Future  -     Estradiol; Future    Prediabetes   - stable with surveillance per PCP and metformin    Essential hypertension   - chronic, stable on spirolactone    Overweight (BMI 25.0-29.9)            Plan    - labs today r/o cause for fatigue  - will resume synthroid, dosage based on TSH, T4  - continue all other present management       Follow up: Follow up if symptoms worsen or fail to improve.    **After visit summary was printed and given to patient upon discharge today.  Patient goals and care plan are included in After Visit Summary.    I have spent a total of 35 minutes on this visit. This includes face to face time and non-face to face time preparing to see the patient (eg, review of tests), obtaining and/or reviewing separately obtained history, documenting clinical information in the electronic or other health record, independently interpreting results and communicating results to the patient/family/caregiver, or care coordinator.          Sanjay  Brooke, MSN, APRN, FNP-C         [1]   Social History  Socioeconomic History    Marital status:    Tobacco Use    Smoking status: Never    Smokeless tobacco: Never   Substance and Sexual Activity    Alcohol use: Yes     Comment: occassionally    Drug use: No     Social Drivers of Health     Food Insecurity: No Food Insecurity (6/27/2024)    Received from [facility]    Hunger Vital Sign     Worried About Running Out of Food in the Last Year: Never true     Ran Out of Food in the Last Year: Never true   [2]   Current Outpatient Medications on File Prior to Visit   Medication Sig Dispense Refill    biotin 10,000 mcg Cap Take by mouth.      cholecalciferol, vitamin D3, (VITAMIN D3) 250 mcg (10,000 unit) Cap Take by mouth once a week.      cyanocobalamin (VITAMIN B-12) 1000 MCG tablet Take 100 mcg by mouth once daily.      levothyroxine (SYNTHROID) 137 MCG Tab tablet TAKE 1 TABLET BY MOUTH EVERY MORNING 90 tablet 2    linaCLOtide (LINZESS) 72 mcg Cap capsule Take 72 mcg by mouth before breakfast.      metFORMIN (GLUCOPHAGE-XR) 500 MG ER 24hr tablet TAKE 1 TABLET(500 MG) BY MOUTH DAILY WITH BREAKFAST 90 tablet 1    multivitamin capsule Take 1 capsule by mouth once daily.      spironolactone (ALDACTONE) 50 MG tablet Take 1 tablet (50 mg total) by mouth daily as needed (edema, swelling, fluid build up). 90 tablet 1    valACYclovir (VALTREX) 500 MG tablet Take 500 mg by mouth 2 (two) times daily.       No current facility-administered medications on file prior to visit.

## 2025-06-26 ENCOUNTER — RESULTS FOLLOW-UP (OUTPATIENT)
Dept: FAMILY MEDICINE | Facility: CLINIC | Age: 60
End: 2025-06-26
Payer: COMMERCIAL

## 2025-06-26 DIAGNOSIS — R31.9 URINARY TRACT INFECTION WITH HEMATURIA, SITE UNSPECIFIED: Primary | ICD-10-CM

## 2025-06-26 DIAGNOSIS — N39.0 URINARY TRACT INFECTION WITH HEMATURIA, SITE UNSPECIFIED: Primary | ICD-10-CM

## 2025-06-26 DIAGNOSIS — E89.0 POSTOPERATIVE HYPOTHYROIDISM: ICD-10-CM

## 2025-06-26 RX ORDER — NITROFURANTOIN 25; 75 MG/1; MG/1
100 CAPSULE ORAL 2 TIMES DAILY
Qty: 14 CAPSULE | Refills: 0 | Status: SHIPPED | OUTPATIENT
Start: 2025-06-26 | End: 2025-07-03

## 2025-06-26 RX ORDER — LEVOTHYROXINE SODIUM 137 UG/1
137 TABLET ORAL EVERY MORNING
Qty: 90 TABLET | Refills: 2 | Status: SHIPPED | OUTPATIENT
Start: 2025-06-26

## 2025-06-30 ENCOUNTER — TELEPHONE (OUTPATIENT)
Dept: ENDOCRINOLOGY | Facility: CLINIC | Age: 60
End: 2025-06-30
Payer: COMMERCIAL

## 2025-07-28 ENCOUNTER — OFFICE VISIT (OUTPATIENT)
Dept: FAMILY MEDICINE | Facility: CLINIC | Age: 60
End: 2025-07-28
Attending: FAMILY MEDICINE
Payer: COMMERCIAL

## 2025-07-28 ENCOUNTER — OFFICE VISIT (OUTPATIENT)
Dept: PODIATRY | Facility: CLINIC | Age: 60
End: 2025-07-28
Attending: FAMILY MEDICINE
Payer: COMMERCIAL

## 2025-07-28 ENCOUNTER — HOSPITAL ENCOUNTER (OUTPATIENT)
Dept: RADIOLOGY | Facility: HOSPITAL | Age: 60
Discharge: HOME | End: 2025-07-28
Attending: PODIATRIST
Payer: COMMERCIAL

## 2025-07-28 VITALS
RESPIRATION RATE: 18 BRPM | DIASTOLIC BLOOD PRESSURE: 72 MMHG | WEIGHT: 161.81 LBS | TEMPERATURE: 99 F | HEIGHT: 66 IN | BODY MASS INDEX: 26.01 KG/M2 | SYSTOLIC BLOOD PRESSURE: 124 MMHG | OXYGEN SATURATION: 98 % | HEART RATE: 62 BPM

## 2025-07-28 VITALS — WEIGHT: 161.81 LBS | HEIGHT: 66 IN | BODY MASS INDEX: 26.01 KG/M2

## 2025-07-28 DIAGNOSIS — E03.8 OTHER SPECIFIED HYPOTHYROIDISM: ICD-10-CM

## 2025-07-28 DIAGNOSIS — M67.471 GANGLION CYST OF RIGHT FOOT: ICD-10-CM

## 2025-07-28 DIAGNOSIS — M79.5 FOREIGN BODY (FB) IN SOFT TISSUE: Primary | ICD-10-CM

## 2025-07-28 DIAGNOSIS — M79.671 RIGHT FOOT PAIN: ICD-10-CM

## 2025-07-28 DIAGNOSIS — Z78.0 POSTMENOPAUSAL: ICD-10-CM

## 2025-07-28 DIAGNOSIS — Z00.00 ANNUAL PHYSICAL EXAM: Primary | ICD-10-CM

## 2025-07-28 DIAGNOSIS — M79.5 FOREIGN BODY (FB) IN SOFT TISSUE: ICD-10-CM

## 2025-07-28 DIAGNOSIS — R73.03 PREDIABETES: ICD-10-CM

## 2025-07-28 DIAGNOSIS — Z23 NEED FOR TD VACCINE: ICD-10-CM

## 2025-07-28 DIAGNOSIS — E66.3 OVERWEIGHT (BMI 25.0-29.9): ICD-10-CM

## 2025-07-28 DIAGNOSIS — E55.9 VITAMIN D DEFICIENCY: ICD-10-CM

## 2025-07-28 DIAGNOSIS — G47.33 OSA ON CPAP: ICD-10-CM

## 2025-07-28 DIAGNOSIS — I10 ESSENTIAL HYPERTENSION: ICD-10-CM

## 2025-07-28 DIAGNOSIS — F32.A ANXIETY AND DEPRESSION: ICD-10-CM

## 2025-07-28 DIAGNOSIS — Z23 NEED FOR SHINGLES VACCINE: ICD-10-CM

## 2025-07-28 DIAGNOSIS — F41.9 ANXIETY AND DEPRESSION: ICD-10-CM

## 2025-07-28 PROCEDURE — 90750 HZV VACC RECOMBINANT IM: CPT | Mod: S$GLB,,, | Performed by: FAMILY MEDICINE

## 2025-07-28 PROCEDURE — 3074F SYST BP LT 130 MM HG: CPT | Mod: CPTII,S$GLB,, | Performed by: FAMILY MEDICINE

## 2025-07-28 PROCEDURE — 90714 TD VACC NO PRESV 7 YRS+ IM: CPT | Mod: S$GLB,,, | Performed by: FAMILY MEDICINE

## 2025-07-28 PROCEDURE — 1160F RVW MEDS BY RX/DR IN RCRD: CPT | Mod: CPTII,S$GLB,, | Performed by: PODIATRIST

## 2025-07-28 PROCEDURE — 73630 X-RAY EXAM OF FOOT: CPT | Mod: TC,RT

## 2025-07-28 PROCEDURE — 3044F HG A1C LEVEL LT 7.0%: CPT | Mod: CPTII,S$GLB,, | Performed by: FAMILY MEDICINE

## 2025-07-28 PROCEDURE — 3044F HG A1C LEVEL LT 7.0%: CPT | Mod: CPTII,S$GLB,, | Performed by: PODIATRIST

## 2025-07-28 PROCEDURE — 99396 PREV VISIT EST AGE 40-64: CPT | Mod: 25,S$GLB,, | Performed by: FAMILY MEDICINE

## 2025-07-28 PROCEDURE — 99999 PR PBB SHADOW E&M-EST. PATIENT-LVL V: CPT | Mod: PBBFAC,,, | Performed by: FAMILY MEDICINE

## 2025-07-28 PROCEDURE — 1159F MED LIST DOCD IN RCRD: CPT | Mod: CPTII,S$GLB,, | Performed by: PODIATRIST

## 2025-07-28 PROCEDURE — 99214 OFFICE O/P EST MOD 30 MIN: CPT | Mod: 25,S$GLB,, | Performed by: PODIATRIST

## 2025-07-28 PROCEDURE — 3008F BODY MASS INDEX DOCD: CPT | Mod: CPTII,S$GLB,, | Performed by: FAMILY MEDICINE

## 2025-07-28 PROCEDURE — 90472 IMMUNIZATION ADMIN EACH ADD: CPT | Mod: S$GLB,,, | Performed by: FAMILY MEDICINE

## 2025-07-28 PROCEDURE — 99999 PR PBB SHADOW E&M-EST. PATIENT-LVL IV: CPT | Mod: PBBFAC,,, | Performed by: PODIATRIST

## 2025-07-28 PROCEDURE — 90471 IMMUNIZATION ADMIN: CPT | Mod: S$GLB,,, | Performed by: FAMILY MEDICINE

## 2025-07-28 PROCEDURE — 73630 X-RAY EXAM OF FOOT: CPT | Mod: 26,RT,, | Performed by: RADIOLOGY

## 2025-07-28 PROCEDURE — 3008F BODY MASS INDEX DOCD: CPT | Mod: CPTII,S$GLB,, | Performed by: PODIATRIST

## 2025-07-28 PROCEDURE — 3078F DIAST BP <80 MM HG: CPT | Mod: CPTII,S$GLB,, | Performed by: FAMILY MEDICINE

## 2025-07-28 RX ORDER — FLUTICASONE PROPIONATE 50 MCG
1 SPRAY, SUSPENSION (ML) NASAL
COMMUNITY
Start: 2025-03-20

## 2025-07-28 RX ORDER — BUSPIRONE HYDROCHLORIDE 5 MG/1
TABLET ORAL
Qty: 90 TABLET | Refills: 3 | Status: SHIPPED | OUTPATIENT
Start: 2025-07-28

## 2025-07-28 NOTE — PROGRESS NOTES
Subjective:     Patient ID: Kayleigh Frazier is a 60 y.o. female.    Chief Complaint: Foot Pain (Pre-diabetic c/o right foot pain, pt states 2 wks ago she dropped a jar glass that had her q-tips in and glass was stuck in her foot and she's concerned if any glass is left in her foot, pt rates 5/10 pain, pt wears tennis shoes, PCP Tricia Rivera last seen 7/28/2025)    Kayleigh is a 60 y.o. female who presents to the podiatry clinic  with complaint of  right foot pain. Onset of the symptoms was several weeks ago. Precipitating event: glass jar. Current symptoms include: ability to bear weight, but with some pain. Aggravating factors: walking. Symptoms have gradually improved. Patient has had no prior foot problems. Evaluation to date: none. Treatment to date: pulled apiece of glass out.. Patients rates pain 5/10 on pain scale. Patient also complains of cyst of right foot (points to right 1st MPJ). Patient states the area comes and goes. Patient has no other pedal complaints at this time.     Problem List[1]    Medication List with Changes/Refills   Current Medications    BIOTIN 10,000 MCG CAP    Take by mouth.    BUSPIRONE (BUSPAR) 5 MG TAB    Take 1/2 tab orally twice daily as directed    CHOLECALCIFEROL, VITAMIN D3, (VITAMIN D3) 250 MCG (10,000 UNIT) CAP    Take by mouth once a week.    CYANOCOBALAMIN (VITAMIN B-12) 1000 MCG TABLET    Take 100 mcg by mouth once daily.    FLUTICASONE PROPIONATE (FLONASE) 50 MCG/ACTUATION NASAL SPRAY    1 spray by Each Nostril route as needed for Rhinitis or Allergies.    KRILL OIL ORAL    Take by mouth.    LEVOTHYROXINE (SYNTHROID) 137 MCG TAB TABLET    Take 1 tablet (137 mcg total) by mouth every morning.    LINACLOTIDE (LINZESS) 72 MCG CAP CAPSULE    Take 72 mcg by mouth before breakfast.    METFORMIN (GLUCOPHAGE-XR) 500 MG ER 24HR TABLET    TAKE 1 TABLET(500 MG) BY MOUTH DAILY WITH BREAKFAST    MULTIVITAMIN CAPSULE    Take 1 capsule by mouth once daily.    SPIRONOLACTONE  "(ALDACTONE) 50 MG TABLET    Take 1 tablet (50 mg total) by mouth daily as needed (edema, swelling, fluid build up).    VALACYCLOVIR (VALTREX) 500 MG TABLET    Take 500 mg by mouth 2 (two) times daily.       Review of patient's allergies indicates:  No Known Allergies    Past Surgical History:   Procedure Laterality Date     SECTION      CHOLECYSTECTOMY      CYSTOSCOPY  2013    PARTIAL HYSTERECTOMY  2012    per patient    THYROID SURGERY         Family History   Problem Relation Name Age of Onset    Hypertension Mother      Sarcoidosis Father      No Known Problems Son      No Known Problems Son         Social History[2]    Vitals:    25 1429   Weight: 73.4 kg (161 lb 13.1 oz)   Height: 5' 6" (1.676 m)   PainSc:   5       Hemoglobin A1C   Date Value Ref Range Status   2025 5.7 (H) 4.0 - 5.6 % Final     Comment:     ADA Screening Guidelines:  5.7-6.4%  Consistent with prediabetes  >or=6.5%  Consistent with diabetes    High levels of fetal hemoglobin interfere with the HbA1C  assay. Heterozygous hemoglobin variants (HbS, HgC, etc)do  not significantly interfere with this assay.   However, presence of multiple variants may affect accuracy.     2024 6.0 (H) 4.2 - 5.8 % Final   06/15/2023 5.8 4.2 - 5.8 % Final   2022 5.9 (H) 4.2 - 5.8 % Final       Review of Systems   Constitutional:  Negative for chills and fever.   Respiratory:  Negative for shortness of breath.    Cardiovascular:  Negative for chest pain, palpitations, orthopnea, claudication and leg swelling.   Gastrointestinal:  Negative for diarrhea, nausea and vomiting.   Musculoskeletal:  Negative for joint pain.   Skin:  Negative for rash.   Neurological:  Negative for dizziness, tingling, sensory change, focal weakness and weakness.   Psychiatric/Behavioral: Negative.               Objective:      PHYSICAL EXAM: Apperance: Alert and orient in no distress,well developed, and with good attention to grooming and body habits  Patient " presents ambulating in tennis shoes.   Lower Extremity Exam  VASCULAR: Dorsalis pedis pulses 2/4 right and Posterior Tibial pulses 2/4 right. (--) lymphangitis or (--) cellulitis noted right.  DERMATOLOGICAL: (--) edema, (--) erythema, (--) malodor, (--) drainage, (--) warmth to right foot. Puncture wound noted to right plantar 5th submetatarsal. Post debridement no foreign body noted. Palpable fixated subcutaneous nodule noted to right dorsal medial 1st MPJ. MUSCULOSKELETAL: Muscle strength is 5/5 for foot inverters, everters, plantarflexors, and dorsiflexors. Muscle tone is normal. (+) pain on palpation of right plantar 5th submetatarsal. No pain on palpation of right dorsal medial 1st MPJ.         Assessment:       ICD-10-CM ICD-9-CM   1. Foreign body (FB) in soft tissue - Right Foot  M79.5 729.6   2. Right foot pain - Right Foot  M79.671 729.5   3. Ganglion cyst of right foot - Right Foot  M67.471 727.43       Plan:   Foreign body (FB) in soft tissue - Right Foot  -     Ambulatory referral/consult to Podiatry    Right foot pain - Right Foot  -     Ambulatory referral/consult to Podiatry    Ganglion cyst of right foot - Right Foot  -     X-Ray Foot Complete Right; Future; Expected date: 07/28/2025    I counseled the patient on her conditions, regarding findings of my examination, my impressions, and usual treatment plan.   With patient permission, right foot puncture wound debridement with not foreign body noted. Applied offloading pad to area.   The patient was given oral and written instructions for post-op care including BID soaks of water and Epson salt followed by application of antibiotic ointment  and light dressing.  The patient was instructed to take Tylenol over-the-counter q4h prn pain and to call clinic immediately if there is increased pain, increased redness, pus, fever, chills, nausea, or vomiting present.  Reviewed ultrasound results in exam room with patient.   Discussed surgical and conservative  management of cyst deformity. Conservatively we did discuss padding, needle aspirations/injections, and shoe modifications such as softer shoes with wide toe boxes. Surgically we briefly discussed pre and post operative expectations. The patient elects for conservative management at this time.  Ordered right foot x-rays to be completed today.   Patient to return in 3-4 weeks or sooner if needed.          Zully Durán DPM  [facility]          [1]   Patient Active Problem List  Diagnosis    Essential hypertension    Other specified hypothyroidism    PVC's (premature ventricular contractions)    JORGE on CPAP    Irritable bowel syndrome    Major depression, single episode    Postoperative hypothyroidism    Prediabetes    Overweight (BMI 25.0-29.9)    Postmenopausal   [2]   Social History  Socioeconomic History    Marital status:    Tobacco Use    Smoking status: Never    Smokeless tobacco: Never   Vaping Use    Vaping status: Never Used   Substance and Sexual Activity    Alcohol use: Yes     Comment: occassionally    Drug use: No    Sexual activity: Not Currently   Social History Narrative    She wears seatbelt.     Social Drivers of Health     Food Insecurity: No Food Insecurity (6/27/2024)    Received from [facility]    Hunger Vital Sign     Worried About Running Out of Food in the Last Year: Never true     Ran Out of Food in the Last Year: Never true   Physical Activity: Sufficiently Active (7/28/2025)    Exercise Vital Sign     Days of Exercise per Week: 7 days     Minutes of Exercise per Session: 40 min

## 2025-08-20 ENCOUNTER — TELEPHONE (OUTPATIENT)
Dept: PHARMACY | Facility: CLINIC | Age: 60
End: 2025-08-20
Payer: COMMERCIAL